# Patient Record
Sex: MALE | Race: WHITE | NOT HISPANIC OR LATINO | Employment: OTHER | URBAN - METROPOLITAN AREA
[De-identification: names, ages, dates, MRNs, and addresses within clinical notes are randomized per-mention and may not be internally consistent; named-entity substitution may affect disease eponyms.]

---

## 2017-02-20 ENCOUNTER — TELEPHONE (OUTPATIENT)
Dept: FAMILY MEDICINE | Facility: CLINIC | Age: 72
End: 2017-02-20

## 2017-02-20 DIAGNOSIS — E78.00 PURE HYPERCHOLESTEROLEMIA: ICD-10-CM

## 2017-02-20 RX ORDER — ROSUVASTATIN CALCIUM 20 MG/1
20 TABLET, FILM COATED ORAL DAILY
Qty: 90 TABLET | Refills: 3 | Status: SHIPPED | OUTPATIENT
Start: 2017-02-20 | End: 2017-02-23 | Stop reason: SDUPTHER

## 2017-02-20 NOTE — TELEPHONE ENCOUNTER
----- Message from Yulia Lagunas sent at 2/20/2017 10:40 AM CST -----  REFILLS:   Patient is requesting a medication refill.   RX name:rosuvastatin  Strength: 20 mg.  Directions:   [facility]  Pharmacy phone #:

## 2017-02-22 ENCOUNTER — TELEPHONE (OUTPATIENT)
Dept: FAMILY MEDICINE | Facility: CLINIC | Age: 72
End: 2017-02-22

## 2017-02-22 DIAGNOSIS — E78.00 PURE HYPERCHOLESTEROLEMIA: ICD-10-CM

## 2017-02-23 RX ORDER — ROSUVASTATIN CALCIUM 20 MG/1
20 TABLET, COATED ORAL DAILY
Qty: 90 TABLET | Refills: 3 | Status: SHIPPED | OUTPATIENT
Start: 2017-02-23 | End: 2018-03-14 | Stop reason: SDUPTHER

## 2017-03-08 ENCOUNTER — OFFICE VISIT (OUTPATIENT)
Dept: FAMILY MEDICINE | Facility: CLINIC | Age: 72
End: 2017-03-08
Payer: MEDICARE

## 2017-03-08 VITALS
WEIGHT: 222.88 LBS | RESPIRATION RATE: 16 BRPM | BODY MASS INDEX: 29.54 KG/M2 | OXYGEN SATURATION: 96 % | DIASTOLIC BLOOD PRESSURE: 90 MMHG | HEART RATE: 72 BPM | SYSTOLIC BLOOD PRESSURE: 152 MMHG | HEIGHT: 73 IN

## 2017-03-08 DIAGNOSIS — E78.00 PURE HYPERCHOLESTEROLEMIA: ICD-10-CM

## 2017-03-08 DIAGNOSIS — Z23 IMMUNIZATION DUE: ICD-10-CM

## 2017-03-08 DIAGNOSIS — M19.90 ARTHRITIS: ICD-10-CM

## 2017-03-08 DIAGNOSIS — N52.9 ERECTILE DYSFUNCTION, UNSPECIFIED ERECTILE DYSFUNCTION TYPE: ICD-10-CM

## 2017-03-08 DIAGNOSIS — Z12.11 COLON CANCER SCREENING: ICD-10-CM

## 2017-03-08 DIAGNOSIS — Z00.00 ENCOUNTER FOR PREVENTIVE HEALTH EXAMINATION: Primary | ICD-10-CM

## 2017-03-08 DIAGNOSIS — H91.92 HEARING LOSS OF LEFT EAR, UNSPECIFIED HEARING LOSS TYPE: ICD-10-CM

## 2017-03-08 PROCEDURE — 99499 UNLISTED E&M SERVICE: CPT | Mod: S$GLB,,, | Performed by: NURSE PRACTITIONER

## 2017-03-08 PROCEDURE — G0439 PPPS, SUBSEQ VISIT: HCPCS | Mod: S$GLB,,, | Performed by: NURSE PRACTITIONER

## 2017-03-08 PROCEDURE — 99999 PR PBB SHADOW E&M-EST. PATIENT-LVL IV: CPT | Mod: PBBFAC,,, | Performed by: NURSE PRACTITIONER

## 2017-03-08 RX ORDER — ROSUVASTATIN CALCIUM 20 MG/1
TABLET, FILM COATED ORAL
Refills: 3 | COMMUNITY
Start: 2017-02-23 | End: 2017-03-08 | Stop reason: SDUPTHER

## 2017-03-08 RX ORDER — UBIDECARENONE 30 MG
30 CAPSULE ORAL DAILY
COMMUNITY
Start: 2017-02-21 | End: 2022-11-15

## 2017-03-08 RX ORDER — ROSUVASTATIN CALCIUM 20 MG/1
TABLET, FILM COATED ORAL
COMMUNITY
Start: 2017-02-23 | End: 2017-03-08 | Stop reason: SDUPTHER

## 2017-03-08 NOTE — PATIENT INSTRUCTIONS
Counseling and Referral of Other Preventative  (Italic type indicates deductible and co-insurance are waived)    Patient Name: Joi Castellanos  Today's Date: 3/8/2017      SERVICE LIMITATIONS RECOMMENDATION    Vaccines    · Pneumococcal (once after 65)    · Influenza (annually)    · Hepatitis B (if medium/high risk)    · Prevnar 13      Hepatitis B medium/high risk factors:       - End-stage renal disease       - Hemophiliacs who received Factor VII or         IX concentrates       - Clients of institutions for the mentally             retarded       - Persons who live in the same house as          a HepB carrier       - Homosexual men       - Illicit injectable drug abusers     Pneumococcal: N/A Due 9/20/2017     Influenza: Recommended to patient, declined     Hepatitis B: N/A Not indicated     Prevnar 13: Done, repeat at next scheduled date    Prostate cancer screening (annually to age 75)     Prostate specific antigen (PSA) Shared decision making with Provider. Sometimes a co-pay may be required if the patient decides to have this test. The USPSTF no longer recommends prostate cancer screening routinely in medicine: every 1 year    Colorectal cancer screening (to age 75)    · Fecal occult blood test (annual)  · Flexible sigmoidoscopy (5y)  · Screening colonoscopy (10y)  · Barium enema   Recommended to patient, declined    Diabetes self-management training (no USPSTF recommendations)  Requires referral by treating physician for patient with diabetes or renal disease. 10 hours of initial DSMT sessions of no less than 30 minutes each in a continuous 12-month period. 2 hours of follow-up DSMT in subsequent years.  N/A Not indicated    Glaucoma screening (no USPSTF recommendation)  Diabetes mellitus, family history   , age 50 or over    American, age 65 or over  N/A Not indicated    Medical nutrition therapy for diabetes or renal disease (no recommended schedule)  Requires referral by treating  physician for patient with diabetes or renal disease or kidney transplant within the past 3 years.  Can be provided in same year as diabetes self-management training (DSMT), and CMS recommends medical nutrition therapy take place after DSMT. Up to 3 hours for initial year and 2 hours in subsequent years.  N/A Not indicated    Cardiovascular screening blood tests (every 5 years)  · Fasting lipid panel  Order as a panel if possible  Last done 9/23/2016, recommend to repeat every 1  years    Diabetes screening tests (at least every 3 years, Medicare covers annually or at 6-month intervals for prediabetic patients)  · Fasting blood sugar (FBS) or glucose tolerance test (GTT)  Patient must be diagnosed with one of the following:       - Hypertension       - Dyslipidemia       - Obesity (BMI 30kg/m2)       - Previous elevated impaired FBS or GTT       ... or any two of the following:       - Overweight (BMI 25 but <30)       - Family history of diabetes       - Age 65 or older       - History of gestational diabetes or birth of baby weighing more than 9 pounds  N/A Not indicated    Abdominal aortic aneurysm screening (once)  · Sonogram   Limited to patients who meet one of the following criteria:       - Men who are 65-75 years old and have smoked more than 100 cigarette in their lifetime       - Anyone with a family history of abdominal aortic aneurysm       - Anyone recommended for screening by the USPSTF  N/A Not indicated    HIV screening (annually for increased risk patients)  · HIV-1 and HIV-2 by EIA, or MERLE, rapid antibody test or oral mucosa transudate  Patients must be at increased risk for HIV infection per USPSTF guidelines or pregnant. Tests covered annually for patient at increased risk or as requested by the patient. Pregnant patients may receive up to 3 tests during pregnancy.  Risks discussed, screening is not recommended    Smoking cessation counseling (up to 8 sessions per year)  Patients must be  asymptomatic of tobacco-related conditions to receive as a preventative service.  Not a candidate    Subsequent annual wellness visit  At least 12 months since last AWV  Return in one year     The following information is provided to all patients.  This information is to help you find resources for any of the problems found today that may be affecting your health:                Living healthy guide: www.[facility].[place].[facility]      Understanding Diabetes: www.diabetes.org      Eating healthy: www.cdc.gov/healthyweight      CDC home safety checklist: www.cdc.gov/steadi/patient.html      Agency on Aging: www.goea.[place].[facility]      Alcoholics anonymous (AA): www.aa.org      Physical Activity: www.mauro.nih.gov/jn5koai      Tobacco use: www.quitwithusla.org

## 2017-03-08 NOTE — PROGRESS NOTES
"Joi Castellanos presented for a  Medicare AWV and comprehensive Health Risk Assessment today. The following components were reviewed and updated:    · Medical history  · Family History  · Social history  · Allergies and Current Medications  · Health Risk Assessment  · Health Maintenance  · Care Team     ** See Completed Assessments for Annual Wellness Visit within the encounter summary.**       The following assessments were completed:  · Living Situation  · CAGE  · Depression Screening  · Timed Get Up and Go  · Whisper Test  · Cognitive Function Screening  · Nutrition Screening  · ADL Screening  · PAQ Screening    Vitals:    03/08/17 0954 03/08/17 1048   BP: (!) 142/84 (!) 152/90   BP Location: Right arm Right arm   Patient Position: Sitting Sitting   BP Method: Manual Manual   Pulse: 72    Resp: 16    SpO2: 96%    Weight: 101.1 kg (222 lb 14.2 oz)    Height: 6' 1" (1.854 m)      Body mass index is 29.41 kg/(m^2).  Physical Exam   Constitutional: He is oriented to person, place, and time. He appears well-developed and well-nourished. No distress.   HENT:   Head: Normocephalic and atraumatic.   Eyes: EOM are normal. Pupils are equal, round, and reactive to light.   Neck: Normal range of motion.   Cardiovascular: Normal rate, regular rhythm, normal heart sounds and intact distal pulses.    Pulmonary/Chest: Effort normal and breath sounds normal. No respiratory distress.   Musculoskeletal: Normal range of motion. He exhibits no edema.   Neurological: He is alert and oriented to person, place, and time. Coordination normal.   Skin: Skin is warm and dry.   Psychiatric: He has a normal mood and affect. His behavior is normal. Judgment and thought content normal.   Vitals reviewed.        Diagnoses and health risks identified today and associated recommendations/orders:    1. Encounter for preventive health examination  Whisper test result: abnormal (bilateral ears)  - patient unable to hear whispered word as I stood 3 " feet behind patient and1 feet behind. He was able to identify whispered words as I stood directly next to him.    2. Pure hypercholesterolemia  Chronic; stable. Continue current treatment plan as previously prescribed by PCP.     3. Erectile dysfunction, unspecified erectile dysfunction type  Chronic; stable. Patient followed by PCP.    4. Arthritis  Chronic; stable. Patient followed by PCP.    5. Hearing loss of left ear, unspecified hearing loss type  Chronic; stable. Patient followed by PCP.     6. Colon cancer screening  Patient declined at this time. He reports that he will get his colonoscopy in the near future. Patient wants to consult with is wife first regarding a gastroenterologist.     7. Immunization due  - Influenza vaccine   Patient declined immunization at this time. Patient aware of benefits of being immunized and risks of not getting immunization.         Provided Joi with a 5-10 year written screening schedule and personal prevention plan. Recommendations were developed using the USPSTF age appropriate recommendations. Education, counseling, and referrals were provided as needed. After Visit Summary printed and given to patient which includes a list of additional screenings\tests needed.    Return in about 2 weeks (around 3/22/2017) for Follow-up with PCP, HRA visit in 1 year.    Aga Durahm NP

## 2017-03-08 NOTE — MR AVS SNAPSHOT
[facility]  [address] [place] [address]  Phone: [phone]  Fax: [phone]                  Joi Castellanos   3/8/2017 10:00 AM   Office Visit    Description:  Male : 1945   Provider:  Aga Durham NP   Department:  [facility]           Reason for Visit     Health Risk Assessment           Diagnoses this Visit        Comments    Encounter for preventive health examination    -  Primary     Pure hypercholesterolemia         Erectile dysfunction, unspecified erectile dysfunction type         Arthritis         Hearing loss of left ear, unspecified hearing loss type         Colon cancer screening         Immunization due                To Do List           Goals (5 Years of Data)     None      Follow-Up and Disposition     Return in about 2 weeks (around 3/22/2017) for Follow-up with PCP, HRA visit in 1 year.      [facility]     [facility] Care [facility] -  Assistance  Registered nurses in the [facility] provide clinical advisement, health education, appointment booking, and other advisory services.  Call for this free service at [phone].             Medications           Message regarding Medications     Verify the changes and/or additions to your medication regime listed below are the same as discussed with your clinician today.  If any of these changes or additions are incorrect, please notify your healthcare provider.             Verify that the below list of medications is an accurate representation of the medications you are currently taking.  If none reported, the list may be blank. If incorrect, please contact your healthcare provider. Carry this list with you in case of emergency.           Current Medications     co-enzyme Q-10 30 mg capsule     MULTIVIT &MINERALS/FERROUS FUM (MULTI VITAMIN ORAL) Take by mouth once daily.    rosuvastatin (CRESTOR) 20 MG tablet Take 1 tablet (20 mg total) by mouth once  "daily.           Clinical Reference Information           Your Vitals Were     BP Pulse Resp Height Weight SpO2    152/90 (BP Location: Right arm, Patient Position: Sitting, BP Method: Manual) 72 16 6' 1" (1.854 m) 101.1 kg (222 lb 14.2 oz) 96%    BMI                29.41 kg/m2          Blood Pressure          Most Recent Value    BP  (!)  152/90      Allergies as of 3/8/2017     No Known Allergies      Immunizations Administered on Date of Encounter - 3/8/2017     None      Instructions      Counseling and Referral of Other Preventative  (Italic type indicates deductible and co-insurance are waived)    Patient Name: Joi Castellanos  Today's Date: 3/8/2017      SERVICE LIMITATIONS RECOMMENDATION    Vaccines    · Pneumococcal (once after 65)    · Influenza (annually)    · Hepatitis B (if medium/high risk)    · Prevnar 13      Hepatitis B medium/high risk factors:       - End-stage renal disease       - Hemophiliacs who received Factor VII or         IX concentrates       - Clients of institutions for the mentally             retarded       - Persons who live in the same house as          a HepB carrier       - Homosexual men       - Illicit injectable drug abusers     Pneumococcal: N/A Due 9/20/2017     Influenza: Recommended to patient, declined     Hepatitis B: N/A Not indicated     Prevnar 13: Done, repeat at next scheduled date    Prostate cancer screening (annually to age 75)     Prostate specific antigen (PSA) Shared decision making with Provider. Sometimes a co-pay may be required if the patient decides to have this test. The USPSTF no longer recommends prostate cancer screening routinely in medicine: every 1 year    Colorectal cancer screening (to age 75)    · Fecal occult blood test (annual)  · Flexible sigmoidoscopy (5y)  · Screening colonoscopy (10y)  · Barium enema   Recommended to patient, declined    Diabetes self-management training (no USPSTF recommendations)  Requires referral by treating physician " for patient with diabetes or renal disease. 10 hours of initial DSMT sessions of no less than 30 minutes each in a continuous 12-month period. 2 hours of follow-up DSMT in subsequent years.  N/A Not indicated    Glaucoma screening (no USPSTF recommendation)  Diabetes mellitus, family history   , age 50 or over    American, age 65 or over  N/A Not indicated    Medical nutrition therapy for diabetes or renal disease (no recommended schedule)  Requires referral by treating physician for patient with diabetes or renal disease or kidney transplant within the past 3 years.  Can be provided in same year as diabetes self-management training (DSMT), and CMS recommends medical nutrition therapy take place after DSMT. Up to 3 hours for initial year and 2 hours in subsequent years.  N/A Not indicated    Cardiovascular screening blood tests (every 5 years)  · Fasting lipid panel  Order as a panel if possible  Last done 9/23/2016, recommend to repeat every 1  years    Diabetes screening tests (at least every 3 years, Medicare covers annually or at 6-month intervals for prediabetic patients)  · Fasting blood sugar (FBS) or glucose tolerance test (GTT)  Patient must be diagnosed with one of the following:       - Hypertension       - Dyslipidemia       - Obesity (BMI 30kg/m2)       - Previous elevated impaired FBS or GTT       ... or any two of the following:       - Overweight (BMI 25 but <30)       - Family history of diabetes       - Age 65 or older       - History of gestational diabetes or birth of baby weighing more than 9 pounds  N/A Not indicated    Abdominal aortic aneurysm screening (once)  · Sonogram   Limited to patients who meet one of the following criteria:       - Men who are 65-75 years old and have smoked more than 100 cigarette in their lifetime       - Anyone with a family history of abdominal aortic aneurysm       - Anyone recommended for screening by the USPSTF  N/A Not indicated    HIV  screening (annually for increased risk patients)  · HIV-1 and HIV-2 by EIA, or MERLE, rapid antibody test or oral mucosa transudate  Patients must be at increased risk for HIV infection per USPSTF guidelines or pregnant. Tests covered annually for patient at increased risk or as requested by the patient. Pregnant patients may receive up to 3 tests during pregnancy.  Risks discussed, screening is not recommended    Smoking cessation counseling (up to 8 sessions per year)  Patients must be asymptomatic of tobacco-related conditions to receive as a preventative service.  Not a candidate    Subsequent annual wellness visit  At least 12 months since last AWV  Return in one year     The following information is provided to all patients.  This information is to help you find resources for any of the problems found today that may be affecting your health:                Living healthy guide: www.[facility].[place].[facility]      Understanding Diabetes: www.diabetes.org      Eating healthy: www.cdc.gov/healthyweight      [facility] home safety checklist: www.cdc.gov/steadi/patient.html      Agency on Aging: www.goea.[place].[facility]      Alcoholics anonymous (AA): www.aa.org      Physical Activity: www.mauro.nih.gov/lh6ewxv      Tobacco use: www.quitwithusla.org          Language Assistance Services     ATTENTION: Language assistance services are available, free of charge. Please call [phone].      ATENCIÓN: Si habla faby, tiene a walker disposición servicios gratuitos de asistencia lingüística. Llame al [phone].     CHÚ Ý: N?u b?n nói Ti?ng Vi?t, có các d?ch v? h? tr? ngôn ng? mi?n phí dành cho b?n. G?i s? [phone].         [facility] complies with applicable Federal civil rights laws and does not discriminate on the basis of race, color, national origin, age, disability, or sex.

## 2017-03-22 ENCOUNTER — OFFICE VISIT (OUTPATIENT)
Dept: FAMILY MEDICINE | Facility: CLINIC | Age: 72
End: 2017-03-22
Payer: MEDICARE

## 2017-03-22 VITALS
WEIGHT: 223.31 LBS | OXYGEN SATURATION: 97 % | RESPIRATION RATE: 16 BRPM | DIASTOLIC BLOOD PRESSURE: 90 MMHG | HEART RATE: 77 BPM | BODY MASS INDEX: 29.46 KG/M2 | SYSTOLIC BLOOD PRESSURE: 150 MMHG

## 2017-03-22 DIAGNOSIS — F41.8 SITUATIONAL ANXIETY: ICD-10-CM

## 2017-03-22 DIAGNOSIS — R03.0 WHITE COAT SYNDROME WITHOUT DIAGNOSIS OF HYPERTENSION: ICD-10-CM

## 2017-03-22 DIAGNOSIS — R03.0 ELEVATED BP WITHOUT DIAGNOSIS OF HYPERTENSION: Primary | ICD-10-CM

## 2017-03-22 PROCEDURE — 1159F MED LIST DOCD IN RCRD: CPT | Mod: S$GLB,,,

## 2017-03-22 PROCEDURE — 99999 PR PBB SHADOW E&M-EST. PATIENT-LVL III: CPT | Mod: PBBFAC,,,

## 2017-03-22 PROCEDURE — 1160F RVW MEDS BY RX/DR IN RCRD: CPT | Mod: S$GLB,,,

## 2017-03-22 PROCEDURE — 99214 OFFICE O/P EST MOD 30 MIN: CPT | Mod: S$GLB,,,

## 2017-03-22 PROCEDURE — 1126F AMNT PAIN NOTED NONE PRSNT: CPT | Mod: S$GLB,,,

## 2017-03-22 PROCEDURE — 1157F ADVNC CARE PLAN IN RCRD: CPT | Mod: S$GLB,,,

## 2017-03-22 NOTE — MR AVS SNAPSHOT
[facility]  [address] [place] [address]  Phone: [phone]  Fax: [phone]                  Joi Castellanos   3/22/2017 9:40 AM   Office Visit    Description:  Male : 1945   Provider:  Mir Celestin Jr., MD   Department:  [facility]           Reason for Visit     Hypertension           Diagnoses this Visit        Comments    Elevated BP without diagnosis of hypertension    -  Primary     Situational anxiety         White coat syndrome without diagnosis of hypertension                To Do List           Goals (5 Years of Data)     None      Follow-Up and Disposition     Return if symptoms worsen or fail to improve.    Follow-up and Disposition History      [facility]     [facility] On Call Nurse Care Line -  Assistance  Registered nurses in the [facility] Call Center provide clinical advisement, health education, appointment booking, and other advisory services.  Call for this free service at [phone].             Medications           Message regarding Medications     Verify the changes and/or additions to your medication regime listed below are the same as discussed with your clinician today.  If any of these changes or additions are incorrect, please notify your healthcare provider.             Verify that the below list of medications is an accurate representation of the medications you are currently taking.  If none reported, the list may be blank. If incorrect, please contact your healthcare provider. Carry this list with you in case of emergency.           Current Medications     co-enzyme Q-10 30 mg capsule     MULTIVIT &MINERALS/FERROUS FUM (MULTI VITAMIN ORAL) Take by mouth once daily.    rosuvastatin (CRESTOR) 20 MG tablet Take 1 tablet (20 mg total) by mouth once daily.           Clinical Reference Information           Your Vitals Were     BP Pulse Resp Weight SpO2 BMI    150/90 (BP Location: Right arm, Patient Position:  Sitting, BP Method: Manual) 77 16 101.3 kg (223 lb 5.2 oz) 97% 29.46 kg/m2      Blood Pressure          Most Recent Value    BP  (!)  150/90      Allergies as of 3/22/2017     No Known Allergies      Immunizations Administered on Date of Encounter - 3/22/2017     None      Language Assistance Services     ATTENTION: Language assistance services are available, free of charge. Please call [phone].      ATENCIÓN: Si habla español, tiene a walker disposición servicios gratuitos de asistencia lingüística. Llame al [phone].     CHÚ Ý: N?u b?n nói Ti?ng Vi?t, có các d?ch v? h? tr? ngôn ng? mi?n phí dành cho b?n. G?i s? [phone].         [facility] complies with applicable Federal civil rights laws and does not discriminate on the basis of race, color, national origin, age, disability, or sex.

## 2017-03-22 NOTE — PROGRESS NOTES
Subjective:       Patient ID: Joi Castellanos is a 71 y.o. male.    Chief Complaint: Hypertension    HPI The patient presents with concerns about his blood pressure. It was found to be elevated on his HRA visit. His blood pressures are always normal at home. He has anxiety about not being late for his appointments. He monitors his blood pressures at home and his blood pressures are in the 120-70.     Review of Systems   Constitutional: Negative.    Respiratory: Negative.  Negative for shortness of breath.    Cardiovascular: Negative for chest pain.   Psychiatric/Behavioral: Negative.    All other systems reviewed and are negative.      Objective:      Vitals:    03/22/17 0950   BP: (!) 150/90   Pulse: 77   Resp: 16     Physical Exam   Constitutional: He is oriented to person, place, and time. He appears well-developed and well-nourished. He is cooperative. No distress.   HENT:   Head: Normocephalic and atraumatic.   Right Ear: Hearing, tympanic membrane, external ear and ear canal normal.   Left Ear: Hearing, external ear and ear canal normal.   Nose: Nose normal.   Mouth/Throat: Oropharynx is clear and moist.   Eyes: Conjunctivae are normal. Pupils are equal, round, and reactive to light.   Neck: Normal range of motion. Neck supple. No thyromegaly present.   Cardiovascular: Normal rate, regular rhythm, normal heart sounds and intact distal pulses.    Pulmonary/Chest: Effort normal and breath sounds normal. No respiratory distress.   Musculoskeletal: Normal range of motion. He exhibits no edema or tenderness.   Lymphadenopathy:     He has no cervical adenopathy.   Neurological: He is alert and oriented to person, place, and time. He has normal strength. Coordination and gait normal.   Skin: Skin is warm, dry and intact. No cyanosis. Nails show no clubbing.   Psychiatric: He has a normal mood and affect. His speech is normal and behavior is normal. Judgment and thought content normal. Cognition and memory are  normal.   Vitals reviewed.      Assessment:       1. Elevated BP without diagnosis of hypertension    2. Situational anxiety    3. White coat syndrome without diagnosis of hypertension        Plan:       Elevated BP without diagnosis of hypertension    Situational anxiety    White coat syndrome without diagnosis of hypertension      Return if symptoms worsen or fail to improve.      Continued monitoring. Dash Diet. Continue to exercise     Not a candidate for medications

## 2017-08-02 ENCOUNTER — PATIENT MESSAGE (OUTPATIENT)
Dept: FAMILY MEDICINE | Facility: CLINIC | Age: 72
End: 2017-08-02

## 2017-08-02 DIAGNOSIS — M16.12 PRIMARY OSTEOARTHRITIS OF LEFT HIP: Primary | ICD-10-CM

## 2017-08-21 ENCOUNTER — TELEPHONE (OUTPATIENT)
Dept: FAMILY MEDICINE | Facility: CLINIC | Age: 72
End: 2017-08-21

## 2017-08-21 DIAGNOSIS — M16.10 ARTHRITIS, HIP: Primary | ICD-10-CM

## 2018-02-12 ENCOUNTER — PES CALL (OUTPATIENT)
Dept: ADMINISTRATIVE | Facility: CLINIC | Age: 73
End: 2018-02-12

## 2018-03-12 ENCOUNTER — OFFICE VISIT (OUTPATIENT)
Dept: FAMILY MEDICINE | Facility: CLINIC | Age: 73
End: 2018-03-12
Payer: MEDICARE

## 2018-03-12 ENCOUNTER — TELEPHONE (OUTPATIENT)
Dept: FAMILY MEDICINE | Facility: CLINIC | Age: 73
End: 2018-03-12

## 2018-03-12 VITALS
HEIGHT: 74 IN | DIASTOLIC BLOOD PRESSURE: 80 MMHG | BODY MASS INDEX: 28.43 KG/M2 | HEART RATE: 72 BPM | WEIGHT: 221.56 LBS | SYSTOLIC BLOOD PRESSURE: 130 MMHG | OXYGEN SATURATION: 98 %

## 2018-03-12 DIAGNOSIS — Z29.9 PREVENTIVE MEASURE: ICD-10-CM

## 2018-03-12 DIAGNOSIS — R73.9 HYPERGLYCEMIA: Primary | ICD-10-CM

## 2018-03-12 DIAGNOSIS — Z00.00 ENCOUNTER FOR PREVENTIVE HEALTH EXAMINATION: Primary | ICD-10-CM

## 2018-03-12 DIAGNOSIS — N52.01 ERECTILE DYSFUNCTION DUE TO ARTERIAL INSUFFICIENCY: ICD-10-CM

## 2018-03-12 DIAGNOSIS — E78.00 PURE HYPERCHOLESTEROLEMIA: ICD-10-CM

## 2018-03-12 DIAGNOSIS — M19.90 ARTHRITIS: ICD-10-CM

## 2018-03-12 DIAGNOSIS — R03.0 ELEVATED BP WITHOUT DIAGNOSIS OF HYPERTENSION: ICD-10-CM

## 2018-03-12 PROCEDURE — 99499 UNLISTED E&M SERVICE: CPT | Mod: S$GLB,,,

## 2018-03-12 PROCEDURE — 90732 PPSV23 VACC 2 YRS+ SUBQ/IM: CPT | Mod: S$GLB,,,

## 2018-03-12 PROCEDURE — G0009 ADMIN PNEUMOCOCCAL VACCINE: HCPCS | Mod: S$GLB,,,

## 2018-03-12 PROCEDURE — 99397 PER PM REEVAL EST PAT 65+ YR: CPT | Mod: S$GLB,,,

## 2018-03-12 PROCEDURE — 99999 PR PBB SHADOW E&M-EST. PATIENT-LVL III: CPT | Mod: PBBFAC,,,

## 2018-03-12 RX ORDER — SILDENAFIL 100 MG/1
100 TABLET, FILM COATED ORAL DAILY PRN
Qty: 6 TABLET | Refills: 11 | Status: SHIPPED | OUTPATIENT
Start: 2018-03-12 | End: 2019-06-11

## 2018-03-12 RX ORDER — MELOXICAM 15 MG/1
TABLET ORAL
COMMUNITY
Start: 2017-11-24 | End: 2020-09-25

## 2018-03-12 NOTE — PROGRESS NOTES
Subjective:       Patient ID: Joi Castellanos is a 72 y.o. male.    Chief Complaint: Annual Exam    HPI Data obtained from Agency for Healthcare and Research Quality (AHRQ):    GRADE A -The USPSTF recommends the service. There is high certainty that the net benefit is substantial. Offer or provide this service.    GRADE B - The USPSTF recommends the service. There is high certainty that the net benefit is moderate or there is moderate certainty that the net benefit is moderate to substantial. Offer or provide this service.    View All     13 - Recommended (A, B)    Grade Title Risk Info. Details   Due  Colorectal Cancer: Screening --Adults aged 50 to 75 years     140/82 High Blood Pressure: Screening and Home Monitoring -- Adults     Low  Syphilis: Screening --Asymptomatic, nonpregnant adults and adolescents who are at increased risk for syphilis infection     Denies  Alcohol Misuse: Screening and Behavioral Counseling Interventions in Primary Care -- Adults     Denies  Depression: Screening -- General adult population, including pregnant and postpartum women     Low  Fall Prevention --Exercise/Physical Therapy ANDVitamin D Supplementation: Community-dwelling Adults 65 Years or Older, Increased Risk for Falls     Yes  Healthful Diet and Physical Activity for CVD Disease Prevention: Counseling -- Adults with CVD Risk Factors     Low  Hepatitis B: Screening -- Nonpregnant Adolescents and Adults At High Risk     UTD  Hepatitis C Virus Infection: Screening--Adults at High Risk and Adults born between 1945 and 1965     Low  Latent Tuberculosis Infection: Screening -- Asymptomatic adults at increased risk for infection     Over  Obesity: Screening for and Management of-- All Adults       Low  Sexually Transmitted Infections: Behavioral Counseling -- Sexually Active Adolescents and Adults     Compliant Statin Use for the Primary Prevention of CVD: Preventive Medicine -- Adults age 40 to 75 years with no history of CVD, 1  or more CVD risk factors, and a calculated 10-year CVD event risk of 10% or greater.       He monitors his blood pressures and it is in 130/80 average range. He has severe hip arthritis which is being controlled with meloxicam.     Review of Systems   Constitutional: Negative.    Respiratory: Negative.  Negative for shortness of breath.    Cardiovascular: Negative for chest pain.   Psychiatric/Behavioral: Negative.    All other systems reviewed and are negative.      Objective:      Vitals:    03/12/18 0950   BP: 130/80   Pulse: 72     Physical Exam   Constitutional: He is oriented to person, place, and time. He appears well-developed and well-nourished. He is cooperative. No distress.   HENT:   Head: Normocephalic and atraumatic.   Right Ear: Hearing, tympanic membrane, external ear and ear canal normal.   Left Ear: Hearing, external ear and ear canal normal.   Nose: Nose normal.   Mouth/Throat: Oropharynx is clear and moist.   Eyes: Conjunctivae are normal. Pupils are equal, round, and reactive to light.   Neck: Normal range of motion. Neck supple. No thyromegaly present.   Cardiovascular: Normal rate, regular rhythm, normal heart sounds and intact distal pulses.    Pulmonary/Chest: Effort normal and breath sounds normal. No respiratory distress.   Musculoskeletal: Normal range of motion. He exhibits no edema or tenderness.   Lymphadenopathy:     He has no cervical adenopathy.   Neurological: He is alert and oriented to person, place, and time. He has normal strength. Coordination and gait normal.   Skin: Skin is warm, dry and intact. No cyanosis. Nails show no clubbing.   Psychiatric: He has a normal mood and affect. His speech is normal and behavior is normal. Judgment and thought content normal. Cognition and memory are normal.   Vitals reviewed.      Assessment:       1. Encounter for preventive health examination    2. Elevated BP without diagnosis of hypertension    3. Pure hypercholesterolemia    4.  Erectile dysfunction due to arterial insufficiency    5. Arthritis    6. Preventive measure        Plan:       Encounter for preventive health examination    Elevated BP without diagnosis of hypertension    Pure hypercholesterolemia    Erectile dysfunction due to arterial insufficiency    Arthritis    Preventive measure    He is interested in a vacuum pump device.     Follow-up in about 1 year (around 3/12/2019).

## 2018-03-14 ENCOUNTER — TELEPHONE (OUTPATIENT)
Dept: FAMILY MEDICINE | Facility: CLINIC | Age: 73
End: 2018-03-14

## 2018-03-14 DIAGNOSIS — E78.00 PURE HYPERCHOLESTEROLEMIA: ICD-10-CM

## 2018-03-14 RX ORDER — ROSUVASTATIN CALCIUM 20 MG/1
20 TABLET, COATED ORAL DAILY
Qty: 90 TABLET | Refills: 3 | Status: SHIPPED | OUTPATIENT
Start: 2018-03-14 | End: 2019-03-08 | Stop reason: SDUPTHER

## 2018-03-14 NOTE — TELEPHONE ENCOUNTER
----- Message from Guera Kong sent at 3/14/2018 12:55 PM CDT -----  Contact: Self [phone]  Patient is calling to get refills on his medication sent to [facility]- [facility] - [address] [phone] (Phone)  [phone] (Fax)    1. rosuvastatin (CRESTOR) 20 MG tablet 90 tablet

## 2018-09-27 ENCOUNTER — PES CALL (OUTPATIENT)
Dept: ADMINISTRATIVE | Facility: CLINIC | Age: 73
End: 2018-09-27

## 2019-02-25 ENCOUNTER — OFFICE VISIT (OUTPATIENT)
Dept: FAMILY MEDICINE | Facility: CLINIC | Age: 74
End: 2019-02-25
Payer: MEDICARE

## 2019-02-25 VITALS
DIASTOLIC BLOOD PRESSURE: 86 MMHG | HEIGHT: 73 IN | WEIGHT: 215.81 LBS | OXYGEN SATURATION: 99 % | HEART RATE: 72 BPM | TEMPERATURE: 98 F | SYSTOLIC BLOOD PRESSURE: 144 MMHG | BODY MASS INDEX: 28.6 KG/M2

## 2019-02-25 DIAGNOSIS — Z00.00 ENCOUNTER FOR PREVENTIVE HEALTH EXAMINATION: Primary | ICD-10-CM

## 2019-02-25 DIAGNOSIS — R73.01 IFG (IMPAIRED FASTING GLUCOSE): ICD-10-CM

## 2019-02-25 DIAGNOSIS — E78.00 PURE HYPERCHOLESTEROLEMIA: ICD-10-CM

## 2019-02-25 DIAGNOSIS — M16.12 PRIMARY OSTEOARTHRITIS OF LEFT HIP: ICD-10-CM

## 2019-02-25 PROCEDURE — 99999 PR PBB SHADOW E&M-EST. PATIENT-LVL IV: CPT | Mod: PBBFAC,HCNC,, | Performed by: FAMILY MEDICINE

## 2019-02-25 PROCEDURE — 99397 PR PREVENTIVE VISIT,EST,65 & OVER: ICD-10-PCS | Mod: HCNC,S$GLB,, | Performed by: FAMILY MEDICINE

## 2019-02-25 PROCEDURE — 99397 PER PM REEVAL EST PAT 65+ YR: CPT | Mod: HCNC,S$GLB,, | Performed by: FAMILY MEDICINE

## 2019-02-25 PROCEDURE — 99499 UNLISTED E&M SERVICE: CPT | Mod: S$GLB,,, | Performed by: FAMILY MEDICINE

## 2019-02-25 PROCEDURE — 99499 RISK ADDL DX/OHS AUDIT: ICD-10-PCS | Mod: S$GLB,,, | Performed by: FAMILY MEDICINE

## 2019-02-25 PROCEDURE — 99999 PR PBB SHADOW E&M-EST. PATIENT-LVL IV: ICD-10-PCS | Mod: PBBFAC,HCNC,, | Performed by: FAMILY MEDICINE

## 2019-02-25 NOTE — PROGRESS NOTES
(Portions of this note were dictated using voice recognition software and may contain dictation related errors in spelling/grammar/syntax not found on text review)    CC:   Chief Complaint   Patient presents with    Establish Care     tetanus due    Colonoscopy       HPI: 73 y.o. male new to this office to establish care.  Prior patient of Dr. Celestin.    Hyperlipidemia on rosuvastatin 20 mg daily    ED, prescribed Viagra    DJD severe left hip, also knee DJD.  X-rays on file from 2016.  Given meloxicam that he takes roughly 3 times a week.  Plain follow-up with his orthopedist, Dr. Wei to potentially discuss hip replacement given some worsening pains of recent    Abnormal glucose as below, couple of readings in the 130s, unknown fasting status for all of these.  Did have an A1c of 6.0 last year consistent with pre diabetes    BP elevated, review of prior record demonstrated prior BP elevations as well.  No formal hypertension diagnosis and no meds for this.  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  At 1 time had micro hematuria noted on workup and had further testing including imaging and cystoscopy which was all normal.  Denies any current hematuria issues.      Exercises at the gym to try to help with his arthritis pains        Past Medical History:   Diagnosis Date    Arthritis     Hyperlipidemia     IFG (impaired fasting glucose)        Past Surgical History:   Procedure Laterality Date    TONSILLECTOMY         Family History   Problem Relation Age of Onset    Cancer Mother         unknown, met to spine    Hypertension Mother     Osteoarthritis Mother     Deep vein thrombosis Father     Heart attack Father     Pulmonary  embolism Father     Emphysema Father     Arthritis Sister     No Known Problems Daughter     No Known Problems Son     Fibromyalgia Sister     Breast cancer Maternal Aunt     Heart disease Neg Hx     Prostate cancer Neg Hx     Colon cancer Neg Hx        Social History     Socioeconomic History    Marital status:      Spouse name: Not on file    Number of children: Not on file    Years of education: Not on file    Highest education level: Not on file   Social Needs    Financial resource strain: Not on file    Food insecurity - worry: Not on file    Food insecurity - inability: Not on file    Transportation needs - medical: Not on file    Transportation needs - non-medical: Not on file   Occupational History    Not on file   Tobacco Use    Smoking status: Never Smoker    Smokeless tobacco: Never Used   Substance and Sexual Activity    Alcohol use: Yes     Comment: Occasionally    Drug use: No    Sexual activity: Yes     Partners: Female   Other Topics Concern    Not on file   Social History Narrative    Lives in [place] with his wife. He is retired from [employer]. He is originally from [place]. 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  2 daughters with marital and family problems. One of them is  and remarried because her  has a serious anger problems. His other son-in-law is in treatment for drug and alcohol problems. He has 5 grand children. He was in the [employer]. He goes to the gym. His son loves to hunt. He bought a small RV       Lab Results   Component Value Date    WBC 4.18 03/12/2018    HGB 15.3 03/12/2018    HCT 43.0 03/12/2018     03/12/2018    CHOL 166 03/12/2018    TRIG 168 (H) 03/12/2018    HDL 35 (L) 03/12/2018    ALT 38 03/12/2018    AST 28 03/12/2018     03/12/2018    K 4.2 03/12/2018     03/12/2018    CREATININE 0.96 03/12/2018    CALCIUM 9.6 03/12/2018    ALBUMIN 4.6 03/12/2018    BUN 14 03/12/2018    CO2 28 03/12/2018    TSH 1.690 09/23/2016     HGBA1C 6.0 (H) 03/12/2018    LDLCALC 97.4 03/12/2018     (H) 03/12/2018         Glucose   Date Value Ref Range Status   03/12/2018 132 (H) 70 - 110 mg/dL Final   09/23/2016 137 (H) 70 - 110 mg/dL Final   08/19/2015 104 70 - 110 mg/dL Final   11/20/2013 119 (H) 70 - 110 mg/dL Final             Vital signs reviewed  PE:   APPEARANCE: Well nourished, well developed, in no acute distress.    HEAD: Normocephalic, atraumatic.  EYES: PERRL. EOMI.   Conjunctivae noninjected.  EARS: TM's intact. Light reflex normal. No retraction or perforation.    NOSE: Mucosa pink. Airway clear.  MOUTH & THROAT: No tonsillar enlargement. No pharyngeal erythema or exudate.   NECK: Supple with no cervical lymphadenopathy.  No carotid bruits.  No thyromegaly  CHEST: Good inspiratory effort. Lungs clear to auscultation with no wheezes or crackles.  CARDIOVASCULAR: Normal S1, S2. No rubs, murmurs, or gallops.  ABDOMEN: Bowel sounds normal. Not distended. Soft. No tenderness or masses. No organomegaly.  EXTREMITIES: No edema, cyanosis, or clubbing.  Antalgic gait secondary to his left hip pain.      IMPRESSION  1. Encounter for preventive health examination    2. IFG (impaired fasting glucose)    3. Pure hypercholesterolemia    4. Primary osteoarthritis of left hip        PLAN  Orders Placed This Encounter   Procedures    CBC auto differential    Comprehensive metabolic panel    Lipid panel    TSH    Hemoglobin A1c     Elevated blood pressure, prior white coat hypertension consideration.  Advise one-month nurse follow up with home blood pressure monitor compared to office monitor along with Home readings to check for stability of blood pressure readings    Continue regular exercise.  Advise healthy diet    Meloxicam okay for p.r.n. use but he    will be following up with the worse orthopedist given his hip arthritis to discuss potential replacement    Hyperlipidemia:  Continue Crestor 20 mg daily    Pre diabetes:  Healthy diet, check  labs        SCREENINGS (males >=65)    Immunizations:  tdap:  Can get at pharmacy  Zoster:  Can get at pharmacy  PCV 2016  PVX 2018  Declines flu vaccine    Colon cancer screen: FIT kit neg 2019 (c/scope 2006-normal)    Hepatitis-C screening up-to-date 2016

## 2019-02-25 NOTE — PATIENT INSTRUCTIONS
Look into getting the Shingles vaccine (SHINGRIX) at your local pharmacy (2 part series, done once at/after age 50)      Check with your pharmacy regarding getting the tetanus (Tdap) vaccine (once every 10 years)

## 2019-02-26 ENCOUNTER — PATIENT MESSAGE (OUTPATIENT)
Dept: FAMILY MEDICINE | Facility: CLINIC | Age: 74
End: 2019-02-26

## 2019-02-26 DIAGNOSIS — E11.9 TYPE 2 DIABETES MELLITUS WITHOUT COMPLICATION, WITHOUT LONG-TERM CURRENT USE OF INSULIN: Primary | ICD-10-CM

## 2019-02-26 LAB
ALBUMIN SERPL-MCNC: 4.6 G/DL (ref 3.6–5.1)
ALBUMIN/GLOB SERPL: 1.2 (CALC) (ref 1–2.5)
ALP SERPL-CCNC: 54 U/L (ref 40–115)
ALT SERPL-CCNC: 22 U/L (ref 9–46)
AST SERPL-CCNC: 18 U/L (ref 10–35)
BASOPHILS # BLD AUTO: 19 CELLS/UL (ref 0–200)
BASOPHILS NFR BLD AUTO: 0.4 %
BILIRUB SERPL-MCNC: 0.8 MG/DL (ref 0.2–1.2)
BUN SERPL-MCNC: 17 MG/DL (ref 7–25)
BUN/CREAT SERPL: ABNORMAL (CALC) (ref 6–22)
CALCIUM SERPL-MCNC: 9.7 MG/DL (ref 8.6–10.3)
CHLORIDE SERPL-SCNC: 100 MMOL/L (ref 98–110)
CHOLEST SERPL-MCNC: 204 MG/DL
CHOLEST/HDLC SERPL: 5.8 (CALC)
CO2 SERPL-SCNC: 28 MMOL/L (ref 20–32)
CREAT SERPL-MCNC: 1 MG/DL (ref 0.7–1.18)
EOSINOPHIL # BLD AUTO: 113 CELLS/UL (ref 15–500)
EOSINOPHIL NFR BLD AUTO: 2.4 %
ERYTHROCYTE [DISTWIDTH] IN BLOOD BY AUTOMATED COUNT: 12.5 % (ref 11–15)
GFRSERPLBLD MDRD-ARVRAT: 75 ML/MIN/1.73M2
GLOBULIN SER CALC-MCNC: 4 G/DL (CALC) (ref 1.9–3.7)
GLUCOSE SERPL-MCNC: 133 MG/DL (ref 65–99)
HBA1C MFR BLD: 6.6 % OF TOTAL HGB
HCT VFR BLD AUTO: 47.5 % (ref 38.5–50)
HDLC SERPL-MCNC: 35 MG/DL
HGB BLD-MCNC: 16 G/DL (ref 13.2–17.1)
LDLC SERPL CALC-MCNC: 130 MG/DL (CALC)
LYMPHOCYTES # BLD AUTO: 1941 CELLS/UL (ref 850–3900)
LYMPHOCYTES NFR BLD AUTO: 41.3 %
MCH RBC QN AUTO: 30.7 PG (ref 27–33)
MCHC RBC AUTO-ENTMCNC: 33.7 G/DL (ref 32–36)
MCV RBC AUTO: 91 FL (ref 80–100)
MONOCYTES # BLD AUTO: 376 CELLS/UL (ref 200–950)
MONOCYTES NFR BLD AUTO: 8 %
NEUTROPHILS # BLD AUTO: 2251 CELLS/UL (ref 1500–7800)
NEUTROPHILS NFR BLD AUTO: 47.9 %
NONHDLC SERPL-MCNC: 169 MG/DL (CALC)
PLATELET # BLD AUTO: 200 THOUSAND/UL (ref 140–400)
PMV BLD REES-ECKER: 10.1 FL (ref 7.5–12.5)
POTASSIUM SERPL-SCNC: 4.2 MMOL/L (ref 3.5–5.3)
PROT SERPL-MCNC: 8.6 G/DL (ref 6.1–8.1)
RBC # BLD AUTO: 5.22 MILLION/UL (ref 4.2–5.8)
SODIUM SERPL-SCNC: 139 MMOL/L (ref 135–146)
TRIGL SERPL-MCNC: 244 MG/DL
TSH SERPL-ACNC: 1.55 MIU/L (ref 0.4–4.5)
WBC # BLD AUTO: 4.7 THOUSAND/UL (ref 3.8–10.8)

## 2019-02-26 NOTE — TELEPHONE ENCOUNTER
Dear Joi Castellanos    Your recent labs were reviewed and released to your account.  Your labs were reviewed.  The blood sugar test again was elevated and the follow-up test for this does show that you do have diabetes although is not severe at this point.  However, working hard on  regular exercise and healthy eating habits can help normalize the blood sugar.  I do think we need to continue to monitor this periodically, and I would like to recheck some labs in 3 months to see if there is any improvement.  If there is any trend up in your blood sugars, we may need to consider starting some medication for this.    Also your cholesterol test means to be a little bit lower especially given the diabetes diagnosis.  At this point I think you can continue your Crestor 20 mg daily but again in 3 months I would like to check some labs again and consider potentially upgrading your  Crestor up to 40 mg if we need to at that time to better manage the blood cholesterol levels    Please check back in the office in 3 months, and call a few days to a week ahead of time to get these follow-up labs checked as well.    Marc Lakhani MD

## 2019-02-26 NOTE — TELEPHONE ENCOUNTER
My chart message sent.  Needs repeat office visit in 3 months.  I have labs ordered through [facility] that he can just get done a few days before he comes in for his repeat visit.

## 2019-03-06 ENCOUNTER — CLINICAL SUPPORT (OUTPATIENT)
Dept: FAMILY MEDICINE | Facility: CLINIC | Age: 74
End: 2019-03-06
Payer: MEDICARE

## 2019-03-06 VITALS — SYSTOLIC BLOOD PRESSURE: 147 MMHG | DIASTOLIC BLOOD PRESSURE: 84 MMHG

## 2019-03-06 PROCEDURE — 99999 PR PBB SHADOW E&M-EST. PATIENT-LVL II: CPT | Mod: PBBFAC,HCNC,,

## 2019-03-06 PROCEDURE — 99999 PR PBB SHADOW E&M-EST. PATIENT-LVL II: ICD-10-PCS | Mod: PBBFAC,HCNC,,

## 2019-03-06 NOTE — PROGRESS NOTES
Pt came in for blood pressure check 3/6/2019.    Self machine read- 175/95  Office machine read-161/89    Second reading on in office machine- 147/84

## 2019-03-08 DIAGNOSIS — E78.00 PURE HYPERCHOLESTEROLEMIA: ICD-10-CM

## 2019-03-08 RX ORDER — ROSUVASTATIN CALCIUM 20 MG/1
20 TABLET, COATED ORAL DAILY
Qty: 90 TABLET | Refills: 3 | Status: SHIPPED | OUTPATIENT
Start: 2019-03-08 | End: 2020-03-23

## 2019-04-26 ENCOUNTER — PES CALL (OUTPATIENT)
Dept: ADMINISTRATIVE | Facility: CLINIC | Age: 74
End: 2019-04-26

## 2019-06-06 ENCOUNTER — PATIENT MESSAGE (OUTPATIENT)
Dept: FAMILY MEDICINE | Facility: CLINIC | Age: 74
End: 2019-06-06

## 2019-06-06 LAB
ALBUMIN SERPL-MCNC: 4.6 G/DL (ref 3.6–5.1)
ALBUMIN/CREAT UR: 9 MCG/MG CREAT
ALBUMIN/GLOB SERPL: 1.5 (CALC) (ref 1–2.5)
ALP SERPL-CCNC: 60 U/L (ref 40–115)
ALT SERPL-CCNC: 23 U/L (ref 9–46)
AST SERPL-CCNC: 20 U/L (ref 10–35)
BILIRUB SERPL-MCNC: 0.6 MG/DL (ref 0.2–1.2)
BUN SERPL-MCNC: 14 MG/DL (ref 7–25)
BUN/CREAT SERPL: ABNORMAL (CALC) (ref 6–22)
CALCIUM SERPL-MCNC: 9.6 MG/DL (ref 8.6–10.3)
CHLORIDE SERPL-SCNC: 102 MMOL/L (ref 98–110)
CHOLEST SERPL-MCNC: 147 MG/DL
CHOLEST/HDLC SERPL: 4.2 (CALC)
CO2 SERPL-SCNC: 30 MMOL/L (ref 20–32)
CREAT SERPL-MCNC: 1.1 MG/DL (ref 0.7–1.18)
CREAT UR-MCNC: 64 MG/DL (ref 20–320)
GFRSERPLBLD MDRD-ARVRAT: 67 ML/MIN/1.73M2
GLOBULIN SER CALC-MCNC: 3 G/DL (CALC) (ref 1.9–3.7)
GLUCOSE SERPL-MCNC: 137 MG/DL (ref 65–99)
HBA1C MFR BLD: 6.2 % OF TOTAL HGB
HDLC SERPL-MCNC: 35 MG/DL
LDLC SERPL CALC-MCNC: 84 MG/DL (CALC)
MICROALBUMIN UR-MCNC: 0.6 MG/DL
NONHDLC SERPL-MCNC: 112 MG/DL (CALC)
POTASSIUM SERPL-SCNC: 4.2 MMOL/L (ref 3.5–5.3)
PROT SERPL-MCNC: 7.6 G/DL (ref 6.1–8.1)
SODIUM SERPL-SCNC: 139 MMOL/L (ref 135–146)
TRIGL SERPL-MCNC: 190 MG/DL

## 2019-06-11 ENCOUNTER — OFFICE VISIT (OUTPATIENT)
Dept: FAMILY MEDICINE | Facility: CLINIC | Age: 74
End: 2019-06-11
Payer: MEDICARE

## 2019-06-11 VITALS
DIASTOLIC BLOOD PRESSURE: 85 MMHG | SYSTOLIC BLOOD PRESSURE: 133 MMHG | BODY MASS INDEX: 28.31 KG/M2 | WEIGHT: 213.63 LBS | HEIGHT: 73 IN | TEMPERATURE: 98 F | OXYGEN SATURATION: 98 % | HEART RATE: 72 BPM

## 2019-06-11 DIAGNOSIS — I10 WHITE COAT SYNDROME WITH HYPERTENSION: ICD-10-CM

## 2019-06-11 DIAGNOSIS — M16.10 ARTHRITIS, HIP: ICD-10-CM

## 2019-06-11 DIAGNOSIS — E11.9 TYPE 2 DIABETES MELLITUS WITHOUT COMPLICATION, WITHOUT LONG-TERM CURRENT USE OF INSULIN: ICD-10-CM

## 2019-06-11 DIAGNOSIS — E78.00 PURE HYPERCHOLESTEROLEMIA: Primary | ICD-10-CM

## 2019-06-11 PROCEDURE — 1101F PT FALLS ASSESS-DOCD LE1/YR: CPT | Mod: HCNC,CPTII,S$GLB, | Performed by: FAMILY MEDICINE

## 2019-06-11 PROCEDURE — 99999 PR PBB SHADOW E&M-EST. PATIENT-LVL IV: ICD-10-PCS | Mod: PBBFAC,HCNC,, | Performed by: FAMILY MEDICINE

## 2019-06-11 PROCEDURE — 99499 RISK ADDL DX/OHS AUDIT: ICD-10-PCS | Mod: S$GLB,,, | Performed by: FAMILY MEDICINE

## 2019-06-11 PROCEDURE — 99499 UNLISTED E&M SERVICE: CPT | Mod: S$GLB,,, | Performed by: FAMILY MEDICINE

## 2019-06-11 PROCEDURE — 3079F DIAST BP 80-89 MM HG: CPT | Mod: HCNC,CPTII,S$GLB, | Performed by: FAMILY MEDICINE

## 2019-06-11 PROCEDURE — 1101F PR PT FALLS ASSESS DOC 0-1 FALLS W/OUT INJ PAST YR: ICD-10-PCS | Mod: HCNC,CPTII,S$GLB, | Performed by: FAMILY MEDICINE

## 2019-06-11 PROCEDURE — 99214 PR OFFICE/OUTPT VISIT, EST, LEVL IV, 30-39 MIN: ICD-10-PCS | Mod: HCNC,S$GLB,, | Performed by: FAMILY MEDICINE

## 2019-06-11 PROCEDURE — 3044F PR MOST RECENT HEMOGLOBIN A1C LEVEL <7.0%: ICD-10-PCS | Mod: HCNC,CPTII,S$GLB, | Performed by: FAMILY MEDICINE

## 2019-06-11 PROCEDURE — 3044F HG A1C LEVEL LT 7.0%: CPT | Mod: HCNC,CPTII,S$GLB, | Performed by: FAMILY MEDICINE

## 2019-06-11 PROCEDURE — 3075F SYST BP GE 130 - 139MM HG: CPT | Mod: HCNC,CPTII,S$GLB, | Performed by: FAMILY MEDICINE

## 2019-06-11 PROCEDURE — 99999 PR PBB SHADOW E&M-EST. PATIENT-LVL IV: CPT | Mod: PBBFAC,HCNC,, | Performed by: FAMILY MEDICINE

## 2019-06-11 PROCEDURE — 99214 OFFICE O/P EST MOD 30 MIN: CPT | Mod: HCNC,S$GLB,, | Performed by: FAMILY MEDICINE

## 2019-06-11 PROCEDURE — 3075F PR MOST RECENT SYSTOLIC BLOOD PRESS GE 130-139MM HG: ICD-10-PCS | Mod: HCNC,CPTII,S$GLB, | Performed by: FAMILY MEDICINE

## 2019-06-11 PROCEDURE — 3079F PR MOST RECENT DIASTOLIC BLOOD PRESSURE 80-89 MM HG: ICD-10-PCS | Mod: HCNC,CPTII,S$GLB, | Performed by: FAMILY MEDICINE

## 2019-06-11 RX ORDER — TETANUS TOXOID, REDUCED DIPHTHERIA TOXOID AND ACELLULAR PERTUSSIS VACCINE, ADSORBED 5; 2.5; 8; 8; 2.5 [IU]/.5ML; [IU]/.5ML; UG/.5ML; UG/.5ML; UG/.5ML
SUSPENSION INTRAMUSCULAR
COMMUNITY
Start: 2019-03-04 | End: 2019-06-11

## 2019-06-11 NOTE — PROGRESS NOTES
"(Portions of this note were dictated using voice recognition software and may contain dictation related errors in spelling/grammar/syntax not found on text review)    CC:   Chief Complaint   Patient presents with    Follow-up       HPI: 73 y.o. male Seen in February to establish care.  Blood pressure elevated, has stated to be diagnosed with white coat hypertension.  On no medication for this.  Checks at home in usually gets 120 to 130 systolic readings over 70 diastolic range.  Presented for nurse visit on 03/06/2019:  Reviewed as follows:    :"Pt came in for blood pressure check 3/6/2019.   Self machine read- 175/95  Office machine read-161/89   Second reading on in office machine- 147/84"    DJD severe left hip, also knee DJD.  X-rays on file from 2016.  Given meloxicam that he takes roughly 3 times a week.  Plain follow-up with his orthopedist, Dr. Wei was discussing hip replacement.  Planning to get this sometime during the summer, does not have an exact date planned but was told that he needs an okay from PCP to do this.  Denies any chest pain or shortness of breath.  Works out at the gym twice a week with upper body activities without any difficulty.  Can't do a lot a lower body activities because of his hip problems.     Abnormal glucose as below, couple of readings in the 130s, unknown fasting status for all of these.  Did have an A1c of 6.2.  Repeat fasting glucose was 137.  Trying to adjust his diet, cut out a lot of sweets.  Was drinking a lot of Coke and lemonade, has been trying to eliminate these          Home readings :              Past Medical History:   Diagnosis Date    Arthritis     Hyperlipidemia     IFG (impaired fasting glucose)        Past Surgical History:   Procedure Laterality Date    TONSILLECTOMY         Family History   Problem Relation Age of Onset    Cancer Mother         unknown, met to spine    Hypertension Mother     Osteoarthritis Mother     Deep vein thrombosis " Father     Heart attack Father     Pulmonary embolism Father     Emphysema Father     Arthritis Sister     No Known Problems Daughter     No Known Problems Son     Fibromyalgia Sister     Breast cancer Maternal Aunt     Heart disease Neg Hx     Prostate cancer Neg Hx     Colon cancer Neg Hx     Diabetes Neg Hx        Social History     Socioeconomic History    Marital status:      Spouse name: Not on file    Number of children: Not on file    Years of education: Not on file    Highest education level: Not on file   Occupational History    Not on file   Social Needs    Financial resource strain: Not on file    Food insecurity:     Worry: Not on file     Inability: Not on file    Transportation needs:     Medical: Not on file     Non-medical: Not on file   Tobacco Use    Smoking status: Never Smoker    Smokeless tobacco: Never Used   Substance and Sexual Activity    Alcohol use: Yes     Comment: Occasionally    Drug use: No    Sexual activity: Yes     Partners: Female   Lifestyle    Physical activity:     Days per week: Not on file     Minutes per session: Not on file    Stress: Not on file   Relationships    Social connections:     Talks on phone: Not on file     Gets together: Not on file     Attends Hoahaoism service: Not on file     Active member of club or organization: Not on file     Attends meetings of clubs or organizations: Not on file     Relationship status: Not on file   Other Topics Concern    Not on file   Social History Narrative    Lives in [place] with his wife. He is retired from [employer]. He is originally from [facility]. 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  2 daughters with marital and family problems. One of them is  and remarried because her  has a serious anger problems. His other son-in-law is in treatment for drug and alcohol problems. He has 5 grand children. He was in the [employer]. He goes to the gym. His son loves to hunt. He bought a small RV     Lab  Results   Component Value Date    WBC 4.7 02/25/2019    HGB 16.0 02/25/2019    HCT 47.5 02/25/2019    MCV 91.0 02/25/2019     02/25/2019    CHOL 147 06/05/2019    TRIG 190 (H) 06/05/2019    HDL 35 (L) 06/05/2019    ALT 23 06/05/2019    AST 20 06/05/2019    BILITOT 0.6 06/05/2019    ALKPHOS 60 06/05/2019     06/05/2019    K 4.2 06/05/2019     06/05/2019    CREATININE 1.10 06/05/2019    CALCIUM 9.6 06/05/2019    ALBUMIN 4.6 06/05/2019    BUN 14 06/05/2019    CO2 30 06/05/2019    TSH 1.55 02/25/2019    HGBA1C 6.2 (H) 06/05/2019    MICROALBUR 0.6 06/05/2019    LDLCALC 84 06/05/2019     (H) 06/05/2019         Hemoglobin A1C   Date Value Ref Range Status   06/05/2019 6.2 (H) <5.7 % of total Hgb Final     Comment:     For someone without known diabetes, a hemoglobin   A1c value between 5.7% and 6.4% is consistent with  prediabetes and should be confirmed with a   follow-up test.     For someone with known diabetes, a value <7%  indicates that their diabetes is well controlled. A1c  targets should be individualized based on duration of  diabetes, age, comorbid conditions, and other  considerations.     This assay result is consistent with an increased risk  of diabetes.     Currently, no consensus exists regarding use of  hemoglobin A1c for diagnosis of diabetes for children.        02/25/2019 6.6 (H) <5.7 % of total Hgb Final     Comment:     For someone without known diabetes, a hemoglobin A1c  value of 6.5% or greater indicates that they may have   diabetes and this should be confirmed with a follow-up   test.     For someone with known diabetes, a value <7% indicates   that their diabetes is well controlled and a value   greater than or equal to 7% indicates suboptimal   control. A1c targets should be individualized based on   duration of diabetes, age, comorbid conditions, and   other considerations.     Currently, no consensus exists regarding use of  hemoglobin A1c for diagnosis of diabetes  for children.         03/12/2018 6.0 (H) 4.0 - 5.6 % Final     Comment:     According to ADA guidelines, hemoglobin A1c <7.0% represents  optimal control in non-pregnant diabetic patients. Different  metrics may apply to specific patient populations.   Standards of Medical Care in Diabetes-2016.  For the purpose of screening for the presence of diabetes:  <5.7%     Consistent with the absence of diabetes  5.7-6.4%  Consistent with increasing risk for diabetes   (prediabetes)  >or=6.5%  Consistent with diabetes  Currently, no consensus exists for use of hemoglobin A1c  for diagnosis of diabetes for children.  This Hemoglobin A1c assay has significant interference with fetal   hemoglobin   (HbF). The results are invalid for patients with abnormal amounts of   HbF,   including those with known Hereditary Persistence   of Fetal Hemoglobin. Heterozygous hemoglobin variants (HbAS, HbAC,   HbAD, HbAE, HbA2) do not significantly interfere with this assay;   however, presence of multiple variants in a sample may impact the %   interference.     08/19/2015 5.7 4.5 - 6.2 % Final     Glucose   Date Value Ref Range Status   06/05/2019 137 (H) 65 - 99 mg/dL Final     Comment:                   Fasting reference interval     For someone without known diabetes, a glucose  value >125 mg/dL indicates that they may have  diabetes and this should be confirmed with a  follow-up test.        02/25/2019 133 (H) 65 - 99 mg/dL Final     Comment:                   Fasting reference interval     For someone without known diabetes, a glucose  value >125 mg/dL indicates that they may have  diabetes and this should be confirmed with a  follow-up test.        03/12/2018 132 (H) 70 - 110 mg/dL Final   09/23/2016 137 (H) 70 - 110 mg/dL Final       ROS:  GENERAL: No fever, chills, fatigability or weight loss.  SKIN: No rashes, no itching.  HEAD: No headaches.  EYES: No visual changes  EARS: No ear pain or changes in hearing.  NOSE: No congestion or  rhinorrhea.  MOUTH & THROAT: No hoarseness, change in voice, or sore throat.  NODES: Denies swollen glands.  CHEST: Denies DORMAN, cyanosis, wheezing, cough and sputum production.  CARDIOVASCULAR: Denies chest pain, PND, orthopnea.  ABDOMEN: No nausea, vomiting, or changes in bowel function.  URINARY: No flank pain, dysuria or hematuria.  PERIPHERAL VASCULAR: No claudication or cyanosis.  MUSCULOSKELETAL:  Above.  NEUROLOGIC: No weakness or numbness.    Vital signs reviewed  PE:   APPEARANCE: Well nourished, well developed, in no acute distress.    HEAD: Normocephalic, atraumatic.  EYES: PERRL. EOMI.   Conjunctivae noninjected.  EARS: TM's intact. Light reflex normal. No retraction or perforation  NOSE: Mucosa pink. Airway clear.  MOUTH & THROAT: No tonsillar enlargement. No pharyngeal erythema or exudate.   NECK: Supple with no cervical lymphadenopathy.  No carotid bruits.  No thyromegaly  CHEST: Good inspiratory effort. Lungs clear to auscultation with no wheezes or crackles.  CARDIOVASCULAR: Normal S1, S2. No rubs, murmurs, or gallops.  ABDOMEN: Bowel sounds normal. Not distended. Soft. No tenderness or masses. No organomegaly.  EXTREMITIES: No edema, cyanosis, or clubbing.      IMPRESSION  1. Pure hypercholesterolemia    2. Type 2 diabetes mellitus without complication, without long-term current use of insulin    3. Arthritis, hip    4. White coat syndrome with hypertension            PLAN  Reviewed recent labs.  Discussed fasting blood sugar criteria for diabetes.  Can hold off on meds at this time given fairly stable A1c.  Continue to monitor.  Continue dietary precautions, not only reducing sweets but also high carbohydrate foods in general.  Continue with regular exercise.  Hopefully after hip replacement he can do more exercise    Continue statin for hyperlipidemia    Does have several elevated blood pressure readings at home but largely are below 140/90.  Discussed white coat hypertension.  At this time we  can continue to monitor.  Hopefully with progressed diet and exercise changes this can even stabilize further.  May consider medication management for any prolonged elevated readings.  I am hoping that after hip replacement surgery and with improved function, he can even reduce his NSAID use which may help with his blood pressure control overall as well    Medically patient is stable for any planned upcoming hip replacement surgery.  Has no untreated cardiovascular symptoms of concern.  Discussed that he should check with his orthopedist on specific timing of any further testing if needed in relation to planned surgical date.  He can let us know if any other information or testing is required      Recheck in about 3-4 months

## 2019-07-05 ENCOUNTER — PATIENT MESSAGE (OUTPATIENT)
Dept: FAMILY MEDICINE | Facility: CLINIC | Age: 74
End: 2019-07-05

## 2019-07-05 NOTE — TELEPHONE ENCOUNTER
I have written up a note of medical stability that your surgeon can have.  I am not sure if the medical record system sent directly to his office or if you may need to  a copy of this along with your recent medical note from my office.  I will have these printed out for you should you want to come by and pick them up    Marc Lakhani MD    ===View-only below this line===      ----- Message -----     From: Joi Castellanos     Sent: 7/5/2019 11:16 AM CDT       To: Marc Lakhani MD  Subject: Non-Urgent Medical    I have scheduled for hip surgery on Aug. 5. I need something in writing from you for Dr. Wei. I know that we discussed this at my last appt. What is my next step?  ----- Message -----  From: Nurse Naik  Sent: 6/6/2019  1:37 PM CDT  To: Joi Castellanos  Subject: RE: Non-Urgent Medical  We would need to schedule you for a pre-op exam.   These are due within 30 days of your surgery.  Would you like me to help you schedule?    ----- Message -----     From: Joi Castellanos     Sent: 6/6/2019  1:23 PM CDT       To: Marc Lakhani MD  Subject: Non-Urgent Medical    Did my labwork for follow up yesterday.   As a heads up, Dr. Wei would like an ok from you in advance of my up-coming hip surgery.   If there is anything extra you need for this ok, can we take care of it at my appt on 06/11/2019?

## 2019-07-05 NOTE — LETTER
July 5, 2019        Frandy Wei MD  [id number] [facility]  [facility] [address]             [address], [address]  [facility] [address]  Phone: [phone]  Fax: [phone]   Patient: Joi Castellanos   MR Number: [id number]   YOB: 1945   Date of Visit: 7/5/2019       Dear Dr. Wei:    Thank you for referring Joi Castellanos to me for evaluation.  He was seen here on June 11, 2019.  He is medically stable for upcoming planned surgery.  Please see my attached note for further details.            If you have questions, please do not hesitate to call me. I look forward to following Joi along with you.    Sincerely,      Marc Lakhani MD           CC  No Recipients

## 2020-03-16 ENCOUNTER — PATIENT MESSAGE (OUTPATIENT)
Dept: FAMILY MEDICINE | Facility: CLINIC | Age: 75
End: 2020-03-16

## 2020-03-23 DIAGNOSIS — E78.00 PURE HYPERCHOLESTEROLEMIA: ICD-10-CM

## 2020-03-23 RX ORDER — ROSUVASTATIN CALCIUM 20 MG/1
TABLET, COATED ORAL
Qty: 90 TABLET | Refills: 3 | Status: SHIPPED | OUTPATIENT
Start: 2020-03-23 | End: 2021-06-13

## 2020-09-21 ENCOUNTER — PATIENT MESSAGE (OUTPATIENT)
Dept: FAMILY MEDICINE | Facility: CLINIC | Age: 75
End: 2020-09-21

## 2020-09-22 ENCOUNTER — PATIENT MESSAGE (OUTPATIENT)
Dept: FAMILY MEDICINE | Facility: CLINIC | Age: 75
End: 2020-09-22

## 2020-09-25 ENCOUNTER — LAB VISIT (OUTPATIENT)
Dept: LAB | Facility: HOSPITAL | Age: 75
End: 2020-09-25
Attending: FAMILY MEDICINE
Payer: MEDICARE

## 2020-09-25 ENCOUNTER — OFFICE VISIT (OUTPATIENT)
Dept: FAMILY MEDICINE | Facility: CLINIC | Age: 75
End: 2020-09-25
Payer: MEDICARE

## 2020-09-25 VITALS
BODY MASS INDEX: 26.42 KG/M2 | SYSTOLIC BLOOD PRESSURE: 136 MMHG | HEART RATE: 77 BPM | HEIGHT: 73 IN | OXYGEN SATURATION: 98 % | WEIGHT: 199.31 LBS | TEMPERATURE: 98 F | DIASTOLIC BLOOD PRESSURE: 88 MMHG

## 2020-09-25 DIAGNOSIS — R03.0 ELEVATED BLOOD-PRESSURE READING WITHOUT DIAGNOSIS OF HYPERTENSION: ICD-10-CM

## 2020-09-25 DIAGNOSIS — Z12.11 COLON CANCER SCREENING: ICD-10-CM

## 2020-09-25 DIAGNOSIS — E11.9 TYPE 2 DIABETES MELLITUS WITHOUT COMPLICATION, WITHOUT LONG-TERM CURRENT USE OF INSULIN: ICD-10-CM

## 2020-09-25 DIAGNOSIS — Z00.00 ENCOUNTER FOR PREVENTIVE HEALTH EXAMINATION: Primary | ICD-10-CM

## 2020-09-25 DIAGNOSIS — E78.5 HYPERLIPIDEMIA, UNSPECIFIED HYPERLIPIDEMIA TYPE: ICD-10-CM

## 2020-09-25 DIAGNOSIS — Z00.00 ENCOUNTER FOR PREVENTIVE HEALTH EXAMINATION: ICD-10-CM

## 2020-09-25 LAB
ALBUMIN/CREAT UR: 19.3 UG/MG (ref 0–30)
CREAT UR-MCNC: 135 MG/DL (ref 23–375)
MICROALBUMIN UR DL<=1MG/L-MCNC: 26 UG/ML

## 2020-09-25 PROCEDURE — 99397 PR PREVENTIVE VISIT,EST,65 & OVER: ICD-10-PCS | Mod: HCNC,S$GLB,, | Performed by: FAMILY MEDICINE

## 2020-09-25 PROCEDURE — 99499 RISK ADDL DX/OHS AUDIT: ICD-10-PCS | Mod: HCNC,S$GLB,, | Performed by: FAMILY MEDICINE

## 2020-09-25 PROCEDURE — 3075F PR MOST RECENT SYSTOLIC BLOOD PRESS GE 130-139MM HG: ICD-10-PCS | Mod: HCNC,CPTII,S$GLB, | Performed by: FAMILY MEDICINE

## 2020-09-25 PROCEDURE — 99999 PR PBB SHADOW E&M-EST. PATIENT-LVL IV: CPT | Mod: PBBFAC,HCNC,, | Performed by: FAMILY MEDICINE

## 2020-09-25 PROCEDURE — 99397 PER PM REEVAL EST PAT 65+ YR: CPT | Mod: HCNC,S$GLB,, | Performed by: FAMILY MEDICINE

## 2020-09-25 PROCEDURE — 99499 UNLISTED E&M SERVICE: CPT | Mod: HCNC,S$GLB,, | Performed by: FAMILY MEDICINE

## 2020-09-25 PROCEDURE — 82043 UR ALBUMIN QUANTITATIVE: CPT | Mod: HCNC

## 2020-09-25 PROCEDURE — 3079F DIAST BP 80-89 MM HG: CPT | Mod: HCNC,CPTII,S$GLB, | Performed by: FAMILY MEDICINE

## 2020-09-25 PROCEDURE — 3075F SYST BP GE 130 - 139MM HG: CPT | Mod: HCNC,CPTII,S$GLB, | Performed by: FAMILY MEDICINE

## 2020-09-25 PROCEDURE — 3079F PR MOST RECENT DIASTOLIC BLOOD PRESSURE 80-89 MM HG: ICD-10-PCS | Mod: HCNC,CPTII,S$GLB, | Performed by: FAMILY MEDICINE

## 2020-09-25 PROCEDURE — 99999 PR PBB SHADOW E&M-EST. PATIENT-LVL IV: ICD-10-PCS | Mod: PBBFAC,HCNC,, | Performed by: FAMILY MEDICINE

## 2020-09-25 NOTE — PATIENT INSTRUCTIONS
"Look into getting the Shingles vaccine (SHINGRIX) at your local pharmacy (2 part series, done once at/after age 50)      Lifestyle choices to lower blood pressure    Diet  DASH diet--high in fruits/vegetables and low in fat and saturated fat: 8-14 points  Salt restriction--2.3 grams sodium/day: 2-8 points    Weight loss--5-20 points per 20 lbs lost.    Exercise--30min most days/wk: 4-9 points    Limit Alcohol--2/day men; 1/day women: 2-4 points.        The DASH Diet: Healthy Eating to Control Your Blood Pressure     What is the DASH diet?    DASH stands for Dietary Approaches to Stop Hypertension. It is a balanced eating plan that your family doctor might recommend to help you lower your blood pressure. The DASH diet:   Is low in salt, saturated fat, cholesterol and total fat.    Emphasizes fruits, vegetables, and fat-free or low-fat milk and milk products.    Includes whole grains, fish, poultry and nuts.    Limits the amount of red meat, sweets, added sugars and sugar-containing beverages in your diet.    Is rich in potassium, magnesium and calcium, as well as protein and fiber.      How can the DASH diet help me stay healthy?  Getting too much sodium (salt) in your diet can contribute to high blood pressure (also called hypertension). Some salt is in foods naturally, and some salt is added to food when it is processed or prepared. Following the DASH diet can help you lower your blood pressure, or prevent high blood pressure, by reducing the amount of sodium in your diet to less than 2,300 mg per day.  The fruits, vegetables and whole grains recommended in the DASH diet provide many other elements of a healthy diet, such as lycopene, beta-carotene and isoflavones. These can help protect your body against common health conditions, such as cancer, osteoporosis, stroke and diabetes. Following the DASH diet can also help reduce your risk of heart disease by lowering your low-density lipoprotein (LDL, or "bad") " cholesterol level.  Following the DASH diet may drop your blood pressure by a few points in as little as 2 weeks. However, you should not stop taking your blood pressure medicine, or any other prescribed medicine, without talking to your doctor first.    What kinds of foods are included in the DASH diet?  The DASH diet is nutritionally balanced for good health. You dont need to buy any special foods or pills, or cook with any special recipes, to follow the DASH diet. If you follow the DASH diet, you will eat about 2,000 calories each day. These calories will come from a variety of foods.    Where is sodium in my diet?  Food Serving Sodium Content   ¼ teaspoon table salt 575 mg   ½ teaspoon table salt 1,150 mg   1 teaspoon table salt 2,300 mg   1 hot dog 460 mg   1 regular fast-food hamburger 600 mg   2 ounces processed cheese 600 mg   1 tablespoon soy sauce 900 mg   1 serving frozen pizza with meat and vegetables 982 mg   8 ounces regular potato chips 1,192 mg     The DASH diet   Whole grains (6 to 8 servings a day)    Vegetables (4 to 5 servings a day)    Fruits (4 to 5 servings a day)    Low-fat or fat-free milk and milk products (2 to 3 servings a day)    Lean meats, poultry and fish (6 or fewer servings a day)    Nuts, seeds and beans (4 to 5 servings a week)    Fats and oils (2 to 3 servings a day)    Sweets, preferably low-fat or fat-free (5 or fewer a week)    Sodium (no more than 2,300 mg a day)   You can adapt the DASH diet to meet your needs. For example:   If you drink alcohol, limit yourself to 2 drinks or less per day for men and 1 drink or less per day for women.    To reduce your blood pressure even more, replace some of the carbohydrates in the DASH diet with low-fat protein and unsaturated fats.    If you need to lose weight, reduce the number of calories you eat to about 1,600 per day.    Follow a lower-sodium version (no more than 1,500 mg daily) if you are 40 years of age or older,  "you are  or you already have high blood pressure.      How can I change my eating habits?  Dont be discouraged if following the DASH diet is difficult at first. Start with small, achievable goals. The following ideas can help you make healthy changes:   Pay attention to your current eating habits. Make a list of everything you eat for 2 or 3 days. Compare this list to the DASH diet recommendations above and see what changes you need to make in your diet.    Make one change at a time. For example, start by choosing lower-fat versions of your favorite foods or adding more whole grains to your meals.    Learn what makes a serving for each type of food. For example, 1 serving equals 1 slice of bread, 8 ounces of milk, 1 cup of raw vegetables or 1/2 cup of cooked vegetables. For more serving sizes, go to the [facility] site. Dont have a measuring cup? One serving (3 ounces) of meat or poultry is about the size of a deck of cards. One serving (1/2 cup) of rice or potato looks like half a baseball, and a serving of cheese is about the size of 4 stacked dice.    If eating more fruits and vegetables gives you gas, bloating or diarrhea, increase these foods slowly. You can also talk to your family doctor about taking over-the-counter medicines to reduce these symptoms until your body adjusts.    Get more exercise. Physical activity helps lower your blood pressure and can help you lose more weight.    Use salt-free seasonings, such as spices and herbs, to add flavor to your recipes and reduce or eliminate salt.    Include as many fresh and unprocessed foods as possible. Cut back on frozen dinners, packaged mixes, canned soups and bottled salad dressings, which are often high in sodium.    When buying canned, frozen or processed foods, check nutrition labels for the amount of sodium, sugar and saturated fat. Look for the phrases "no salt added," "sodium-free," "low sodium" or "very low sodium" on food " packages. Choose foods with monounsaturated and polyunsaturated fats.    Steam, grill, poach, roast or stir-egan foods. Use low-sodium broth or water instead of butter or oil for sautéing.    When you eat at a restaurant, ask how foods are prepared. Ask if your order can be made without added salt. Dont add salty condiments, such as ketchup, mustard, pickles or sauces, to your food.   Other Organizations   Centers for Disease Control and Prevention    National Heart, Lung, and Blood [place]   Written by [email or web address] editorial staff  Reviewed/Updated: 02/11  Created: 08/09

## 2020-09-25 NOTE — PROGRESS NOTES
(Portions of this note were dictated using voice recognition software and may contain dictation related errors in spelling/grammar/syntax not found on text review)    CC:   Annual    HPI: 74 y.o. male Annual exam    Dyslipidemia on Crestor 20 mg daily    Elevated blood pressure, states diagnosis with white coat hypertension.  Prior home blood pressure monitor readings similar to in office  office blood pressure monitor when done simultaneously..  Blood pressure stable today.  Has lost significant amount of weight. Has reported high bp at home.  Asymptomatic.  Trying to watch sodium intake.  Has been exercising on his bike 4 miles a day.  He has to go to the gym but has not since CoVID pandemic.  Does drink 4 cups of coffee in the morning, thinking about cutting back.  No smoking.  No alcohol.            DJD severe left hip, also knee DJD.  X-rays on file from 2016.  Given meloxicam that he takes roughly 3 times a week.  follow-up with his orthopedist, Dr. Wei had hip replacement--no longer on meloxicam    IFG--> DM 2 by fasting sugar criteria:  Last labs as below, last blood sugar done last year was 137, needs repeat       Past Medical History:   Diagnosis Date    Arthritis     Diabetes mellitus, type II     fasting bg criteria    Hyperlipidemia        Past Surgical History:   Procedure Laterality Date    TONSILLECTOMY         Family History   Problem Relation Age of Onset    Cancer Mother         unknown, met to spine    Hypertension Mother     Osteoarthritis Mother     Deep vein thrombosis Father     Heart attack Father     Pulmonary embolism Father     Emphysema Father     Arthritis Sister     No Known Problems Daughter     No Known Problems Son     Fibromyalgia Sister     Breast cancer Maternal Aunt     Heart disease Neg Hx     Prostate cancer Neg Hx     Colon cancer Neg Hx     Diabetes Neg Hx        Social History     Socioeconomic History    Marital status:      Spouse name:  Not on file    Number of children: Not on file    Years of education: Not on file    Highest education level: Not on file   Occupational History    Not on file   Social Needs    Financial resource strain: Not on file    Food insecurity     Worry: Not on file     Inability: Not on file    Transportation needs     Medical: Not on file     Non-medical: Not on file   Tobacco Use    Smoking status: Never Smoker    Smokeless tobacco: Never Used   Substance and Sexual Activity    Alcohol use: Yes     Comment: Occasionally    Drug use: No    Sexual activity: Yes     Partners: Female   Lifestyle    Physical activity     Days per week: Not on file     Minutes per session: Not on file    Stress: Not on file   Relationships    Social connections     Talks on phone: Not on file     Gets together: Not on file     Attends Christian service: Not on file     Active member of club or organization: Not on file     Attends meetings of clubs or organizations: Not on file     Relationship status: Not on file   Other Topics Concern    Not on file   Social History Narrative    Lives in [place] with his wife. He is retired from [employer]. He is [place] from [place]. 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  2 daughters with marital and family problems. One of them is  and remarried because her  has a serious anger problems. His other son-in-law is in treatment for drug and alcohol problems. He has 5 grand children. He was in the [employer]. He goes to the gym. His son loves to hunt. He bought a small RV       Lab Results   Component Value Date    WBC 4.7 02/25/2019    HGB 16.0 02/25/2019    HCT 47.5 02/25/2019    MCV 91.0 02/25/2019     02/25/2019    CHOL 147 06/05/2019    TRIG 190 (H) 06/05/2019    HDL 35 (L) 06/05/2019    ALT 23 06/05/2019    AST 20 06/05/2019    BILITOT 0.6 06/05/2019    ALKPHOS 60 06/05/2019     06/05/2019    K 4.2 06/05/2019     06/05/2019    CREATININE 1.10 06/05/2019    ESTGFRAFRICA  77 06/05/2019    EGFRNONAA 67 06/05/2019    CALCIUM 9.6 06/05/2019    ALBUMIN 4.6 06/05/2019    BUN 14 06/05/2019    CO2 30 06/05/2019    TSH 1.55 02/25/2019    HGBA1C 6.2 (H) 06/05/2019    MICROALBUR 0.6 06/05/2019    MICALBCREAT 9 06/05/2019    LDLCALC 84 06/05/2019     (H) 06/05/2019                         Vital signs reviewed  PE:   APPEARANCE: Well nourished, well developed, in no acute distress.    HEAD: Normocephalic, atraumatic.  EYES: PERRL. EOMI.   Conjunctivae noninjected.  EARS: TM's intact. Light reflex normal. No retraction or perforation.    NECK: Supple with no cervical lymphadenopathy.  No carotid bruits.  No thyromegaly  CHEST: Good inspiratory effort. Lungs clear to auscultation with no wheezes or crackles.  CARDIOVASCULAR: Normal S1, S2. No rubs, murmurs, or gallops.  ABDOMEN: Bowel sounds normal. Not distended. Soft. No tenderness or masses. No organomegaly.  EXTREMITIES: No edema,       IMPRESSION  1. Encounter for preventive health examination    2. Type 2 diabetes mellitus without complication, without long-term current use of insulin    3. Hyperlipidemia, unspecified hyperlipidemia type    4. Colon cancer screening    5. Elevated blood-pressure reading without diagnosis of hypertension        PLAN  Orders Placed This Encounter   Procedures    CBC auto differential    Comprehensive metabolic panel    Lipid Panel    TSH    Hemoglobin A1C    Microalbumin/creatinine urine ratio     BP recheck was 140/80.  Reviewed his home readings.  Advise cutting down on caffeine.  One-month nurse check for blood pressure with his home meter to be checked alongside our meter again.    Continue regular exercise, healthy diet, sodium reduction.  Dash diet literature provided.    Recheck labs above             SCREENINGS (males >=65)     Immunizations:  tdap:   March 2019  Zoster:  Can get at pharmacy  PCV 2016  PVX 2018  Declines flu vaccine     Colon cancer screen: FIT kit neg 2019 (c/scope  2006-normal), reordered     Hepatitis-C screening up-to-date 2016

## 2020-09-29 ENCOUNTER — LAB VISIT (OUTPATIENT)
Dept: LAB | Facility: HOSPITAL | Age: 75
End: 2020-09-29
Attending: FAMILY MEDICINE
Payer: MEDICARE

## 2020-09-29 ENCOUNTER — PATIENT MESSAGE (OUTPATIENT)
Dept: OTHER | Facility: OTHER | Age: 75
End: 2020-09-29

## 2020-09-29 DIAGNOSIS — Z12.11 COLON CANCER SCREENING: ICD-10-CM

## 2020-09-29 PROCEDURE — 82274 ASSAY TEST FOR BLOOD FECAL: CPT | Mod: HCNC

## 2020-10-01 LAB — HEMOCCULT STL QL IA: NEGATIVE

## 2020-10-02 ENCOUNTER — PATIENT MESSAGE (OUTPATIENT)
Dept: FAMILY MEDICINE | Facility: CLINIC | Age: 75
End: 2020-10-02

## 2020-10-02 NOTE — PROGRESS NOTES
Dear Joi Castellanos    Your FIT test (colon cancer screen) is negative.  You should repeat this test yearly.    Marc Lakhani MD

## 2020-10-02 NOTE — TELEPHONE ENCOUNTER
Dear Joi Castellanos     Your recent labs were reviewed and released to your account. Your lab results were mainly normal but your blood sugar still was elevated in the diabetes range.  I think we can manage this right now just with healthy diet, exercise, and weight control and not have to do medications just yet.  I do would recommend checking back with me in 3 months and we can recheck labs to trend your sugar readings out.     Marc Lakhani MD

## 2020-12-03 ENCOUNTER — PATIENT MESSAGE (OUTPATIENT)
Dept: FAMILY MEDICINE | Facility: CLINIC | Age: 75
End: 2020-12-03

## 2020-12-10 ENCOUNTER — OFFICE VISIT (OUTPATIENT)
Dept: FAMILY MEDICINE | Facility: CLINIC | Age: 75
End: 2020-12-10
Payer: MEDICARE

## 2020-12-10 ENCOUNTER — LAB VISIT (OUTPATIENT)
Dept: LAB | Facility: HOSPITAL | Age: 75
End: 2020-12-10
Attending: FAMILY MEDICINE
Payer: MEDICARE

## 2020-12-10 VITALS
TEMPERATURE: 98 F | OXYGEN SATURATION: 99 % | SYSTOLIC BLOOD PRESSURE: 146 MMHG | BODY MASS INDEX: 26.97 KG/M2 | HEART RATE: 73 BPM | HEIGHT: 73 IN | WEIGHT: 203.5 LBS | DIASTOLIC BLOOD PRESSURE: 86 MMHG

## 2020-12-10 DIAGNOSIS — E11.9 TYPE 2 DIABETES MELLITUS WITHOUT COMPLICATION, WITHOUT LONG-TERM CURRENT USE OF INSULIN: Primary | ICD-10-CM

## 2020-12-10 DIAGNOSIS — E11.9 TYPE 2 DIABETES MELLITUS WITHOUT COMPLICATION, WITHOUT LONG-TERM CURRENT USE OF INSULIN: ICD-10-CM

## 2020-12-10 DIAGNOSIS — I10 BENIGN ESSENTIAL HTN: ICD-10-CM

## 2020-12-10 DIAGNOSIS — E78.5 HYPERLIPIDEMIA, UNSPECIFIED HYPERLIPIDEMIA TYPE: ICD-10-CM

## 2020-12-10 LAB
ALBUMIN SERPL BCP-MCNC: 4.5 G/DL (ref 3.5–5.2)
ALP SERPL-CCNC: 61 U/L (ref 55–135)
ALT SERPL W/O P-5'-P-CCNC: 22 U/L (ref 10–44)
ANION GAP SERPL CALC-SCNC: 9 MMOL/L (ref 8–16)
AST SERPL-CCNC: 24 U/L (ref 10–40)
BASOPHILS # BLD AUTO: 0.01 K/UL (ref 0–0.2)
BASOPHILS NFR BLD: 0.3 % (ref 0–1.9)
BILIRUB SERPL-MCNC: 0.7 MG/DL (ref 0.1–1)
BUN SERPL-MCNC: 17 MG/DL (ref 8–23)
CALCIUM SERPL-MCNC: 9.5 MG/DL (ref 8.7–10.5)
CHLORIDE SERPL-SCNC: 100 MMOL/L (ref 95–110)
CHOLEST SERPL-MCNC: 154 MG/DL (ref 120–199)
CHOLEST/HDLC SERPL: 4.1 {RATIO} (ref 2–5)
CO2 SERPL-SCNC: 30 MMOL/L (ref 23–29)
CREAT SERPL-MCNC: 1 MG/DL (ref 0.5–1.4)
DIFFERENTIAL METHOD: ABNORMAL
EOSINOPHIL # BLD AUTO: 0.1 K/UL (ref 0–0.5)
EOSINOPHIL NFR BLD: 1.3 % (ref 0–8)
ERYTHROCYTE [DISTWIDTH] IN BLOOD BY AUTOMATED COUNT: 11.8 % (ref 11.5–14.5)
EST. GFR  (AFRICAN AMERICAN): >60 ML/MIN/1.73 M^2
EST. GFR  (NON AFRICAN AMERICAN): >60 ML/MIN/1.73 M^2
ESTIMATED AVG GLUCOSE: 126 MG/DL (ref 68–131)
GLUCOSE SERPL-MCNC: 134 MG/DL (ref 70–110)
HBA1C MFR BLD HPLC: 6 % (ref 4–5.6)
HCT VFR BLD AUTO: 43.8 % (ref 40–54)
HDLC SERPL-MCNC: 38 MG/DL (ref 40–75)
HDLC SERPL: 24.7 % (ref 20–50)
HGB BLD-MCNC: 15.5 G/DL (ref 14–18)
IMM GRANULOCYTES # BLD AUTO: 0.01 K/UL (ref 0–0.04)
IMM GRANULOCYTES NFR BLD AUTO: 0.3 % (ref 0–0.5)
LDLC SERPL CALC-MCNC: 91.6 MG/DL (ref 63–159)
LYMPHOCYTES # BLD AUTO: 1.6 K/UL (ref 1–4.8)
LYMPHOCYTES NFR BLD: 42.8 % (ref 18–48)
MCH RBC QN AUTO: 32.3 PG (ref 27–31)
MCHC RBC AUTO-ENTMCNC: 35.4 G/DL (ref 32–36)
MCV RBC AUTO: 91 FL (ref 82–98)
MONOCYTES # BLD AUTO: 0.3 K/UL (ref 0.3–1)
MONOCYTES NFR BLD: 8.2 % (ref 4–15)
NEUTROPHILS # BLD AUTO: 1.8 K/UL (ref 1.8–7.7)
NEUTROPHILS NFR BLD: 47.1 % (ref 38–73)
NONHDLC SERPL-MCNC: 116 MG/DL
NRBC BLD-RTO: 0 /100 WBC
PLATELET # BLD AUTO: 185 K/UL (ref 150–350)
PMV BLD AUTO: 9.6 FL (ref 9.2–12.9)
POTASSIUM SERPL-SCNC: 4.5 MMOL/L (ref 3.5–5.1)
PROT SERPL-MCNC: 8.2 G/DL (ref 6–8.4)
RBC # BLD AUTO: 4.8 M/UL (ref 4.6–6.2)
SODIUM SERPL-SCNC: 139 MMOL/L (ref 136–145)
TRIGL SERPL-MCNC: 122 MG/DL (ref 30–150)
WBC # BLD AUTO: 3.76 K/UL (ref 3.9–12.7)

## 2020-12-10 PROCEDURE — 1159F PR MEDICATION LIST DOCUMENTED IN MEDICAL RECORD: ICD-10-PCS | Mod: HCNC,S$GLB,, | Performed by: FAMILY MEDICINE

## 2020-12-10 PROCEDURE — 3044F HG A1C LEVEL LT 7.0%: CPT | Mod: HCNC,CPTII,S$GLB, | Performed by: FAMILY MEDICINE

## 2020-12-10 PROCEDURE — 3077F SYST BP >= 140 MM HG: CPT | Mod: HCNC,CPTII,S$GLB, | Performed by: FAMILY MEDICINE

## 2020-12-10 PROCEDURE — 1101F PT FALLS ASSESS-DOCD LE1/YR: CPT | Mod: HCNC,CPTII,S$GLB, | Performed by: FAMILY MEDICINE

## 2020-12-10 PROCEDURE — 3008F BODY MASS INDEX DOCD: CPT | Mod: HCNC,CPTII,S$GLB, | Performed by: FAMILY MEDICINE

## 2020-12-10 PROCEDURE — 99214 OFFICE O/P EST MOD 30 MIN: CPT | Mod: HCNC,S$GLB,, | Performed by: FAMILY MEDICINE

## 2020-12-10 PROCEDURE — 3288F PR FALLS RISK ASSESSMENT DOCUMENTED: ICD-10-PCS | Mod: HCNC,CPTII,S$GLB, | Performed by: FAMILY MEDICINE

## 2020-12-10 PROCEDURE — 99999 PR PBB SHADOW E&M-EST. PATIENT-LVL III: ICD-10-PCS | Mod: PBBFAC,HCNC,, | Performed by: FAMILY MEDICINE

## 2020-12-10 PROCEDURE — 36415 COLL VENOUS BLD VENIPUNCTURE: CPT | Mod: HCNC

## 2020-12-10 PROCEDURE — 3008F PR BODY MASS INDEX (BMI) DOCUMENTED: ICD-10-PCS | Mod: HCNC,CPTII,S$GLB, | Performed by: FAMILY MEDICINE

## 2020-12-10 PROCEDURE — 99214 PR OFFICE/OUTPT VISIT, EST, LEVL IV, 30-39 MIN: ICD-10-PCS | Mod: HCNC,S$GLB,, | Performed by: FAMILY MEDICINE

## 2020-12-10 PROCEDURE — 3044F PR MOST RECENT HEMOGLOBIN A1C LEVEL <7.0%: ICD-10-PCS | Mod: HCNC,CPTII,S$GLB, | Performed by: FAMILY MEDICINE

## 2020-12-10 PROCEDURE — 85025 COMPLETE CBC W/AUTO DIFF WBC: CPT | Mod: HCNC

## 2020-12-10 PROCEDURE — 3079F DIAST BP 80-89 MM HG: CPT | Mod: HCNC,CPTII,S$GLB, | Performed by: FAMILY MEDICINE

## 2020-12-10 PROCEDURE — 99999 PR PBB SHADOW E&M-EST. PATIENT-LVL III: CPT | Mod: PBBFAC,HCNC,, | Performed by: FAMILY MEDICINE

## 2020-12-10 PROCEDURE — 1159F MED LIST DOCD IN RCRD: CPT | Mod: HCNC,S$GLB,, | Performed by: FAMILY MEDICINE

## 2020-12-10 PROCEDURE — 3079F PR MOST RECENT DIASTOLIC BLOOD PRESSURE 80-89 MM HG: ICD-10-PCS | Mod: HCNC,CPTII,S$GLB, | Performed by: FAMILY MEDICINE

## 2020-12-10 PROCEDURE — 3288F FALL RISK ASSESSMENT DOCD: CPT | Mod: HCNC,CPTII,S$GLB, | Performed by: FAMILY MEDICINE

## 2020-12-10 PROCEDURE — 1101F PR PT FALLS ASSESS DOC 0-1 FALLS W/OUT INJ PAST YR: ICD-10-PCS | Mod: HCNC,CPTII,S$GLB, | Performed by: FAMILY MEDICINE

## 2020-12-10 PROCEDURE — 83036 HEMOGLOBIN GLYCOSYLATED A1C: CPT | Mod: HCNC

## 2020-12-10 PROCEDURE — 3077F PR MOST RECENT SYSTOLIC BLOOD PRESSURE >= 140 MM HG: ICD-10-PCS | Mod: HCNC,CPTII,S$GLB, | Performed by: FAMILY MEDICINE

## 2020-12-10 PROCEDURE — 80061 LIPID PANEL: CPT | Mod: HCNC

## 2020-12-10 PROCEDURE — 80053 COMPREHEN METABOLIC PANEL: CPT | Mod: HCNC

## 2020-12-10 RX ORDER — LOSARTAN POTASSIUM 25 MG/1
25 TABLET ORAL DAILY
Qty: 30 TABLET | Refills: 11 | Status: SHIPPED | OUTPATIENT
Start: 2020-12-10 | End: 2021-01-07 | Stop reason: SDUPTHER

## 2020-12-10 RX ORDER — CHOLECALCIFEROL (VITAMIN D3) 25 MCG
1000 TABLET ORAL DAILY
COMMUNITY

## 2020-12-10 RX ORDER — ZINC GLUCONATE 50 MG
50 TABLET ORAL DAILY
COMMUNITY

## 2020-12-10 NOTE — PATIENT INSTRUCTIONS
"Lifestyle choices to lower blood pressure    Diet  DASH diet--high in fruits/vegetables and low in fat and saturated fat: 8-14 points  Salt restriction--2.3 grams sodium/day: 2-8 points    Weight loss--5-20 points per 20 lbs lost.    Exercise--30min most days/wk: 4-9 points    Limit Alcohol--2/day men; 1/day women: 2-4 points.        The DASH Diet: Healthy Eating to Control Your Blood Pressure     What is the DASH diet?    DASH stands for Dietary Approaches to Stop Hypertension. It is a balanced eating plan that your family doctor might recommend to help you lower your blood pressure. The DASH diet:   Is low in salt, saturated fat, cholesterol and total fat.    Emphasizes fruits, vegetables, and fat-free or low-fat milk and milk products.    Includes whole grains, fish, poultry and nuts.    Limits the amount of red meat, sweets, added sugars and sugar-containing beverages in your diet.    Is rich in potassium, magnesium and calcium, as well as protein and fiber.      How can the DASH diet help me stay healthy?  Getting too much sodium (salt) in your diet can contribute to high blood pressure (also called hypertension). Some salt is in foods naturally, and some salt is added to food when it is processed or prepared. Following the DASH diet can help you lower your blood pressure, or prevent high blood pressure, by reducing the amount of sodium in your diet to less than 2,300 mg per day.  The fruits, vegetables and whole grains recommended in the DASH diet provide many other elements of a healthy diet, such as lycopene, beta-carotene and isoflavones. These can help protect your body against common health conditions, such as cancer, osteoporosis, stroke and diabetes. Following the DASH diet can also help reduce your risk of heart disease by lowering your low-density lipoprotein (LDL, or "bad") cholesterol level.  Following the DASH diet may drop your blood pressure by a few points in as little as 2 weeks. However, " you should not stop taking your blood pressure medicine, or any other prescribed medicine, without talking to your doctor first.    What kinds of foods are included in the DASH diet?  The DASH diet is nutritionally balanced for good health. You dont need to buy any special foods or pills, or cook with any special recipes, to follow the DASH diet. If you follow the DASH diet, you will eat about 2,000 calories each day. These calories will come from a variety of foods.    Where is sodium in my diet?  Food Serving Sodium Content   ¼ teaspoon table salt 575 mg   ½ teaspoon table salt 1,150 mg   1 teaspoon table salt 2,300 mg   1 hot dog 460 mg   1 regular fast-food hamburger 600 mg   2 ounces processed cheese 600 mg   1 tablespoon soy sauce 900 mg   1 serving frozen pizza with meat and vegetables 982 mg   8 ounces regular potato chips 1,192 mg     The DASH diet   Whole grains (6 to 8 servings a day)    Vegetables (4 to 5 servings a day)    Fruits (4 to 5 servings a day)    Low-fat or fat-free milk and milk products (2 to 3 servings a day)    Lean meats, poultry and fish (6 or fewer servings a day)    Nuts, seeds and beans (4 to 5 servings a week)    Fats and oils (2 to 3 servings a day)    Sweets, preferably low-fat or fat-free (5 or fewer a week)    Sodium (no more than 2,300 mg a day)   You can adapt the DASH diet to meet your needs. For example:   If you drink alcohol, limit yourself to 2 drinks or less per day for men and 1 drink or less per day for women.    To reduce your blood pressure even more, replace some of the carbohydrates in the DASH diet with low-fat protein and unsaturated fats.    If you need to lose weight, reduce the number of calories you eat to about 1,600 per day.    Follow a lower-sodium version (no more than 1,500 mg daily) if you are 40 years of age or older, you are  or you already have high blood pressure.      How can I change my eating habits?  Dont be  "discouraged if following the DASH diet is difficult at first. Start with small, achievable goals. The following ideas can help you make healthy changes:   Pay attention to your current eating habits. Make a list of everything you eat for 2 or 3 days. Compare this list to the DASH diet recommendations above and see what changes you need to make in your diet.    Make one change at a time. For example, start by choosing lower-fat versions of your favorite foods or adding more whole grains to your meals.    Learn what makes a serving for each type of food. For example, 1 serving equals 1 slice of bread, 8 ounces of milk, 1 cup of raw vegetables or 1/2 cup of cooked vegetables. For more serving sizes, go to the [facility] site. Dont have a measuring cup? One serving (3 ounces) of meat or poultry is about the size of a deck of cards. One serving (1/2 cup) of rice or potato looks like half a baseball, and a serving of cheese is about the size of 4 stacked dice.    If eating more fruits and vegetables gives you gas, bloating or diarrhea, increase these foods slowly. You can also talk to your family doctor about taking over-the-counter medicines to reduce these symptoms until your body adjusts.    Get more exercise. Physical activity helps lower your blood pressure and can help you lose more weight.    Use salt-free seasonings, such as spices and herbs, to add flavor to your recipes and reduce or eliminate salt.    Include as many fresh and unprocessed foods as possible. Cut back on frozen dinners, packaged mixes, canned soups and bottled salad dressings, which are often high in sodium.    When buying canned, frozen or processed foods, check nutrition labels for the amount of sodium, sugar and saturated fat. Look for the phrases "no salt added," "sodium-free," "low sodium" or "very low sodium" on food packages. Choose foods with monounsaturated and polyunsaturated fats.    Steam, grill, poach, roast or stir-egan foods. " Use low-sodium broth or water instead of butter or oil for sautéing.    When you eat at a restaurant, ask how foods are prepared. Ask if your order can be made without added salt. Dont add salty condiments, such as ketchup, mustard, pickles or sauces, to your food.   Other Organizations   Centers for Disease Control and Prevention    National Heart, Lung, and Blood [place]   Written by [email or web address] editorial staff  Reviewed/Updated: 02/11  Created: 08/09

## 2020-12-10 NOTE — PROGRESS NOTES
(Portions of this note were dictated using voice recognition software and may contain dictation related errors in spelling/grammar/syntax not found on text review)    CC:   Annual    HPI: 75 y.o. male last visit September 20 20    Dyslipidemia on Crestor 20 mg daily    Elevated blood pressure,, have evaluated home readings as well in the past which true usually lower.  We discussed white coat hypertension and continue to monitor symptoms and blood pressure checks.  Has noticed however his blood pressure getting higher though at home., see below.  Working his diet and reducing sodium intake carbohydrate intake.  Typically is asymptomatic with this blood pressure elevation      IFG--> DM 2 by fasting sugar criteria:  Last labs as below, has been trying to work on his diet and reduce carbohydrate intake               Past Medical History:   Diagnosis Date    Arthritis     Diabetes mellitus, type II     fasting bg criteria    Hyperlipidemia        Past Surgical History:   Procedure Laterality Date    TONSILLECTOMY         Family History   Problem Relation Age of Onset    Cancer Mother         unknown, met to spine    Hypertension Mother     Osteoarthritis Mother     Deep vein thrombosis Father     Heart attack Father     Pulmonary embolism Father     Emphysema Father     Arthritis Sister     No Known Problems Daughter     No Known Problems Son     Fibromyalgia Sister     Breast cancer Maternal Aunt     Heart disease Neg Hx     Prostate cancer Neg Hx     Colon cancer Neg Hx     Diabetes Neg Hx        Social History     Socioeconomic History    Marital status:      Spouse name: Not on file    Number of children: Not on file    Years of education: Not on file    Highest education level: Not on file   Occupational History    Not on file   Social Needs    Financial resource strain: Not on file    Food insecurity     Worry: Not on file     Inability: Not on file    Transportation needs      Medical: Not on file     Non-medical: Not on file   Tobacco Use    Smoking status: Never Smoker    Smokeless tobacco: Never Used   Substance and Sexual Activity    Alcohol use: Yes     Comment: Occasionally    Drug use: No    Sexual activity: Yes     Partners: Female   Lifestyle    Physical activity     Days per week: Not on file     Minutes per session: Not on file    Stress: Not on file   Relationships    Social connections     Talks on phone: Not on file     Gets together: Not on file     Attends Buddhist service: Not on file     Active member of club or organization: Not on file     Attends meetings of clubs or organizations: Not on file     Relationship status: Not on file   Other Topics Concern    Not on file   Social History Narrative    Lives in [place] with his wife. He is retired from "[employer] He is originally from [place]. 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  2 daughters with marital and family problems. One of them is  and remarried because her  has a serious anger problems. His other son-in-law is in treatment for drug and alcohol problems. He has 5 grand children. He was in the [employer]. He goes to the gym. His son loves to hunt. He bought a small RV       Lab Results   Component Value Date    WBC 3.17 (L) 09/25/2020    HGB 15.4 09/25/2020    HCT 44.0 09/25/2020    MCV 90 09/25/2020     09/25/2020    CHOL 177 09/25/2020    TRIG 132 09/25/2020    HDL 38 (L) 09/25/2020    ALT 23 09/25/2020    AST 23 09/25/2020    BILITOT 0.6 09/25/2020    ALKPHOS 62 09/25/2020     09/25/2020    K 4.4 09/25/2020     09/25/2020    CREATININE 1.0 09/25/2020    ESTGFRAFRICA >60 09/25/2020    EGFRNONAA >60 09/25/2020    CALCIUM 9.7 09/25/2020    ALBUMIN 4.6 09/25/2020    BUN 15 09/25/2020    CO2 28 09/25/2020    TSH 1.112 09/25/2020    HGBA1C 6.0 (H) 09/25/2020    MICROALBUR 0.6 06/05/2019    MICALBCREAT 19.3 09/25/2020    LDLCALC 112.6 09/25/2020     (H) 09/25/2020                          Vital signs reviewed  PE:   APPEARANCE: Well nourished, well developed, in no acute distress.    HEAD: Normocephalic, atraumatic.  EYES:  Conjunctivae noninjected.       IMPRESSION  1. Type 2 diabetes mellitus without complication, without long-term current use of insulin    2. Hyperlipidemia, unspecified hyperlipidemia type    3. Benign essential HTN        PLAN  Orders Placed This Encounter   Procedures    CBC Auto Differential    Lipid Panel    Microalbumin/Creatinine Ratio, Urine    Hemoglobin A1C    Comprehensive Metabolic Panel     Hypertension: start losartan 25 mg daily. Lifestyle mod, DASH diet, sodium reduction. Continue monitoring    Diabetes:  Recheck labs    Continue regular exercise, healthy diet, sodium reduction.  Dash diet literature provided.       Orders Placed This Encounter   Procedures    CBC Auto Differential    Lipid Panel    Microalbumin/Creatinine Ratio, Urine    Hemoglobin A1C    Comprehensive Metabolic Panel         Send my chart message over the next couple of weeks with blood pressure readings.  If stable on current dosing, can see back in 3 months for his routine diabetes check.  If not quite optimally controlled, will see back in 1 month for reassessment and titration of meds.         SCREENINGS (males >=65)     Immunizations:  tdap:   March 2019  Zoster:  Can get at pharmacy  PCV 2016  PVX 2018  Declines flu vaccine     Hepatitis-C screening up-to-date 2016

## 2020-12-15 ENCOUNTER — PATIENT MESSAGE (OUTPATIENT)
Dept: FAMILY MEDICINE | Facility: CLINIC | Age: 75
End: 2020-12-15

## 2020-12-17 ENCOUNTER — VITALS (OUTPATIENT)
Dept: FAMILY MEDICINE | Facility: CLINIC | Age: 75
End: 2020-12-17

## 2020-12-17 VITALS — DIASTOLIC BLOOD PRESSURE: 89 MMHG | SYSTOLIC BLOOD PRESSURE: 133 MMHG

## 2021-01-06 ENCOUNTER — PATIENT MESSAGE (OUTPATIENT)
Dept: FAMILY MEDICINE | Facility: CLINIC | Age: 76
End: 2021-01-06

## 2021-01-07 RX ORDER — LOSARTAN POTASSIUM 50 MG/1
50 TABLET ORAL DAILY
Qty: 30 TABLET | Refills: 11 | Status: SHIPPED | OUTPATIENT
Start: 2021-01-07 | End: 2021-02-05 | Stop reason: SDUPTHER

## 2021-01-08 ENCOUNTER — PATIENT MESSAGE (OUTPATIENT)
Dept: FAMILY MEDICINE | Facility: CLINIC | Age: 76
End: 2021-01-08

## 2021-01-10 ENCOUNTER — IMMUNIZATION (OUTPATIENT)
Dept: FAMILY MEDICINE | Facility: CLINIC | Age: 76
End: 2021-01-10
Payer: MEDICARE

## 2021-01-10 DIAGNOSIS — Z23 NEED FOR VACCINATION: ICD-10-CM

## 2021-01-10 PROCEDURE — 91300 COVID-19, MRNA, LNP-S, PF, 30 MCG/0.3 ML DOSE VACCINE: CPT | Mod: PBBFAC | Performed by: FAMILY MEDICINE

## 2021-01-31 ENCOUNTER — IMMUNIZATION (OUTPATIENT)
Dept: FAMILY MEDICINE | Facility: CLINIC | Age: 76
End: 2021-01-31
Payer: MEDICARE

## 2021-01-31 DIAGNOSIS — Z23 NEED FOR VACCINATION: Primary | ICD-10-CM

## 2021-01-31 PROCEDURE — 91300 COVID-19, MRNA, LNP-S, PF, 30 MCG/0.3 ML DOSE VACCINE: CPT | Mod: PBBFAC | Performed by: FAMILY MEDICINE

## 2021-01-31 PROCEDURE — 0002A COVID-19, MRNA, LNP-S, PF, 30 MCG/0.3 ML DOSE VACCINE: CPT | Mod: PBBFAC | Performed by: FAMILY MEDICINE

## 2021-02-05 ENCOUNTER — PATIENT MESSAGE (OUTPATIENT)
Dept: FAMILY MEDICINE | Facility: CLINIC | Age: 76
End: 2021-02-05

## 2021-02-05 RX ORDER — LOSARTAN POTASSIUM 100 MG/1
100 TABLET ORAL DAILY
Qty: 30 TABLET | Refills: 11 | Status: SHIPPED | OUTPATIENT
Start: 2021-02-05 | End: 2022-02-21

## 2021-04-01 ENCOUNTER — PATIENT MESSAGE (OUTPATIENT)
Dept: ADMINISTRATIVE | Facility: HOSPITAL | Age: 76
End: 2021-04-01

## 2021-04-05 ENCOUNTER — PATIENT MESSAGE (OUTPATIENT)
Dept: FAMILY MEDICINE | Facility: CLINIC | Age: 76
End: 2021-04-05

## 2021-07-07 ENCOUNTER — PATIENT MESSAGE (OUTPATIENT)
Dept: ADMINISTRATIVE | Facility: HOSPITAL | Age: 76
End: 2021-07-07

## 2021-08-04 ENCOUNTER — PATIENT MESSAGE (OUTPATIENT)
Dept: ADMINISTRATIVE | Facility: HOSPITAL | Age: 76
End: 2021-08-04

## 2021-11-03 ENCOUNTER — PATIENT MESSAGE (OUTPATIENT)
Dept: ADMINISTRATIVE | Facility: HOSPITAL | Age: 76
End: 2021-11-03
Payer: MEDICARE

## 2022-01-04 ENCOUNTER — PES CALL (OUTPATIENT)
Dept: ADMINISTRATIVE | Facility: CLINIC | Age: 77
End: 2022-01-04
Payer: MEDICARE

## 2022-01-10 ENCOUNTER — PATIENT MESSAGE (OUTPATIENT)
Dept: ADMINISTRATIVE | Facility: HOSPITAL | Age: 77
End: 2022-01-10
Payer: MEDICARE

## 2022-02-15 ENCOUNTER — PATIENT MESSAGE (OUTPATIENT)
Dept: FAMILY MEDICINE | Facility: CLINIC | Age: 77
End: 2022-02-15
Payer: MEDICARE

## 2022-02-15 NOTE — TELEPHONE ENCOUNTER
Can try this Thursday if available at 11:30 a.m., otherwise if this is not good, could see another doctor to address urgent symptoms.  I have not seen him since December of 2020 so I would need to physically evaluate before being able to make further recommendations.

## 2022-02-16 ENCOUNTER — OFFICE VISIT (OUTPATIENT)
Dept: FAMILY MEDICINE | Facility: CLINIC | Age: 77
End: 2022-02-16
Payer: MEDICARE

## 2022-02-16 VITALS
WEIGHT: 204.13 LBS | BODY MASS INDEX: 27.05 KG/M2 | SYSTOLIC BLOOD PRESSURE: 108 MMHG | TEMPERATURE: 99 F | HEIGHT: 73 IN | DIASTOLIC BLOOD PRESSURE: 58 MMHG

## 2022-02-16 DIAGNOSIS — I10 HYPERTENSION, UNSPECIFIED TYPE: ICD-10-CM

## 2022-02-16 DIAGNOSIS — Z13.6 ENCOUNTER FOR LIPID SCREENING FOR CARDIOVASCULAR DISEASE: ICD-10-CM

## 2022-02-16 DIAGNOSIS — Z13.220 ENCOUNTER FOR LIPID SCREENING FOR CARDIOVASCULAR DISEASE: ICD-10-CM

## 2022-02-16 DIAGNOSIS — R50.9 LOW GRADE FEVER: Primary | ICD-10-CM

## 2022-02-16 DIAGNOSIS — R73.03 PREDIABETES: ICD-10-CM

## 2022-02-16 LAB
CTP QC/QA: YES
FLUAV AG NPH QL: NEGATIVE
FLUBV AG NPH QL: NEGATIVE

## 2022-02-16 PROCEDURE — 1101F PR PT FALLS ASSESS DOC 0-1 FALLS W/OUT INJ PAST YR: ICD-10-PCS | Mod: HCNC,CPTII,S$GLB, | Performed by: FAMILY MEDICINE

## 2022-02-16 PROCEDURE — 3074F SYST BP LT 130 MM HG: CPT | Mod: HCNC,CPTII,S$GLB, | Performed by: FAMILY MEDICINE

## 2022-02-16 PROCEDURE — 1126F AMNT PAIN NOTED NONE PRSNT: CPT | Mod: HCNC,CPTII,S$GLB, | Performed by: FAMILY MEDICINE

## 2022-02-16 PROCEDURE — 1126F PR PAIN SEVERITY QUANTIFIED, NO PAIN PRESENT: ICD-10-PCS | Mod: HCNC,CPTII,S$GLB, | Performed by: FAMILY MEDICINE

## 2022-02-16 PROCEDURE — 3288F PR FALLS RISK ASSESSMENT DOCUMENTED: ICD-10-PCS | Mod: HCNC,CPTII,S$GLB, | Performed by: FAMILY MEDICINE

## 2022-02-16 PROCEDURE — 3078F DIAST BP <80 MM HG: CPT | Mod: HCNC,CPTII,S$GLB, | Performed by: FAMILY MEDICINE

## 2022-02-16 PROCEDURE — 1159F PR MEDICATION LIST DOCUMENTED IN MEDICAL RECORD: ICD-10-PCS | Mod: HCNC,CPTII,S$GLB, | Performed by: FAMILY MEDICINE

## 2022-02-16 PROCEDURE — 99999 PR PBB SHADOW E&M-EST. PATIENT-LVL III: ICD-10-PCS | Mod: PBBFAC,,, | Performed by: FAMILY MEDICINE

## 2022-02-16 PROCEDURE — 3078F PR MOST RECENT DIASTOLIC BLOOD PRESSURE < 80 MM HG: ICD-10-PCS | Mod: HCNC,CPTII,S$GLB, | Performed by: FAMILY MEDICINE

## 2022-02-16 PROCEDURE — 99999 PR PBB SHADOW E&M-EST. PATIENT-LVL III: CPT | Mod: PBBFAC,,, | Performed by: FAMILY MEDICINE

## 2022-02-16 PROCEDURE — 87804 INFLUENZA ASSAY W/OPTIC: CPT | Mod: QW,HCNC,S$GLB, | Performed by: FAMILY MEDICINE

## 2022-02-16 PROCEDURE — 99214 PR OFFICE/OUTPT VISIT, EST, LEVL IV, 30-39 MIN: ICD-10-PCS | Mod: HCNC,S$GLB,, | Performed by: FAMILY MEDICINE

## 2022-02-16 PROCEDURE — 99214 OFFICE O/P EST MOD 30 MIN: CPT | Mod: HCNC,S$GLB,, | Performed by: FAMILY MEDICINE

## 2022-02-16 PROCEDURE — 87804 POCT INFLUENZA A/B: ICD-10-PCS | Mod: QW,HCNC,S$GLB, | Performed by: FAMILY MEDICINE

## 2022-02-16 PROCEDURE — 1101F PT FALLS ASSESS-DOCD LE1/YR: CPT | Mod: HCNC,CPTII,S$GLB, | Performed by: FAMILY MEDICINE

## 2022-02-16 PROCEDURE — 3074F PR MOST RECENT SYSTOLIC BLOOD PRESSURE < 130 MM HG: ICD-10-PCS | Mod: HCNC,CPTII,S$GLB, | Performed by: FAMILY MEDICINE

## 2022-02-16 PROCEDURE — 1159F MED LIST DOCD IN RCRD: CPT | Mod: HCNC,CPTII,S$GLB, | Performed by: FAMILY MEDICINE

## 2022-02-16 PROCEDURE — 3288F FALL RISK ASSESSMENT DOCD: CPT | Mod: HCNC,CPTII,S$GLB, | Performed by: FAMILY MEDICINE

## 2022-02-16 NOTE — PROGRESS NOTES
(Portions of this note were dictated using voice recognition software and may contain dictation related errors in spelling/grammar/syntax not found on text review)    CC:   Chief Complaint   Patient presents with    Fever       HPI: 76 y.o. male, pt of Dr Lakhani, presented with fever. He is new to me. He has medical history of hypertension, hyperlipidemia. He states 2 weeks ago he started to have upper respiratory symptoms with cough, sore throat, hoarseness, nasal congestion and pressure, he thought he had Covid and went to urgent care at [facility], he was tested negative for Covid. He has been using OTC medications for symptomatic relief and think symptoms are improving, however, he started to have feverish feeling and malaise last week, he checked his temp and had low grade fever with t max of 100.3 at home, he has been checking daily and found low grade fever, he became concerned about it an came for evaluation. He states cough is improving, he has night sweats yesterday, other upper respiratory symptoms are gone. He has pre diabetes and also needs labs done. He denies chills, shortness of breath, chest pain, N/V, abdominal pain,changes in bowel habits, urine problems, denies burning or dyurea. He is not vaccinated with flu, vaccinated with Covid.     Past Medical History:   Diagnosis Date    Arthritis     Diabetes mellitus, type II     fasting bg criteria    Hyperlipidemia        Past Surgical History:   Procedure Laterality Date    TONSILLECTOMY         Family History   Problem Relation Age of Onset    Cancer Mother         unknown, met to spine    Hypertension Mother     Osteoarthritis Mother     Deep vein thrombosis Father     Heart attack Father     Pulmonary embolism Father     Emphysema Father     Arthritis Sister     No Known Problems Daughter     No Known Problems Son     Fibromyalgia Sister     Breast cancer Maternal Aunt     Heart disease Neg Hx     Prostate cancer Neg Hx     Colon  cancer Neg Hx     Diabetes Neg Hx        Social History     Tobacco Use    Smoking status: Never Smoker    Smokeless tobacco: Never Used   Substance Use Topics    Alcohol use: Yes     Comment: Occasionally    Drug use: No       Lab Results   Component Value Date    WBC 3.76 (L) 12/10/2020    HGB 15.5 12/10/2020    HCT 43.8 12/10/2020    MCV 91 12/10/2020     12/10/2020    CHOL 154 12/10/2020    TRIG 122 12/10/2020    HDL 38 (L) 12/10/2020    ALT 22 12/10/2020    AST 24 12/10/2020    BILITOT 0.7 12/10/2020    ALKPHOS 61 12/10/2020     12/10/2020    K 4.5 12/10/2020     12/10/2020    CREATININE 1.0 12/10/2020    ESTGFRAFRICA >60 12/10/2020    EGFRNONAA >60 12/10/2020    CALCIUM 9.5 12/10/2020    ALBUMIN 4.5 12/10/2020    BUN 17 12/10/2020    CO2 30 (H) 12/10/2020    TSH 1.112 09/25/2020    HGBA1C 6.0 (H) 12/10/2020    MICROALBUR 0.6 06/05/2019    MICALBCREAT 11.1 12/10/2020    LDLCALC 91.6 12/10/2020     (H) 12/10/2020             Vital signs reviewed  PE:   APPEARANCE: Well nourished, well developed, in no acute distress.    HEAD: Normocephalic, atraumatic.  EYES: EOMI.  Conjunctivae noninjected.  NOSE: Mucosa pink. Airway clear.  MOUTH & THROAT: No tonsillar enlargement. No pharyngeal erythema or exudate.   NECK: Supple with no cervical lymphadenopathy.    CHEST: Good inspiratory effort. Lungs clear to auscultation with no wheezes or crackles.  CARDIOVASCULAR: Normal S1, S2. No rubs, murmurs, or gallops.  ABDOMEN: Bowel sounds normal. Not distended. Soft. No tenderness or masses. No organomegaly.  EXTREMITIES: No edema, cyanosis, or clubbing.    Review of Systems   Constitutional: Negative for chills, fatigue and fever.   HENT: Negative.    Respiratory: Negative for cough, wheezing and stridor.    Cardiovascular: Negative for chest pain, palpitations and leg swelling.   Gastrointestinal: Negative for abdominal pain, change in bowel habit, constipation, diarrhea, nausea, vomiting and  change in bowel habit.   Genitourinary: Negative.    Musculoskeletal: Negative.    Neurological: Negative.    Psychiatric/Behavioral: Negative.    All other systems reviewed and are negative.      IMPRESSION  1. Low grade fever    2. Prediabetes    3. Encounter for lipid screening for cardiovascular disease    4. Hypertension, unspecified type          PLAN      1. Low grade fever  Covid neg 2 weeks ago  Seems like related to upper respiratory syndrome he had, he is due for labs, will update them  - CBC Auto Differential; Future  - Comprehensive Metabolic Panel; Future  - POCT Influenza A/B- negative  - tylenol/ibuprofen as needed      2. Prediabetes  - Hemoglobin A1C; Future  - MICROALBUMIN / CREATININE RATIO URINE; Future      3. Encounter for lipid screening for cardiovascular disease  - Lipid Panel; Future        4. Hypertension, unspecified type  Stable  Continue losartan with holding parameters   advised to monitor BP at home      RTC if symptoms worsen or does not improve      John Paul Osorio   2/16/2022

## 2022-02-18 ENCOUNTER — TELEPHONE (OUTPATIENT)
Dept: FAMILY MEDICINE | Facility: CLINIC | Age: 77
End: 2022-02-18
Payer: MEDICARE

## 2022-02-18 ENCOUNTER — PATIENT MESSAGE (OUTPATIENT)
Dept: FAMILY MEDICINE | Facility: CLINIC | Age: 77
End: 2022-02-18
Payer: MEDICARE

## 2022-02-18 DIAGNOSIS — R74.01 TRANSAMINITIS: Primary | ICD-10-CM

## 2022-02-19 NOTE — TELEPHONE ENCOUNTER
Care Due:                  Date            Visit Type   Department     Provider  --------------------------------------------------------------------------------                                EP -                              PRIMARY      [facility]  Last Visit: 12-      CARE ([facility])   MEDICINE       Marc Lakhani  Next Visit: None Scheduled  None         None Found                                                            Last  Test          Frequency    Reason                     Performed    Due Date  --------------------------------------------------------------------------------    Office Visit  12 months..  losartan, rosuvastatin...  12-   12-    Powered by [employer]. Reference number: [id number].   2/19/2022 12:39:44 PM CST

## 2022-02-20 ENCOUNTER — PATIENT MESSAGE (OUTPATIENT)
Dept: FAMILY MEDICINE | Facility: CLINIC | Age: 77
End: 2022-02-20
Payer: MEDICARE

## 2022-02-21 DIAGNOSIS — E11.65 TYPE 2 DIABETES MELLITUS WITH HYPERGLYCEMIA, WITHOUT LONG-TERM CURRENT USE OF INSULIN: Primary | ICD-10-CM

## 2022-02-21 RX ORDER — LOSARTAN POTASSIUM 100 MG/1
TABLET ORAL
Qty: 90 TABLET | Refills: 0 | Status: SHIPPED | OUTPATIENT
Start: 2022-02-21 | End: 2022-05-20

## 2022-02-21 RX ORDER — METFORMIN HYDROCHLORIDE 500 MG/1
500 TABLET, EXTENDED RELEASE ORAL
Qty: 90 TABLET | Refills: 0 | Status: SHIPPED | OUTPATIENT
Start: 2022-02-21 | End: 2022-06-10

## 2022-02-21 NOTE — TELEPHONE ENCOUNTER
Refill Authorization Note   Joi Castellanos  is requesting a refill authorization.  Brief Assessment and Rationale for Refill:  Approve     Medication Therapy Plan:       Medication Reconciliation Completed: No   Comments:   --->Care Gap information included below if applicable.   Orders Placed This Encounter    losartan (COZAAR) 100 MG tablet      Requested Prescriptions   Signed Prescriptions Disp Refills    losartan (COZAAR) 100 MG tablet 90 tablet 0     Sig: TAKE 1 TABLET BY MOUTH EVERY DAY       Cardiovascular:  Angiotensin Receptor Blockers Passed - 2/21/2022  2:46 PM        Passed - Patient is at least 18 years old        Passed - Last BP in normal range within 360 days     BP Readings from Last 1 Encounters:   02/16/22 (!) 108/58               Passed - Valid encounter within last 15 months     Recent Visits  Date Type Provider Dept   12/10/20 Office Visit Marc Lakhani MD [facility]   09/25/20 Office Visit Marc Lakhani MD [facility]   Showing recent visits within past 720 days and meeting all other requirements  Future Appointments  No visits were found meeting these conditions.  Showing future appointments within next 150 days and meeting all other requirements      Future Appointments              Tomorrow Marc Lakhani MD [facility]                Passed - Cr is 1.39 or below and within 360 days     Lab Results   Component Value Date    CREATININE 0.83 02/18/2022    CREATININE 1.0 12/10/2020    CREATININE 1.0 09/25/2020              Passed - K is 5.2 or below and within 360 days     Potassium   Date Value Ref Range Status   02/18/2022 4.1 3.5 - 5.1 mmol/L Final   12/10/2020 4.5 3.5 - 5.1 mmol/L Final   09/25/2020 4.4 3.5 - 5.1 mmol/L Final              Passed - eGFR within 360 days     Lab Results   Component Value Date    EGFRNONAA >60.0 02/18/2022    EGFRNONAA >60 12/10/2020    EGFRNONAA >60 09/25/2020                    Appointments   past 12m or future 3m with PCP    Date Provider   Last Visit   12/10/2020 Marc Lakhani MD   Next Visit   2/22/2022 Marc Lakhani MD   ED visits in past 90 days: 0     Note composed:2:48 PM 02/21/2022

## 2022-02-22 ENCOUNTER — TELEPHONE (OUTPATIENT)
Dept: FAMILY MEDICINE | Facility: CLINIC | Age: 77
End: 2022-02-22
Payer: MEDICARE

## 2022-02-22 ENCOUNTER — HOSPITAL ENCOUNTER (OUTPATIENT)
Dept: RADIOLOGY | Facility: HOSPITAL | Age: 77
Discharge: HOME | End: 2022-02-22
Attending: FAMILY MEDICINE
Payer: MEDICARE

## 2022-02-22 ENCOUNTER — PATIENT MESSAGE (OUTPATIENT)
Dept: FAMILY MEDICINE | Facility: CLINIC | Age: 77
End: 2022-02-22

## 2022-02-22 ENCOUNTER — OFFICE VISIT (OUTPATIENT)
Dept: FAMILY MEDICINE | Facility: CLINIC | Age: 77
End: 2022-02-22
Payer: MEDICARE

## 2022-02-22 VITALS
WEIGHT: 194 LBS | OXYGEN SATURATION: 98 % | SYSTOLIC BLOOD PRESSURE: 130 MMHG | DIASTOLIC BLOOD PRESSURE: 56 MMHG | HEART RATE: 100 BPM | TEMPERATURE: 100 F | BODY MASS INDEX: 25.71 KG/M2 | HEIGHT: 73 IN

## 2022-02-22 DIAGNOSIS — D64.9 ANEMIA, UNSPECIFIED TYPE: ICD-10-CM

## 2022-02-22 DIAGNOSIS — J06.9 UPPER RESPIRATORY TRACT INFECTION, UNSPECIFIED TYPE: ICD-10-CM

## 2022-02-22 DIAGNOSIS — R50.9 FEVER, UNSPECIFIED FEVER CAUSE: ICD-10-CM

## 2022-02-22 DIAGNOSIS — R74.01 TRANSAMINITIS: ICD-10-CM

## 2022-02-22 DIAGNOSIS — R50.9 FEVER, UNSPECIFIED FEVER CAUSE: Primary | ICD-10-CM

## 2022-02-22 DIAGNOSIS — R50.9 FEVER: ICD-10-CM

## 2022-02-22 DIAGNOSIS — Z79.899 OTHER LONG TERM (CURRENT) DRUG THERAPY: ICD-10-CM

## 2022-02-22 LAB — SARS-COV-2 RNA RESP QL NAA+PROBE: NEGATIVE

## 2022-02-22 PROCEDURE — 76705 ECHO EXAM OF ABDOMEN: CPT | Mod: 26,HCNC,, | Performed by: RADIOLOGY

## 2022-02-22 PROCEDURE — 3075F SYST BP GE 130 - 139MM HG: CPT | Mod: HCNC,CPTII,S$GLB, | Performed by: FAMILY MEDICINE

## 2022-02-22 PROCEDURE — 99999 PR PBB SHADOW E&M-EST. PATIENT-LVL IV: CPT | Mod: PBBFAC,HCNC,, | Performed by: FAMILY MEDICINE

## 2022-02-22 PROCEDURE — 1159F MED LIST DOCD IN RCRD: CPT | Mod: HCNC,CPTII,S$GLB, | Performed by: FAMILY MEDICINE

## 2022-02-22 PROCEDURE — 71046 X-RAY EXAM CHEST 2 VIEWS: CPT | Mod: 26,HCNC,, | Performed by: RADIOLOGY

## 2022-02-22 PROCEDURE — 1101F PT FALLS ASSESS-DOCD LE1/YR: CPT | Mod: HCNC,CPTII,S$GLB, | Performed by: FAMILY MEDICINE

## 2022-02-22 PROCEDURE — 3078F DIAST BP <80 MM HG: CPT | Mod: HCNC,CPTII,S$GLB, | Performed by: FAMILY MEDICINE

## 2022-02-22 PROCEDURE — 1160F RVW MEDS BY RX/DR IN RCRD: CPT | Mod: HCNC,CPTII,S$GLB, | Performed by: FAMILY MEDICINE

## 2022-02-22 PROCEDURE — 3044F PR MOST RECENT HEMOGLOBIN A1C LEVEL <7.0%: ICD-10-PCS | Mod: HCNC,CPTII,S$GLB, | Performed by: FAMILY MEDICINE

## 2022-02-22 PROCEDURE — 3288F FALL RISK ASSESSMENT DOCD: CPT | Mod: HCNC,CPTII,S$GLB, | Performed by: FAMILY MEDICINE

## 2022-02-22 PROCEDURE — 3075F PR MOST RECENT SYSTOLIC BLOOD PRESS GE 130-139MM HG: ICD-10-PCS | Mod: HCNC,CPTII,S$GLB, | Performed by: FAMILY MEDICINE

## 2022-02-22 PROCEDURE — 76705 US ABDOMEN LIMITED: ICD-10-PCS | Mod: 26,HCNC,, | Performed by: RADIOLOGY

## 2022-02-22 PROCEDURE — 1126F AMNT PAIN NOTED NONE PRSNT: CPT | Mod: HCNC,CPTII,S$GLB, | Performed by: FAMILY MEDICINE

## 2022-02-22 PROCEDURE — 99215 OFFICE O/P EST HI 40 MIN: CPT | Mod: 25,HCNC,S$GLB, | Performed by: FAMILY MEDICINE

## 2022-02-22 PROCEDURE — 71046 XR CHEST PA AND LATERAL: ICD-10-PCS | Mod: 26,HCNC,, | Performed by: RADIOLOGY

## 2022-02-22 PROCEDURE — 3288F PR FALLS RISK ASSESSMENT DOCUMENTED: ICD-10-PCS | Mod: HCNC,CPTII,S$GLB, | Performed by: FAMILY MEDICINE

## 2022-02-22 PROCEDURE — 71046 X-RAY EXAM CHEST 2 VIEWS: CPT | Mod: TC,HCNC,FY

## 2022-02-22 PROCEDURE — 1159F PR MEDICATION LIST DOCUMENTED IN MEDICAL RECORD: ICD-10-PCS | Mod: HCNC,CPTII,S$GLB, | Performed by: FAMILY MEDICINE

## 2022-02-22 PROCEDURE — 3044F HG A1C LEVEL LT 7.0%: CPT | Mod: HCNC,CPTII,S$GLB, | Performed by: FAMILY MEDICINE

## 2022-02-22 PROCEDURE — 3078F PR MOST RECENT DIASTOLIC BLOOD PRESSURE < 80 MM HG: ICD-10-PCS | Mod: HCNC,CPTII,S$GLB, | Performed by: FAMILY MEDICINE

## 2022-02-22 PROCEDURE — 1101F PR PT FALLS ASSESS DOC 0-1 FALLS W/OUT INJ PAST YR: ICD-10-PCS | Mod: HCNC,CPTII,S$GLB, | Performed by: FAMILY MEDICINE

## 2022-02-22 PROCEDURE — 99999 PR PBB SHADOW E&M-EST. PATIENT-LVL IV: ICD-10-PCS | Mod: PBBFAC,HCNC,, | Performed by: FAMILY MEDICINE

## 2022-02-22 PROCEDURE — 1126F PR PAIN SEVERITY QUANTIFIED, NO PAIN PRESENT: ICD-10-PCS | Mod: HCNC,CPTII,S$GLB, | Performed by: FAMILY MEDICINE

## 2022-02-22 PROCEDURE — 1160F PR REVIEW ALL MEDS BY PRESCRIBER/CLIN PHARMACIST DOCUMENTED: ICD-10-PCS | Mod: HCNC,CPTII,S$GLB, | Performed by: FAMILY MEDICINE

## 2022-02-22 PROCEDURE — 76705 ECHO EXAM OF ABDOMEN: CPT | Mod: TC,HCNC

## 2022-02-22 PROCEDURE — 99215 PR OFFICE/OUTPT VISIT, EST, LEVL V, 40-54 MIN: ICD-10-PCS | Mod: 25,HCNC,S$GLB, | Performed by: FAMILY MEDICINE

## 2022-02-22 RX ORDER — LANOLIN ALCOHOL/MO/W.PET/CERES
1 CREAM (GRAM) TOPICAL
COMMUNITY
End: 2022-06-07

## 2022-02-22 NOTE — PROGRESS NOTES
(Portions of this note were dictated using voice recognition software and may contain dictation related errors in spelling/grammar/syntax not found on text review)    CC:  Fever, abnormal labs    HPI: 76 y.o. male Last seen by me December 2020  Early February about 3 weeks ago, was having a lot of congestion in his throat and coughing, difficulty talking.  Went to [facility] urgent care on 02/07/2022 and was tested for COVID was negative.  Was advised to take Zyrtec.  States dates that congestion gotten better but then started on 02/15/2022 with low-grade temperature 100.3°.  On 02/16/2022, saw Dr. Osorio here in office.  Note reviewed.  Had stated upper respiratory symptoms had resolved but was continuing to feel feverish in malaise.  Had night sweats stay before.  Had not had his flu vaccine so flu test was done in the office and was negative.  Temperature was 99° F in the office.  BP was 108/58.  Oxygen saturation was 99% on room air.  Physical examination reportedly normal otherwise.  Advised on Tylenol or ibuprofen.  Labs were done which demonstrated A1c of 6.5, transaminitis with AST of 76, ALT of 152, alkaline phosphatase of 291.  Also depressed hemoglobin of 10.5 (was 15.5 in December 2020).  Follow-up abdominal ultrasound and hepatitis panel have been ordered    Current symptoms:  Still having low-grade temperature in malaise, today is at 100.2.  Denies any headaches.  Did state that last night he started back with some postnasal drip, ear fullness, coughing at night.  Denies any nausea, vomiting, chest pain, shortness of breath, abdominal pain, constipation, diarrhea.  Does state for the 1st week or so when he got on Zyrtec above it made him feel worse.  He states for few days he was not really much able to drink much at all because every time he did he would start coughing.  Now that is better and he is able to eat and drink normally.  He did state that initially he had a lot of soreness in the anterior  neck whenever he touched.  This has improved.  He is currently not on any Zyrtec.  Took Flonase x1 dose.  He states that he had a rash on both ankles at 1 point a week or so ago but applied some hydrocortisone cream and this got better. he states that he started taking iron when he found out about the anemia.  Since then his stool is a bit dark but denies any black stool or blood in the stool prior to taking the iron    Since stopping the Zyrtec, he has to take an occasional NSAID or Tylenol but has not been overusing.    He did have a colonoscopy many years ago, and I swore I would never do that again.  He took a fit test in 2020 that was normal.          Past Medical History:   Diagnosis Date    Arthritis     Diabetes mellitus, type II     fasting bg criteria    Hyperlipidemia        Past Surgical History:   Procedure Laterality Date    TONSILLECTOMY         Family History   Problem Relation Age of Onset    Cancer Mother         unknown, met to spine    Hypertension Mother     Osteoarthritis Mother     Deep vein thrombosis Father     Heart attack Father     Pulmonary embolism Father     Emphysema Father     Arthritis Sister     No Known Problems Daughter     No Known Problems Son     Fibromyalgia Sister     Breast cancer Maternal Aunt     Heart disease Neg Hx     Prostate cancer Neg Hx     Colon cancer Neg Hx     Diabetes Neg Hx        Social History     Tobacco Use    Smoking status: Never Smoker    Smokeless tobacco: Never Used   Substance Use Topics    Alcohol use: Yes     Comment: Occasionally    Drug use: No       Lab Results   Component Value Date    WBC 5.93 02/18/2022    HGB 10.5 (L) 02/18/2022    HCT 31.1 (L) 02/18/2022    MCV 93 02/18/2022     02/18/2022    CHOL 128 02/18/2022    TRIG 89 02/18/2022    HDL 21 (L) 02/18/2022     (H) 02/18/2022    AST 76 (H) 02/18/2022    BILITOT 0.7 02/18/2022    ALKPHOS 291 (H) 02/18/2022     (L) 02/18/2022    K 4.1 02/18/2022  "   CL 93 (L) 02/18/2022    CREATININE 0.83 02/18/2022    ESTGFRAFRICA >60.0 02/18/2022    EGFRNONAA >60.0 02/18/2022    CALCIUM 8.9 02/18/2022    ALBUMIN 3.7 02/18/2022    BUN 11 02/18/2022    CO2 27 02/18/2022    TSH 1.112 09/25/2020    HGBA1C 6.6 (H) 02/18/2022    MICROALBUR 0.6 06/05/2019    MICALBCREAT Unable to calculate 02/18/2022    LDLCALC 89.2 02/18/2022     (H) 02/18/2022             Vital signs reviewed  Vitals:    02/22/22 1005   BP: (!) 130/56   BP Location: Right arm   Patient Position: Sitting   BP Method: Medium (Manual)   Pulse: 100   Temp: 100.2 °F (37.9 °C)   TempSrc: Oral   SpO2: 98%   Weight: 88 kg (194 lb 0.1 oz)   Height: 6' 1" (1.854 m)       Wt Readings from Last 4 Encounters:   02/16/22 92.6 kg (204 lb 2.3 oz)   12/10/20 92.3 kg (203 lb 7.8 oz)   09/25/20 90.4 kg (199 lb 4.7 oz)   06/11/19 96.9 kg (213 lb 10 oz)       PE:   APPEARANCE: Well nourished, well developed, in no acute distress.    HEAD: Normocephalic, atraumatic.  No sinus tenderness to palpation  EYES: PERRL. EOMI.   Conjunctivae noninjected.  EARS: TM's intact. Light reflex normal. No retraction or perforation  NOSE:  Nasal turbinate edema bilaterally  MOUTH & THROAT: No tonsillar enlargement.  Mild pharyngeal erythema with cobblestoning noted.  No exudate   NECK: Supple with no cervical lymphadenopathy.    CHEST: Good inspiratory effort.  There are some bibasilar crackles noted.  No wheezing.  CARDIOVASCULAR: Normal S1, S2. No rubs, murmurs, or gallops.  ABDOMEN: Bowel sounds normal. Not distended. Soft. No tenderness or masses. No organomegaly.  Negative Eduardo sign.  EXTREMITIES: No edema no current lower extremity rash or truncal or back rash noted.  No facial rash.      IMPRESSION  1. Fever, unspecified fever cause    2. Transaminitis    3. Anemia, unspecified type    4. Upper respiratory tract infection, unspecified type    5. Other long term (current) drug therapy             PLAN  Orders Placed This Encounter "   Procedures    Urine culture    X-Ray Chest PA And Lateral    CBC Auto Differential    Iron and TIBC    Ferritin    Folate    Vitamin B12    Reticulocytes    Hepatitis Panel, Acute    Comprehensive Metabolic Panel    Urinalysis    COVID-19 Routine Screening    HETEROPHILE AB SCREEN       Spoke to Dr. Osorio about his recent visit and workup.  Reviewed chart notes and labs as above.     Prolonged fever with initial upper respiratory symptoms which have now largely improved.  Coexisting lab abnormalities including elevated AST ALT and alk-phos, normal bilirubin, along with depressed hemoglobin.  Will workup with repeat lab testing above, chest x-ray, urine testing, hepatitis panel, Monospot. Also will retest for COVID given some persistence of symptoms along with prior upper respiratory issues.  His initial COVID test negative at urgent care was early in the course of his illness, and unsure type of test whether was antigen test or PCR, as we discussed many times antigen tests can be falsely negative early in the course.    Rapid COVID NAAT :  negative        SCREENINGS (males >=65)     Immunizations:  tdap:   March 2019  Zoster:  Can get at pharmacy  PCV 2016  PVX 2018  Declines flu vaccine  COVID:  01/10/2021, 01/31/2021    Hepatitis-C screening up-to-date 2016     Has not had a colonoscopy in many years

## 2022-02-22 NOTE — TELEPHONE ENCOUNTER
Just a note that I reviewed recent labs and x-ray.  Chest x-ray was normal without any sign of pneumonia.      Preliminary urine test is normal but I will let you know when the culture comes back.    Blood test showed liver tests are still elevated but have not drastically worsened  1 of the tests is very slightly up but the other 2 are slightly down.  Your mono test was negative.  I am waiting on the viral hepatitis panel to come back (checks for hepatitis-A, B, C).  Your blood count is still low but it is a little bit up from last time.  I am still waiting on iron and other vitamin levels regarding the blood counts and I will let you know soon as these come back in    I will let you know when I get the liver ultrasound results back.    I do recommend that we need to update these labs at least in the next month if not sooner if your symptoms are continuing or worsening.  I think taking occasional ibuprofen or Aleve or Tylenol on an as-needed basis is fine to treat the  low-grade temperature if you need    If all is stable including ultrasound, remainder of  lab testing, and urine culture, but you are still having some low-grade temperature elevation and respiratory symptoms like postnasal drip or coughing or sinus issues, I may  try to treat in case you have a sinus infection just to see if this helps.  If you develop any other symptoms at all including chest pain, shortness of breath, nausea, vomiting, abdominal pain, body aches, or anything else with any continued fever, please let me know in case we need to do a quicker re-evaluation.    Marc Lakhani MD

## 2022-02-23 ENCOUNTER — PATIENT MESSAGE (OUTPATIENT)
Dept: FAMILY MEDICINE | Facility: CLINIC | Age: 77
End: 2022-02-23
Payer: MEDICARE

## 2022-02-25 ENCOUNTER — PATIENT MESSAGE (OUTPATIENT)
Dept: FAMILY MEDICINE | Facility: CLINIC | Age: 77
End: 2022-02-25
Payer: MEDICARE

## 2022-02-25 ENCOUNTER — TELEPHONE (OUTPATIENT)
Dept: FAMILY MEDICINE | Facility: CLINIC | Age: 77
End: 2022-02-25
Payer: MEDICARE

## 2022-02-25 DIAGNOSIS — R74.01 TRANSAMINITIS: Primary | ICD-10-CM

## 2022-02-25 DIAGNOSIS — D64.9 ANEMIA, UNSPECIFIED TYPE: ICD-10-CM

## 2022-02-25 NOTE — TELEPHONE ENCOUNTER
I had sent a portal message recently, do recommend rechecking labs below in the next 3 weeks.  He is feeling a little bit better, temperature is coming down so I think we can hold off any urgent labs for any antibiotics for any presumed sinus infection issues as prior discussed with him    Orders Placed This Encounter   Procedures    CBC Auto Differential    Comprehensive Metabolic Panel

## 2022-02-28 ENCOUNTER — PATIENT MESSAGE (OUTPATIENT)
Dept: FAMILY MEDICINE | Facility: CLINIC | Age: 77
End: 2022-02-28
Payer: MEDICARE

## 2022-03-10 ENCOUNTER — PATIENT MESSAGE (OUTPATIENT)
Dept: FAMILY MEDICINE | Facility: CLINIC | Age: 77
End: 2022-03-10
Payer: MEDICARE

## 2022-03-11 NOTE — TELEPHONE ENCOUNTER
It could be possible the metformin could be involved although it is not  a very common issue.  Nonetheless looking through your labs, your diabetes numbers were not significantly worrisome enough to where we necessarily have to do the metformin right now.  If you would like to hold off on this medication, as long as everything is stable, we could just recheck your labs in the next 3 months or so.  If getting off the metformin, your appetite seems to be better, will just further follow-up labs and if needed readdress medication at a future time based on any worsening diabetes control.     Marc Lakhani MD      This Patient Portal message has not been read.     Joi Castellanos  You Yesterday (10:27 AM)           Cough seems to be under control at last. Found Coricidin cough & cold, small dose antihistamine (4mg six hr. dose) also 30 mg cough suppressant. Took two nights. Used Halls cough drops during day. Drip has cleared up for the most part. I have not taken any antihistamine or cough drops since Sunday.      Problem is still weight loss and loss of appetite. Weight loss seems to have bottomed out at 180 lb. Appetite still not back to normal but is slightly better.  Can the Metformin be causing the loss of appetite? Also, I have been told that I can take a supplement such as Ensure to help control my weight loss but looking on the bottle It contains 15 gm of sugar.  Is this ok?

## 2022-03-13 ENCOUNTER — PATIENT MESSAGE (OUTPATIENT)
Dept: FAMILY MEDICINE | Facility: CLINIC | Age: 77
End: 2022-03-13
Payer: MEDICARE

## 2022-03-17 ENCOUNTER — PATIENT MESSAGE (OUTPATIENT)
Dept: FAMILY MEDICINE | Facility: CLINIC | Age: 77
End: 2022-03-17
Payer: MEDICARE

## 2022-03-17 DIAGNOSIS — D53.9 NUTRITIONAL ANEMIA, UNSPECIFIED: ICD-10-CM

## 2022-03-17 DIAGNOSIS — D64.9 ANEMIA, UNSPECIFIED TYPE: Primary | ICD-10-CM

## 2022-03-18 NOTE — TELEPHONE ENCOUNTER
Portal message sent, can check labs below in 2 months to f/u on anemia    Orders Placed This Encounter   Procedures    CBC Auto Differential    Iron and TIBC    Ferritin    Folate    Vitamin B12    Reticulocytes

## 2022-05-19 NOTE — TELEPHONE ENCOUNTER
No new care gaps identified.  [facility] Embedded Care Gaps. Reference number: [id number]. 5/19/2022   12:18:52 AM JINNYT

## 2022-05-20 RX ORDER — LOSARTAN POTASSIUM 100 MG/1
TABLET ORAL
Qty: 90 TABLET | Refills: 3 | Status: SHIPPED | OUTPATIENT
Start: 2022-05-20

## 2022-05-20 NOTE — TELEPHONE ENCOUNTER
Refill Authorization Note   Joi Castellanos  is requesting a refill authorization.  Brief Assessment and Rationale for Refill:  Approve     Medication Therapy Plan:       Medication Reconciliation Completed: No   Comments:     No Care Gaps recommended.     Note composed:7:18 AM 05/20/2022

## 2022-05-23 ENCOUNTER — PATIENT MESSAGE (OUTPATIENT)
Dept: FAMILY MEDICINE | Facility: CLINIC | Age: 77
End: 2022-05-23
Payer: MEDICARE

## 2022-05-31 ENCOUNTER — PATIENT MESSAGE (OUTPATIENT)
Dept: ADMINISTRATIVE | Facility: HOSPITAL | Age: 77
End: 2022-05-31
Payer: MEDICARE

## 2022-06-01 DIAGNOSIS — E11.9 TYPE 2 DIABETES MELLITUS WITHOUT COMPLICATION, UNSPECIFIED WHETHER LONG TERM INSULIN USE: ICD-10-CM

## 2022-06-07 ENCOUNTER — OFFICE VISIT (OUTPATIENT)
Dept: FAMILY MEDICINE | Facility: CLINIC | Age: 77
End: 2022-06-07
Payer: MEDICARE

## 2022-06-07 VITALS
HEART RATE: 89 BPM | DIASTOLIC BLOOD PRESSURE: 66 MMHG | WEIGHT: 184.31 LBS | OXYGEN SATURATION: 98 % | HEIGHT: 73 IN | BODY MASS INDEX: 24.43 KG/M2 | TEMPERATURE: 98 F | SYSTOLIC BLOOD PRESSURE: 128 MMHG

## 2022-06-07 DIAGNOSIS — L72.0 EIC (EPIDERMAL INCLUSION CYST): ICD-10-CM

## 2022-06-07 DIAGNOSIS — D50.9 IRON DEFICIENCY ANEMIA, UNSPECIFIED IRON DEFICIENCY ANEMIA TYPE: ICD-10-CM

## 2022-06-07 DIAGNOSIS — E11.9 TYPE 2 DIABETES MELLITUS WITHOUT COMPLICATION, WITHOUT LONG-TERM CURRENT USE OF INSULIN: ICD-10-CM

## 2022-06-07 DIAGNOSIS — R05.9 COUGH: Primary | ICD-10-CM

## 2022-06-07 DIAGNOSIS — R53.83 FATIGUE, UNSPECIFIED TYPE: ICD-10-CM

## 2022-06-07 PROCEDURE — 99499 UNLISTED E&M SERVICE: CPT | Mod: S$GLB,,, | Performed by: FAMILY MEDICINE

## 2022-06-07 PROCEDURE — 3078F DIAST BP <80 MM HG: CPT | Mod: CPTII,S$GLB,, | Performed by: FAMILY MEDICINE

## 2022-06-07 PROCEDURE — 1159F MED LIST DOCD IN RCRD: CPT | Mod: CPTII,S$GLB,, | Performed by: FAMILY MEDICINE

## 2022-06-07 PROCEDURE — 99214 PR OFFICE/OUTPT VISIT, EST, LEVL IV, 30-39 MIN: ICD-10-PCS | Mod: S$GLB,,, | Performed by: FAMILY MEDICINE

## 2022-06-07 PROCEDURE — 3078F PR MOST RECENT DIASTOLIC BLOOD PRESSURE < 80 MM HG: ICD-10-PCS | Mod: CPTII,S$GLB,, | Performed by: FAMILY MEDICINE

## 2022-06-07 PROCEDURE — 99214 OFFICE O/P EST MOD 30 MIN: CPT | Mod: S$GLB,,, | Performed by: FAMILY MEDICINE

## 2022-06-07 PROCEDURE — 99999 PR PBB SHADOW E&M-EST. PATIENT-LVL III: CPT | Mod: PBBFAC,,, | Performed by: FAMILY MEDICINE

## 2022-06-07 PROCEDURE — 1126F AMNT PAIN NOTED NONE PRSNT: CPT | Mod: CPTII,S$GLB,, | Performed by: FAMILY MEDICINE

## 2022-06-07 PROCEDURE — 3288F FALL RISK ASSESSMENT DOCD: CPT | Mod: CPTII,S$GLB,, | Performed by: FAMILY MEDICINE

## 2022-06-07 PROCEDURE — 99999 PR PBB SHADOW E&M-EST. PATIENT-LVL III: ICD-10-PCS | Mod: PBBFAC,,, | Performed by: FAMILY MEDICINE

## 2022-06-07 PROCEDURE — 3044F PR MOST RECENT HEMOGLOBIN A1C LEVEL <7.0%: ICD-10-PCS | Mod: CPTII,S$GLB,, | Performed by: FAMILY MEDICINE

## 2022-06-07 PROCEDURE — 3074F PR MOST RECENT SYSTOLIC BLOOD PRESSURE < 130 MM HG: ICD-10-PCS | Mod: CPTII,S$GLB,, | Performed by: FAMILY MEDICINE

## 2022-06-07 PROCEDURE — 99499 RISK ADDL DX/OHS AUDIT: ICD-10-PCS | Mod: S$GLB,,, | Performed by: FAMILY MEDICINE

## 2022-06-07 PROCEDURE — 1126F PR PAIN SEVERITY QUANTIFIED, NO PAIN PRESENT: ICD-10-PCS | Mod: CPTII,S$GLB,, | Performed by: FAMILY MEDICINE

## 2022-06-07 PROCEDURE — 1101F PT FALLS ASSESS-DOCD LE1/YR: CPT | Mod: CPTII,S$GLB,, | Performed by: FAMILY MEDICINE

## 2022-06-07 PROCEDURE — 3044F HG A1C LEVEL LT 7.0%: CPT | Mod: CPTII,S$GLB,, | Performed by: FAMILY MEDICINE

## 2022-06-07 PROCEDURE — 1160F PR REVIEW ALL MEDS BY PRESCRIBER/CLIN PHARMACIST DOCUMENTED: ICD-10-PCS | Mod: CPTII,S$GLB,, | Performed by: FAMILY MEDICINE

## 2022-06-07 PROCEDURE — 1159F PR MEDICATION LIST DOCUMENTED IN MEDICAL RECORD: ICD-10-PCS | Mod: CPTII,S$GLB,, | Performed by: FAMILY MEDICINE

## 2022-06-07 PROCEDURE — 3288F PR FALLS RISK ASSESSMENT DOCUMENTED: ICD-10-PCS | Mod: CPTII,S$GLB,, | Performed by: FAMILY MEDICINE

## 2022-06-07 PROCEDURE — 1101F PR PT FALLS ASSESS DOC 0-1 FALLS W/OUT INJ PAST YR: ICD-10-PCS | Mod: CPTII,S$GLB,, | Performed by: FAMILY MEDICINE

## 2022-06-07 PROCEDURE — 1160F RVW MEDS BY RX/DR IN RCRD: CPT | Mod: CPTII,S$GLB,, | Performed by: FAMILY MEDICINE

## 2022-06-07 PROCEDURE — 3074F SYST BP LT 130 MM HG: CPT | Mod: CPTII,S$GLB,, | Performed by: FAMILY MEDICINE

## 2022-06-07 NOTE — PROGRESS NOTES
(Portions of this note were dictated using voice recognition software and may contain dictation related errors in spelling/grammar/syntax not found on text review)    CC:   Chief Complaint   Patient presents with    Cyst     Back of neck, drained clear discharge    Cough     Lingering mostly dry cough       HPI: 76 y.o. male     1.  Cough.  Had seen back in February for prolonged symptoms of congestion and coughing and  fever.  X-ray negative for pneumonia.  Had tried multiple medications including Zyrtec and Flonase but seem like it made him dry out a lot so he stopped taking everything.  Everything is mostly improved.  Still has residual cough periodically, feels some phlegm in the back of his throat.  Denies any significant rhinorrhea or nasal congestion.  No more fevers or chills.  No chest pain or shortness of breath.  No abdominal pain, nausea, vomiting.  Does feel like his energy level is low, feels like it is getting a little bit better as he states that he had significant fatigue when he was dealing with the issues a few months ago and now he is trying to do little bit more activity he feels like given his age just taking him a little bit longer to get back into things.  Only medicine he has been taking for this is nightly Coricidin which seems to help a little bit.    2. A couple weeks ago noticed a lump on the back of his neck that progressively enlarged.  Does not recall this being there before.  Was tender and red.  After making appointment, lesion spontaneously drain significantly.  The lump has significantly decreased in size and he feels like it is almost resolved.  He has no more pain.  No fevers or chills or systemic symptoms.  He just wanted to keep the appointment to have this evaluated        Other medical history:  Hypertension on losartan 100 mg daily  Diabetes on metformin 500 mg extended release daily  Dyslipidemia on Crestor 20 mg daily  Did have iron deficiency anemia on prior workup.   Denies any GI bleeding or other loss of blood.  Had recommended continued monitoring of this.  Tried some oral iron because significant constipation so he stopped  Past Medical History:   Diagnosis Date    Arthritis     Diabetes mellitus, type II     fasting bg criteria    Hyperlipidemia        Past Surgical History:   Procedure Laterality Date    TONSILLECTOMY         Family History   Problem Relation Age of Onset    Cancer Mother         unknown, met to spine    Hypertension Mother     Osteoarthritis Mother     Deep vein thrombosis Father     Heart attack Father     Pulmonary embolism Father     Emphysema Father     Arthritis Sister     No Known Problems Daughter     No Known Problems Son     Fibromyalgia Sister     Breast cancer Maternal Aunt     Heart disease Neg Hx     Prostate cancer Neg Hx     Colon cancer Neg Hx     Diabetes Neg Hx        Social History     Tobacco Use    Smoking status: Never Smoker    Smokeless tobacco: Never Used   Substance Use Topics    Alcohol use: Yes     Comment: Occasionally    Drug use: No       Lab Results   Component Value Date    WBC 4.39 03/15/2022    HGB 10.8 (L) 03/15/2022    HCT 32.3 (L) 03/15/2022    MCV 93 03/15/2022     03/15/2022    CHOL 128 02/18/2022    TRIG 89 02/18/2022    HDL 21 (L) 02/18/2022    ALT 33 03/15/2022    AST 27 03/15/2022    BILITOT 0.5 03/15/2022    ALKPHOS 110 03/15/2022     03/15/2022    K 4.5 03/15/2022    CL 99 03/15/2022    CREATININE 0.91 03/15/2022    ESTGFRAFRICA >60.0 03/15/2022    EGFRNONAA >60.0 03/15/2022    CALCIUM 9.7 03/15/2022    ALBUMIN 4.3 03/15/2022    BUN 13 03/15/2022    CO2 30 (H) 03/15/2022    TSH 1.112 09/25/2020    HGBA1C 6.6 (H) 02/18/2022    MICROALBUR 0.6 06/05/2019    MICALBCREAT Unable to calculate 02/18/2022    LDLCALC 89.2 02/18/2022     (H) 03/15/2022       Hemoglobin (g/dL)   Date Value   03/15/2022 10.8 (L)   02/22/2022 10.8 (L)   02/18/2022 10.5 (L)   12/10/2020 15.5  "  09/25/2020 15.4   02/25/2019 16.0   03/12/2018 15.3   09/23/2016 15.9   08/19/2015 16.3   11/20/2013 15.7   .hgba1c:10    Vital signs reviewed  Vitals:    06/07/22 1103   BP: 128/66   BP Location: Left arm   Patient Position: Sitting   BP Method: Medium (Manual)   Pulse: 89   Temp: 98.3 °F (36.8 °C)   TempSrc: Oral   SpO2: 98%   Weight: 83.6 kg (184 lb 4.9 oz)   Height: 6' 1" (1.854 m)       Wt Readings from Last 4 Encounters:   02/22/22 88 kg (194 lb 0.1 oz)   02/16/22 92.6 kg (204 lb 2.3 oz)   12/10/20 92.3 kg (203 lb 7.8 oz)   09/25/20 90.4 kg (199 lb 4.7 oz)       PE:   APPEARANCE: Well nourished, well developed, in no acute distress.    HEAD: Normocephalic, atraumatic.  EYES: PERRL. EOMI.   Conjunctivae noninjected.  EARS: TM's intact. Light reflex normal. No retraction or perforation  NOSE: Mucosa pink. Airway clear.  Minor turbinate edema  MOUTH & THROAT: No tonsillar enlargement.  Minor posterior cobblestoning noted   NECK: Supple with no cervical lymphadenopathy.  Posterior aspect of neck:  Open wound with some whitish-yellow particulate matter still draining on compression consistent with likely EIC.  It is somewhat tender when compressed but overall patient feels like his pain is much improved.  CHEST: Good inspiratory effort. Lungs clear to auscultation with no wheezes or crackles.  CARDIOVASCULAR: Normal S1, S2. No rubs, murmurs, or gallops.  ABDOMEN: Bowel sounds normal. Not distended. Soft. No tenderness or masses. No organomegaly.  EXTREMITIES: No edema  DIABETIC FOOT EXAM: Protective Sensation (w/ 10 gram monofilament):  Right: Intact  Left: Intact    Visual Inspection:  Onychomycosis -  Bilateral    Pedal Pulses:   Right: Present  Left: Present    Posterior tibialis:   Right:Present  Left: Present              IMPRESSION  1. Cough    2. Type 2 diabetes mellitus without complication, without long-term current use of insulin    3. Iron deficiency anemia, unspecified iron deficiency anemia type  "   4. Fatigue, unspecified type    5. EIC (epidermal inclusion cyst)            PLAN  Orders Placed This Encounter   Procedures    CBC Auto Differential    Comprehensive Metabolic Panel    Lipid Panel    TSH    Hemoglobin A1C    Microalbumin/Creatinine Ratio, Urine    Ferritin    Iron and TIBC       cough, generally improving.  Seems related to postnasal drip.  Seems like he had a major problem with Zyrtec in the past so he is not taking this anymore.  The Coricidin does not seem to cause as much over drying effect and he is more tolerant of this.  He could try to add back Flonase 2 sprays each nostril once daily for few weeks to see if this helps.  If still not tolerant of this, could consider ipratropium nasal spray.  Also can consider saline nose sprays OTC.  Advised on Neti pot although he did not feel like he could comply with that    EIC likely inflamed and self drained.  Still has some slight residual drainage noted on compression but overall lesion has markedly reduced in size per patient.  Can likely hold off on any further intervention at this time.  Advised on local wound care with soap and water.  If resolving, no further treatment needed.  If recurrent or reaccumulating may suggest I and D punch biopsy core drainage/attempt at wall removal    Update labs for diabetes, update hematologic parameters and iron studies as well                 SCREENINGS (males >=65)     Immunizations:  tdap:   March 2019  Zoster:  Can get at pharmacy  PCV 2016  PVX 2018  COVID:  01/10/2021, 01/31/2021, encourage booster     Hepatitis-C screening up-to-date 2016     Has not had a colonoscopy in many years

## 2022-06-10 DIAGNOSIS — E11.65 TYPE 2 DIABETES MELLITUS WITH HYPERGLYCEMIA, WITHOUT LONG-TERM CURRENT USE OF INSULIN: ICD-10-CM

## 2022-06-10 RX ORDER — METFORMIN HYDROCHLORIDE 500 MG/1
500 TABLET, EXTENDED RELEASE ORAL DAILY
Qty: 90 TABLET | Refills: 3 | Status: SHIPPED | OUTPATIENT
Start: 2022-06-10 | End: 2022-09-15

## 2022-06-10 RX ORDER — METFORMIN HYDROCHLORIDE 500 MG/1
TABLET, EXTENDED RELEASE ORAL
Qty: 90 TABLET | Refills: 0 | Status: SHIPPED | OUTPATIENT
Start: 2022-06-10 | End: 2022-06-10 | Stop reason: SDUPTHER

## 2022-06-12 ENCOUNTER — PATIENT MESSAGE (OUTPATIENT)
Dept: FAMILY MEDICINE | Facility: CLINIC | Age: 77
End: 2022-06-12
Payer: MEDICARE

## 2022-06-12 DIAGNOSIS — D53.9 NUTRITIONAL ANEMIA, UNSPECIFIED: ICD-10-CM

## 2022-06-12 DIAGNOSIS — E11.9 TYPE 2 DIABETES MELLITUS WITHOUT COMPLICATION, WITHOUT LONG-TERM CURRENT USE OF INSULIN: Primary | ICD-10-CM

## 2022-06-13 ENCOUNTER — PATIENT MESSAGE (OUTPATIENT)
Dept: FAMILY MEDICINE | Facility: CLINIC | Age: 77
End: 2022-06-13
Payer: MEDICARE

## 2022-06-13 NOTE — TELEPHONE ENCOUNTER
Portal msg sent, labs 3 mo with visit after labs back    Orders Placed This Encounter   Procedures    CBC Auto Differential    Iron and TIBC    Ferritin    Folate    Vitamin B12    Reticulocytes    Comprehensive Metabolic Panel    Hemoglobin A1C     Rosa Castellanos     Your recent labs were reviewed and released to your account. Your diabetes test is controlled. Your liver tests have returned to normal. Your blood counts are still low however from 2020 but are fairly stable from last few labs and haven't significantly reduced further. Iron levels did not appear low. I do recommend as long as you are feeling ok and not weak/dizzy/fatigued like before we can check back with these labs in 3 months. If you are having any progressive symptoms are noticing any bleeding issues we'll have to work this up sooner.      Marc Lakhani MD    v

## 2022-06-14 ENCOUNTER — PATIENT MESSAGE (OUTPATIENT)
Dept: ADMINISTRATIVE | Facility: HOSPITAL | Age: 77
End: 2022-06-14
Payer: MEDICARE

## 2022-06-20 ENCOUNTER — PATIENT MESSAGE (OUTPATIENT)
Dept: FAMILY MEDICINE | Facility: CLINIC | Age: 77
End: 2022-06-20
Payer: MEDICARE

## 2022-06-30 DIAGNOSIS — E78.00 PURE HYPERCHOLESTEROLEMIA: ICD-10-CM

## 2022-06-30 RX ORDER — ROSUVASTATIN CALCIUM 20 MG/1
TABLET, COATED ORAL
Qty: 90 TABLET | Refills: 3 | Status: SHIPPED | OUTPATIENT
Start: 2022-06-30

## 2022-06-30 NOTE — TELEPHONE ENCOUNTER
No new care gaps identified.  [facility] Embedded Care Gaps. Reference number: [id number]. 6/30/2022   8:23:31 AM CDT

## 2022-06-30 NOTE — TELEPHONE ENCOUNTER
Refill Decision Note   Joi Jasmin  is requesting a refill authorization.  Brief Assessment and Rationale for Refill:  Approve     Medication Therapy Plan:       Medication Reconciliation Completed: No   Comments:     No Care Gaps recommended.     Note composed:1:33 PM 06/30/2022

## 2022-08-24 ENCOUNTER — PATIENT MESSAGE (OUTPATIENT)
Dept: ADMINISTRATIVE | Facility: HOSPITAL | Age: 77
End: 2022-08-24
Payer: MEDICARE

## 2022-09-15 ENCOUNTER — OFFICE VISIT (OUTPATIENT)
Dept: FAMILY MEDICINE | Facility: CLINIC | Age: 77
End: 2022-09-15
Payer: MEDICARE

## 2022-09-15 VITALS
WEIGHT: 177 LBS | TEMPERATURE: 98 F | DIASTOLIC BLOOD PRESSURE: 60 MMHG | SYSTOLIC BLOOD PRESSURE: 132 MMHG | OXYGEN SATURATION: 98 % | HEIGHT: 73 IN | BODY MASS INDEX: 23.46 KG/M2 | HEART RATE: 90 BPM

## 2022-09-15 DIAGNOSIS — R06.09 DOE (DYSPNEA ON EXERTION): Primary | ICD-10-CM

## 2022-09-15 DIAGNOSIS — E11.9 TYPE 2 DIABETES MELLITUS WITHOUT COMPLICATION, WITHOUT LONG-TERM CURRENT USE OF INSULIN: ICD-10-CM

## 2022-09-15 DIAGNOSIS — R05.9 COUGH: ICD-10-CM

## 2022-09-15 DIAGNOSIS — D50.9 IRON DEFICIENCY ANEMIA, UNSPECIFIED IRON DEFICIENCY ANEMIA TYPE: ICD-10-CM

## 2022-09-15 PROCEDURE — 93005 ELECTROCARDIOGRAM TRACING: CPT | Mod: S$GLB,,, | Performed by: FAMILY MEDICINE

## 2022-09-15 PROCEDURE — 3044F PR MOST RECENT HEMOGLOBIN A1C LEVEL <7.0%: ICD-10-PCS | Mod: CPTII,S$GLB,, | Performed by: FAMILY MEDICINE

## 2022-09-15 PROCEDURE — 3288F PR FALLS RISK ASSESSMENT DOCUMENTED: ICD-10-PCS | Mod: CPTII,S$GLB,, | Performed by: FAMILY MEDICINE

## 2022-09-15 PROCEDURE — 3075F PR MOST RECENT SYSTOLIC BLOOD PRESS GE 130-139MM HG: ICD-10-PCS | Mod: CPTII,S$GLB,, | Performed by: FAMILY MEDICINE

## 2022-09-15 PROCEDURE — 1126F PR PAIN SEVERITY QUANTIFIED, NO PAIN PRESENT: ICD-10-PCS | Mod: CPTII,S$GLB,, | Performed by: FAMILY MEDICINE

## 2022-09-15 PROCEDURE — 93010 EKG 12-LEAD: ICD-10-PCS | Mod: S$GLB,,, | Performed by: INTERNAL MEDICINE

## 2022-09-15 PROCEDURE — 93005 EKG 12-LEAD: ICD-10-PCS | Mod: S$GLB,,, | Performed by: FAMILY MEDICINE

## 2022-09-15 PROCEDURE — 1101F PR PT FALLS ASSESS DOC 0-1 FALLS W/OUT INJ PAST YR: ICD-10-PCS | Mod: CPTII,S$GLB,, | Performed by: FAMILY MEDICINE

## 2022-09-15 PROCEDURE — 1126F AMNT PAIN NOTED NONE PRSNT: CPT | Mod: CPTII,S$GLB,, | Performed by: FAMILY MEDICINE

## 2022-09-15 PROCEDURE — 99215 PR OFFICE/OUTPT VISIT, EST, LEVL V, 40-54 MIN: ICD-10-PCS | Mod: S$GLB,,, | Performed by: FAMILY MEDICINE

## 2022-09-15 PROCEDURE — 3078F PR MOST RECENT DIASTOLIC BLOOD PRESSURE < 80 MM HG: ICD-10-PCS | Mod: CPTII,S$GLB,, | Performed by: FAMILY MEDICINE

## 2022-09-15 PROCEDURE — 99999 PR PBB SHADOW E&M-EST. PATIENT-LVL III: ICD-10-PCS | Mod: PBBFAC,,, | Performed by: FAMILY MEDICINE

## 2022-09-15 PROCEDURE — 3044F HG A1C LEVEL LT 7.0%: CPT | Mod: CPTII,S$GLB,, | Performed by: FAMILY MEDICINE

## 2022-09-15 PROCEDURE — 3075F SYST BP GE 130 - 139MM HG: CPT | Mod: CPTII,S$GLB,, | Performed by: FAMILY MEDICINE

## 2022-09-15 PROCEDURE — 3078F DIAST BP <80 MM HG: CPT | Mod: CPTII,S$GLB,, | Performed by: FAMILY MEDICINE

## 2022-09-15 PROCEDURE — 1101F PT FALLS ASSESS-DOCD LE1/YR: CPT | Mod: CPTII,S$GLB,, | Performed by: FAMILY MEDICINE

## 2022-09-15 PROCEDURE — 1159F MED LIST DOCD IN RCRD: CPT | Mod: CPTII,S$GLB,, | Performed by: FAMILY MEDICINE

## 2022-09-15 PROCEDURE — 1159F PR MEDICATION LIST DOCUMENTED IN MEDICAL RECORD: ICD-10-PCS | Mod: CPTII,S$GLB,, | Performed by: FAMILY MEDICINE

## 2022-09-15 PROCEDURE — 93010 ELECTROCARDIOGRAM REPORT: CPT | Mod: S$GLB,,, | Performed by: INTERNAL MEDICINE

## 2022-09-15 PROCEDURE — 99215 OFFICE O/P EST HI 40 MIN: CPT | Mod: S$GLB,,, | Performed by: FAMILY MEDICINE

## 2022-09-15 PROCEDURE — 99999 PR PBB SHADOW E&M-EST. PATIENT-LVL III: CPT | Mod: PBBFAC,,, | Performed by: FAMILY MEDICINE

## 2022-09-15 PROCEDURE — 3288F FALL RISK ASSESSMENT DOCD: CPT | Mod: CPTII,S$GLB,, | Performed by: FAMILY MEDICINE

## 2022-09-15 RX ORDER — FLUTICASONE PROPIONATE 50 MCG
2 SPRAY, SUSPENSION (ML) NASAL DAILY
Qty: 16 G | Refills: 11 | Status: SHIPPED | OUTPATIENT
Start: 2022-09-15 | End: 2022-11-15

## 2022-09-15 NOTE — PROGRESS NOTES
"(Portions of this note were dictated using voice recognition software and may contain dictation related errors in spelling/grammar/syntax not found on text review)    CC:   Chief Complaint   Patient presents with    Follow-up     Vomiting for about 2 days, stopped metformin at that time    Sinus Problem     Nasal drip and occasional cough, "fluid feeling" in right ear       HPI: 76 y.o. male   Mr Castellanos is a 76 year old male here for 3 month follow up of labs. Today he feels well, believes that he has been improving in his health, however the fatigue still persists and he is frustrated he is not able to do all the things he used to do. Wife does endorse that he is fatigued and tired throughout the day. Gets short of breath when he travels up 1 flight of stairs, that was different than what he was experiencing last time he was here few months ago where he was not having that problem.  but thinks this is related to sinus problem. Took flonase in the past but not currently.  States that he will get some postnasal drip and some dry cough, sometimes has kept him up at nighttime. Additional complaint, right ear feels like there is high pressure. Also difficult to hear. Thinks the event happens at random. Sitting down, relaxing head with ear will improve. Left ear is normal. Endorses bilateral tinnitus.    Diabetes, was on metformin extended release 500 mg daily but found that he was starting to get significant diarrhea and nausea.  Stopped it a month ago in symptoms got completely better.  His recent A1c was stable at 6.1    Hypertension stable     Significant anemia which has been worsening.  Initially he was acutely ill with significant respiratory symptoms with coughing and fatigue and general unwellness earlier in the year.  Labs at that time showed transaminitis, low hemoglobin.  Mono testing negative.  Chest x-ray was clear of pneumonia.  Symptoms eventually improved and resolved.  Labs showed resolution of " transaminase elevation.  Hemoglobin in the 10 range was fairly stable.  He had flatly declined any colonoscopy procedure to be done as he states that he had 1 in the past swore I would never do it again.  He feels like the prep was the main issue that he had a problem with at that time.  The colonoscopy was reportedly normal however.  Based on this issue we discussed short-term follow-up and retesting labs.  It does however look like his hemoglobin has dropped to 9.3.  Ferritin is very high, iron saturation is 13.  B12 level elevated (does take B12 supplement) he denies any visible GI blood loss in the stool.  However, he has been having some generalized aches and pains in his joints and has been taking quite a bit of aspirin, states that he will take 500 mg aspirin pill, sometimes 2 at a time and sometimes twice a day.  Denies any abdominal pain or nausea or vomiting absent the metformin above         Past Medical History:   Diagnosis Date    Arthritis     Diabetes mellitus, type II     fasting bg criteria    Hyperlipidemia        Past Surgical History:   Procedure Laterality Date    TONSILLECTOMY         Family History   Problem Relation Age of Onset    Cancer Mother         unknown, met to spine    Hypertension Mother     Osteoarthritis Mother     Deep vein thrombosis Father     Heart attack Father     Pulmonary embolism Father     Emphysema Father     Arthritis Sister     No Known Problems Daughter     No Known Problems Son     Fibromyalgia Sister     Breast cancer Maternal Aunt     Heart disease Neg Hx     Prostate cancer Neg Hx     Colon cancer Neg Hx     Diabetes Neg Hx        Social History     Tobacco Use    Smoking status: Never    Smokeless tobacco: Never   Substance Use Topics    Alcohol use: Yes     Comment: Occasionally    Drug use: No       Lab Results   Component Value Date    WBC 5.91 09/08/2022    HGB 9.3 (L) 09/08/2022    HCT 28.3 (L) 09/08/2022    MCV 99 (H) 09/08/2022     09/08/2022     "CHOL 116 (L) 06/08/2022    TRIG 98 06/08/2022    HDL 33 (L) 06/08/2022    ALT 32 09/08/2022    AST 28 09/08/2022    BILITOT 0.7 09/08/2022    ALKPHOS 189 (H) 09/08/2022     09/08/2022    K 4.8 09/08/2022    CL 97 09/08/2022    CREATININE 0.92 09/08/2022    ESTGFRAFRICA >60.0 06/08/2022    EGFRNONAA >60.0 06/08/2022    EGFRNORACEVR >60.0 09/08/2022    CALCIUM 9.5 09/08/2022    ALBUMIN 4.3 09/08/2022    BUN 13 09/08/2022    CO2 29 09/08/2022    TSH 1.510 06/08/2022    HGBA1C 6.1 (H) 09/08/2022    MICROALBUR 0.6 06/05/2019    MICALBCREAT 6.3 06/08/2022    LDLCALC 63.4 06/08/2022     (H) 09/08/2022                 Vital signs reviewed  Vitals:    09/15/22 1028   BP: 132/60   BP Location: Left arm   Patient Position: Sitting   BP Method: Medium (Manual)   Pulse: 90   Temp: 98.2 °F (36.8 °C)   TempSrc: Oral   SpO2: 98%   Weight: 80.3 kg (177 lb 0.5 oz)   Height: 6' 1" (1.854 m)       Wt Readings from Last 4 Encounters:   09/15/22 80.3 kg (177 lb 0.5 oz)   06/07/22 83.6 kg (184 lb 4.9 oz)   02/22/22 88 kg (194 lb 0.1 oz)   02/16/22 92.6 kg (204 lb 2.3 oz)       PE:   APPEARANCE: Well nourished, well developed, in no acute distress.    HEAD: Normocephalic, atraumatic.  EYES: PERRL. EOMI.   Conjunctivae noninjected.  EARS: TM's intact. Light reflex normal. No retraction or perforation  NOSE: Mucosa pink. Airway clear.  MOUTH & THROAT: No tonsillar enlargement. No pharyngeal erythema or exudate.   NECK: Supple with no cervical lymphadenopathy.    CHEST: Good inspiratory effort. Lungs clear to auscultation with no wheezes or crackles.  CARDIOVASCULAR: Normal S1, S2. No rubs, murmurs, or gallops.  ABDOMEN: Bowel sounds normal. Not distended. Soft. No tenderness or masses. No organomegaly.  EXTREMITIES: No edema, cyanosis, or clubbing.      IMPRESSION  1. DORMAN (dyspnea on exertion)    2. Iron deficiency anemia, unspecified iron deficiency anemia type    3. Type 2 diabetes mellitus without complication, without " long-term current use of insulin    4. Cough            PLAN  Orders Placed This Encounter   Procedures    Ambulatory referral/consult to Gastroenterology    EKG 12-lead       Medications Ordered This Encounter   Medications    fluticasone propionate (FLONASE) 50 mcg/actuation nasal spray     Si sprays (100 mcg total) by Each Nostril route once daily.     Dispense:  16 g     Refill:  11      Diabetes stable, has been off metformin because of GI side effects but okay to stay off for now, can recheck in 3 months     Blood pressure stable     Concerned about his anemia which seems progressive.  Low iron saturation, elevated reticulocytes, high ferritin, high B12.  He is having some symptoms like fatigue and dyspnea on exertion.  Did an EKG today personally reviewed showing normal sinus rhythm no ectopy rate of 77, normal axis and intervals.  No acute ST or T-wave changes.  He has had some weight loss over the last couple years, some of which is intentional but maybe not all of it.  He felt like when he was having the GI side effects of metformin he was not eaten much but now is off metformin he is eating like crazy.  He feels like he probably put a little bit of weight back on from his ignacio at home.  He denies any fevers chills or sweats.  Risk factors for GI bleed source include significant aspirin use.  He has not had any recent screening colonoscopy.  I have implored him on GI workup, and after further discussion he  does agree to GI consultation    For poss AR: flonase 2 sprays/nostril daily for 2-3 wks.     Total time spent including face-to-face time and chart time:  45 min

## 2022-09-16 ENCOUNTER — TELEPHONE (OUTPATIENT)
Dept: ADMINISTRATIVE | Facility: OTHER | Age: 77
End: 2022-09-16
Payer: MEDICARE

## 2022-09-19 ENCOUNTER — PATIENT MESSAGE (OUTPATIENT)
Dept: FAMILY MEDICINE | Facility: CLINIC | Age: 77
End: 2022-09-19
Payer: MEDICARE

## 2022-09-21 ENCOUNTER — TELEPHONE (OUTPATIENT)
Dept: GASTROENTEROLOGY | Facility: CLINIC | Age: 77
End: 2022-09-21
Payer: MEDICARE

## 2022-09-21 NOTE — TELEPHONE ENCOUNTER
----- Message from Bella Montoya sent at 9/21/2022 10:02 AM CDT -----  Patient has a referral placed by Dr. Lakhani, diagnosis is     DORMAN (dyspnea on exertion) [R06.00]  Iron deficiency anemia, unspecified iron deficiency anemia type [D50.9]    Can you please assist in scheduling patient, thank you

## 2022-09-22 ENCOUNTER — TELEPHONE (OUTPATIENT)
Dept: FAMILY MEDICINE | Facility: CLINIC | Age: 77
End: 2022-09-22
Payer: MEDICARE

## 2022-10-10 ENCOUNTER — PATIENT MESSAGE (OUTPATIENT)
Dept: ADMINISTRATIVE | Facility: HOSPITAL | Age: 77
End: 2022-10-10
Payer: MEDICARE

## 2022-10-13 ENCOUNTER — PATIENT MESSAGE (OUTPATIENT)
Dept: FAMILY MEDICINE | Facility: CLINIC | Age: 77
End: 2022-10-13
Payer: MEDICARE

## 2022-10-18 ENCOUNTER — TELEPHONE (OUTPATIENT)
Dept: FAMILY MEDICINE | Facility: CLINIC | Age: 77
End: 2022-10-18
Payer: MEDICARE

## 2022-10-19 ENCOUNTER — PATIENT MESSAGE (OUTPATIENT)
Dept: FAMILY MEDICINE | Facility: CLINIC | Age: 77
End: 2022-10-19
Payer: MEDICARE

## 2022-10-19 NOTE — TELEPHONE ENCOUNTER
Spoke with patient. Patient wanted to move gastro appt up. Gave patient gastro dept number and notified him his appt on wait list also. Patient stated he was also having leg swelling. Patient stated he didn't wanna see no one else but emmanuel only. Patient stated he has leg swelling I notified him we can get him w/  patient declined. Patient stated if it gets worse he will until then no.

## 2022-10-19 NOTE — TELEPHONE ENCOUNTER
----- Message from Amirah Johnson sent at 10/17/2022 12:05 PM CDT -----  Type:  Patient Returning Call    Who Called:pt   Who Left Message for Patient:  Does the patient know what this is regarding?:pt   Would the patient rather a call back or a response via [facility]? Call back   Best Call Back Number:[phone]  Additional Information: pt states that he feels very weak and would like to speak with dr or nurse. Pt would like an call back asa and states he just seems to be getting weaker.

## 2022-10-26 ENCOUNTER — HOSPITAL ENCOUNTER (EMERGENCY)
Facility: HOSPITAL | Age: 77
Discharge: HOME | End: 2022-10-27
Attending: STUDENT IN AN ORGANIZED HEALTH CARE EDUCATION/TRAINING PROGRAM
Payer: MEDICARE

## 2022-10-26 DIAGNOSIS — M84.421A PATHOLOGICAL FRACTURE OF RIGHT HUMERUS, UNSPECIFIED PATHOLOGICAL CAUSE, INITIAL ENCOUNTER: ICD-10-CM

## 2022-10-26 DIAGNOSIS — S42.294A OTHER CLOSED NONDISPLACED FRACTURE OF PROXIMAL END OF RIGHT HUMERUS, INITIAL ENCOUNTER: Primary | ICD-10-CM

## 2022-10-26 DIAGNOSIS — M25.511 RIGHT SHOULDER PAIN: ICD-10-CM

## 2022-10-26 DIAGNOSIS — M79.601 RIGHT ARM PAIN: ICD-10-CM

## 2022-10-26 LAB
ALBUMIN SERPL BCP-MCNC: 3.1 G/DL (ref 3.5–5.2)
ALP SERPL-CCNC: 525 U/L (ref 55–135)
ALT SERPL W/O P-5'-P-CCNC: 17 U/L (ref 10–44)
ANION GAP SERPL CALC-SCNC: 14 MMOL/L (ref 8–16)
AST SERPL-CCNC: 21 U/L (ref 10–40)
BILIRUB SERPL-MCNC: 0.5 MG/DL (ref 0.1–1)
BUN SERPL-MCNC: 15 MG/DL (ref 8–23)
CALCIUM SERPL-MCNC: 9.1 MG/DL (ref 8.7–10.5)
CHLORIDE SERPL-SCNC: 97 MMOL/L (ref 95–110)
CO2 SERPL-SCNC: 27 MMOL/L (ref 23–29)
CREAT SERPL-MCNC: 0.8 MG/DL (ref 0.5–1.4)
ERYTHROCYTE [DISTWIDTH] IN BLOOD BY AUTOMATED COUNT: 15.8 % (ref 11.5–14.5)
EST. GFR  (NO RACE VARIABLE): >60 ML/MIN/1.73 M^2
GLUCOSE SERPL-MCNC: 213 MG/DL (ref 70–110)
HCT VFR BLD AUTO: 25.7 % (ref 40–54)
HGB BLD-MCNC: 8.4 G/DL (ref 14–18)
MCH RBC QN AUTO: 32.7 PG (ref 27–31)
MCHC RBC AUTO-ENTMCNC: 32.7 G/DL (ref 32–36)
MCV RBC AUTO: 100 FL (ref 82–98)
PLATELET # BLD AUTO: 123 K/UL (ref 150–450)
PMV BLD AUTO: 9.6 FL (ref 9.2–12.9)
POTASSIUM SERPL-SCNC: 4.5 MMOL/L (ref 3.5–5.1)
PROT SERPL-MCNC: 7.7 G/DL (ref 6–8.4)
RBC # BLD AUTO: 2.57 M/UL (ref 4.6–6.2)
SODIUM SERPL-SCNC: 138 MMOL/L (ref 136–145)
WBC # BLD AUTO: 12.43 K/UL (ref 3.9–12.7)

## 2022-10-26 PROCEDURE — 99284 EMERGENCY DEPT VISIT MOD MDM: CPT | Mod: 25

## 2022-10-26 PROCEDURE — 80053 COMPREHEN METABOLIC PANEL: CPT | Performed by: EMERGENCY MEDICINE

## 2022-10-26 PROCEDURE — 85027 COMPLETE CBC AUTOMATED: CPT | Performed by: EMERGENCY MEDICINE

## 2022-10-26 PROCEDURE — 29105 APPLICATION LONG ARM SPLINT: CPT

## 2022-10-26 RX ORDER — OXYCODONE AND ACETAMINOPHEN 5; 325 MG/1; MG/1
1 TABLET ORAL EVERY 6 HOURS PRN
Qty: 12 TABLET | Refills: 0 | Status: SHIPPED | OUTPATIENT
Start: 2022-10-26 | End: 2022-11-14

## 2022-10-26 NOTE — Clinical Note
Jc Landrum accompanied their family member to the emergency department on 10/26/2022. They may return to work on 10/28/2022.      If you have any questions or concerns, please don't hesitate to call.      Jc Gonsales MD

## 2022-10-27 ENCOUNTER — OFFICE VISIT (OUTPATIENT)
Dept: GASTROENTEROLOGY | Facility: CLINIC | Age: 77
End: 2022-10-27
Payer: MEDICARE

## 2022-10-27 VITALS
SYSTOLIC BLOOD PRESSURE: 135 MMHG | DIASTOLIC BLOOD PRESSURE: 67 MMHG | HEIGHT: 74 IN | HEIGHT: 74 IN | OXYGEN SATURATION: 96 % | HEART RATE: 94 BPM | RESPIRATION RATE: 17 BRPM | WEIGHT: 170 LBS | DIASTOLIC BLOOD PRESSURE: 62 MMHG | WEIGHT: 170 LBS | BODY MASS INDEX: 21.82 KG/M2 | BODY MASS INDEX: 21.82 KG/M2 | SYSTOLIC BLOOD PRESSURE: 154 MMHG | TEMPERATURE: 98 F | HEART RATE: 94 BPM

## 2022-10-27 DIAGNOSIS — D50.9 IRON DEFICIENCY ANEMIA, UNSPECIFIED IRON DEFICIENCY ANEMIA TYPE: ICD-10-CM

## 2022-10-27 PROCEDURE — 1160F PR REVIEW ALL MEDS BY PRESCRIBER/CLIN PHARMACIST DOCUMENTED: ICD-10-PCS | Mod: CPTII,S$GLB,, | Performed by: NURSE PRACTITIONER

## 2022-10-27 PROCEDURE — 99999 PR PBB SHADOW E&M-EST. PATIENT-LVL IV: ICD-10-PCS | Mod: PBBFAC,,, | Performed by: NURSE PRACTITIONER

## 2022-10-27 PROCEDURE — 99999 PR PBB SHADOW E&M-EST. PATIENT-LVL IV: CPT | Mod: PBBFAC,,, | Performed by: NURSE PRACTITIONER

## 2022-10-27 PROCEDURE — 1125F PR PAIN SEVERITY QUANTIFIED, PAIN PRESENT: ICD-10-PCS | Mod: CPTII,S$GLB,, | Performed by: NURSE PRACTITIONER

## 2022-10-27 PROCEDURE — 3288F FALL RISK ASSESSMENT DOCD: CPT | Mod: CPTII,S$GLB,, | Performed by: NURSE PRACTITIONER

## 2022-10-27 PROCEDURE — 1160F RVW MEDS BY RX/DR IN RCRD: CPT | Mod: CPTII,S$GLB,, | Performed by: NURSE PRACTITIONER

## 2022-10-27 PROCEDURE — 3075F SYST BP GE 130 - 139MM HG: CPT | Mod: CPTII,S$GLB,, | Performed by: NURSE PRACTITIONER

## 2022-10-27 PROCEDURE — 1100F PTFALLS ASSESS-DOCD GE2>/YR: CPT | Mod: CPTII,S$GLB,, | Performed by: NURSE PRACTITIONER

## 2022-10-27 PROCEDURE — 99203 PR OFFICE/OUTPT VISIT, NEW, LEVL III, 30-44 MIN: ICD-10-PCS | Mod: S$GLB,,, | Performed by: NURSE PRACTITIONER

## 2022-10-27 PROCEDURE — 1125F AMNT PAIN NOTED PAIN PRSNT: CPT | Mod: CPTII,S$GLB,, | Performed by: NURSE PRACTITIONER

## 2022-10-27 PROCEDURE — 99203 OFFICE O/P NEW LOW 30 MIN: CPT | Mod: S$GLB,,, | Performed by: NURSE PRACTITIONER

## 2022-10-27 PROCEDURE — 1100F PR PT FALLS ASSESS DOC 2+ FALLS/FALL W/INJURY/YR: ICD-10-PCS | Mod: CPTII,S$GLB,, | Performed by: NURSE PRACTITIONER

## 2022-10-27 PROCEDURE — 25000003 PHARM REV CODE 250: Performed by: STUDENT IN AN ORGANIZED HEALTH CARE EDUCATION/TRAINING PROGRAM

## 2022-10-27 PROCEDURE — 3078F DIAST BP <80 MM HG: CPT | Mod: CPTII,S$GLB,, | Performed by: NURSE PRACTITIONER

## 2022-10-27 PROCEDURE — 1159F MED LIST DOCD IN RCRD: CPT | Mod: CPTII,S$GLB,, | Performed by: NURSE PRACTITIONER

## 2022-10-27 PROCEDURE — 1159F PR MEDICATION LIST DOCUMENTED IN MEDICAL RECORD: ICD-10-PCS | Mod: CPTII,S$GLB,, | Performed by: NURSE PRACTITIONER

## 2022-10-27 PROCEDURE — 3078F PR MOST RECENT DIASTOLIC BLOOD PRESSURE < 80 MM HG: ICD-10-PCS | Mod: CPTII,S$GLB,, | Performed by: NURSE PRACTITIONER

## 2022-10-27 PROCEDURE — 3288F PR FALLS RISK ASSESSMENT DOCUMENTED: ICD-10-PCS | Mod: CPTII,S$GLB,, | Performed by: NURSE PRACTITIONER

## 2022-10-27 PROCEDURE — 3075F PR MOST RECENT SYSTOLIC BLOOD PRESS GE 130-139MM HG: ICD-10-PCS | Mod: CPTII,S$GLB,, | Performed by: NURSE PRACTITIONER

## 2022-10-27 RX ORDER — OXYCODONE AND ACETAMINOPHEN 5; 325 MG/1; MG/1
1 TABLET ORAL
Status: COMPLETED | OUTPATIENT
Start: 2022-10-27 | End: 2022-10-27

## 2022-10-27 RX ORDER — METHOCARBAMOL 500 MG/1
1000 TABLET, FILM COATED ORAL 3 TIMES DAILY
Qty: 30 TABLET | Refills: 0 | Status: ON HOLD | OUTPATIENT
Start: 2022-10-27 | End: 2022-11-02

## 2022-10-27 RX ADMIN — OXYCODONE HYDROCHLORIDE AND ACETAMINOPHEN 1 TABLET: 5; 325 TABLET ORAL at 12:10

## 2022-10-27 NOTE — ED PROVIDER NOTES
Encounter Date: 10/26/2022       History     Chief Complaint   Patient presents with    Shoulder Pain     77 y.o.m. c/o right shoulder pain x 5 hours. Pt states he was sitting down to use the bathroom when he felt a crunching sound in his right shoulder. Positive pulse, motor, sensory to right extremity. Negative swelling, deformities. Limited range of motion.      77-year-old male history of type 2 diabetes, hyperlipidemia presents after mechanical fall while he was in the bathroom.  Patient reports he braced his fall with his right upper extremity and heard a crunching sound in his shoulder and had significant pain since.  He denies any head injury or LOC. denies any numbness or tingling.  Reports pain as being dull/spastic and limited to the right shoulder.  Pain currently rated 7/10 and worsened with range of motion.    Patient reports since February he has been feeling unwell and was told his blood counts were as low and has follow-up with GI tomorrow for colonoscopy and endoscopy.  He reports initially started with rhinorrhea and cough but the coughing has subsided.  States he has been feeling generally weak since that time.  Furthermore he endorses having at 36 lb weight loss in the past 8 months that was unintentional.      Review of patient's allergies indicates:   Allergen Reactions    Asa [aspirin]      Past Medical History:   Diagnosis Date    Arthritis     Diabetes mellitus, type II     fasting bg criteria    Hyperlipidemia      Past Surgical History:   Procedure Laterality Date    TONSILLECTOMY       Family History   Problem Relation Age of Onset    Cancer Mother         unknown, met to spine    Hypertension Mother     Osteoarthritis Mother     Deep vein thrombosis Father     Heart attack Father     Pulmonary embolism Father     Emphysema Father     Arthritis Sister     No Known Problems Daughter     No Known Problems Son     Fibromyalgia Sister     Breast cancer Maternal Aunt     Heart disease Neg Hx      Prostate cancer Neg Hx     Colon cancer Neg Hx     Diabetes Neg Hx      Social History     Tobacco Use    Smoking status: Never    Smokeless tobacco: Never   Substance Use Topics    Alcohol use: Yes     Comment: Occasionally    Drug use: No     Review of Systems   Constitutional:  Negative for chills and fever.   HENT:  Negative for congestion and rhinorrhea.    Eyes:  Negative for pain.   Respiratory:  Negative for cough and shortness of breath.    Cardiovascular:  Negative for chest pain and leg swelling.   Gastrointestinal:  Negative for abdominal pain, nausea and vomiting.   Endocrine: Negative for polyuria.   Genitourinary:  Negative for dysuria and hematuria.   Musculoskeletal:  Positive for arthralgias. Negative for gait problem and neck pain.   Skin:  Negative for rash.   Allergic/Immunologic: Negative for immunocompromised state.   Neurological:  Positive for dizziness. Negative for weakness and headaches.     Physical Exam     Initial Vitals [10/26/22 2113]   BP Pulse Resp Temp SpO2   (!) 152/68 95 14 98.6 °F (37 °C) 98 %      MAP       --         Physical Exam    Constitutional: He appears well-developed and well-nourished. He is not diaphoretic. No distress.   HENT:   Head: Normocephalic and atraumatic.   Eyes: Conjunctivae and EOM are normal. Pupils are equal, round, and reactive to light.   Neck: No JVD present.   Normal range of motion.  Cardiovascular:  Normal rate and regular rhythm.           Pulses:       Radial pulses are 2+ on the right side and 2+ on the left side.        Posterior tibial pulses are 2+ on the right side and 2+ on the left side.   Pulmonary/Chest: Breath sounds normal. No respiratory distress.   Abdominal: Abdomen is soft. Bowel sounds are normal. He exhibits no distension. There is no abdominal tenderness. There is no rebound and no guarding.   Musculoskeletal:         General: No edema. Normal range of motion.      Right shoulder: Tenderness and bony tenderness present. No  crepitus.      Left shoulder: Normal.      Right upper arm: Tenderness present. No edema or deformity.      Left upper arm: Normal.      Right elbow: Normal.      Left elbow: Normal.      Cervical back: Normal range of motion.     Lymphadenopathy:     He has no cervical adenopathy.   Neurological: He is alert and oriented to person, place, and time.   Skin: Skin is warm. Capillary refill takes less than 2 seconds.   Psychiatric: He has a normal mood and affect.       ED Course   Procedures  Labs Reviewed   CBC WITHOUT DIFFERENTIAL - Abnormal; Notable for the following components:       Result Value    RBC 2.57 (*)     Hemoglobin 8.4 (*)     Hematocrit 25.7 (*)      (*)     MCH 32.7 (*)     RDW 15.8 (*)     Platelets 123 (*)     All other components within normal limits   COMPREHENSIVE METABOLIC PANEL - Abnormal; Notable for the following components:    Glucose 213 (*)     Albumin 3.1 (*)     Alkaline Phosphatase 525 (*)     All other components within normal limits          Imaging Results               X-Ray Humerus 2 View Right (Final result)  Result time 10/27/22 00:29:35      Final result by Lea Longo MD (10/27/22 00:29:35)                   Impression:      Pathologic fracture involving the proximal right humeral metadiaphysis as also noted on the right shoulder series.    This finding was recently discussed with Dr. Gonsales as noted on prior right shoulder report.    This report was flagged in Epic as abnormal.      Electronically signed by: Lea Longo  Date:    10/27/2022  Time:    00:29               Narrative:    EXAMINATION:  XR HUMERUS 2 VIEW RIGHT    CLINICAL HISTORY:  Pain in right arm    TECHNIQUE:  AP lateral views of the right humerus were obtained    COMPARISON:  Right shoulder series 10/26/2022    FINDINGS:  There is slight angulation of a complete transverse pathologic fracture involving the right proximal humeral metadiaphysis.  No other appreciable acute fracture of the right  humerus is seen and there is no dislocation at the shoulder.  Degenerative changes of the acromioclavicular and glenohumeral joints are noted.  Soft tissues grossly appear intact.                                       X-ray Shoulder 2 or More Views Right (Final result)  Result time 10/26/22 23:55:53   Procedure changed from X-Ray Shoulder Trauma Right     Final result by Elvis Aaron MD (10/26/22 23:55:53)                   Impression:      1.  Acute minimally displaced pathological fracture of the proximal metadiaphysis of the right humerus.    2.  Right basilar airspace opacity.    Case discussed with Dr. Gonsales on 10/26/2022 at 23:52.      Electronically signed by: Elvis Aaron MD  Date:    10/26/2022  Time:    23:55               Narrative:    EXAMINATION:  XR SHOULDER COMPLETE 2 OR MORE VIEWS RIGHT    CLINICAL HISTORY:  RIGHT SHOULDER PAIN;Pain in right shoulder    TECHNIQUE:  Two or three views of the right shoulder were performed.    COMPARISON:  Chest x-ray dated 02/22/2022.    FINDINGS:  There is abnormal mineralization with predominant lucent lesion involving the proximal metadiaphysis of the humerus.  The remainder of the bone mineralization is within normal limits.    There is an acute minimally displaced fracture at the level of the abnormal mineralization the right proximal humerus.  No additional fractures identified.    There is mild narrowing of the glenohumeral joint.  There is mild arthropathy of the right acromioclavicular joint.  The coracoclavicular interval is within normal limits.    There is a right basilar airspace opacity.  The remainder of the visualized right hemithorax is unremarkable.                                       Medications   oxyCODONE-acetaminophen 5-325 mg per tablet 1 tablet (1 tablet Oral Given 10/27/22 0008)     Medical Decision Making:   Initial Assessment:   77-year-old male history of type 2 diabetes, hyperlipidemia presents after a mechanical fall with right shoulder  pain.  Patient in mild distress secondary to pain.  Exam notable for tenderness to palpation of the right shoulder and proximal humerus.  Patient neurovascularly intact distally.  Will obtain x-rays of the right shoulder and humerus to assess for any osseous abnormalities.  Will give Percocet for pain.           ED Course as of 10/27/22 0140   Thu Oct 27, 2022   0029 Acute minimally displaced pathological fracture of the proximal metadiaphysis of the right humerus.  Will place patient in a sling and have patient follow-up with orthopedic surgery.  Given concern for pathological fracture with patient and with downtrending hemoglobin concern for possible malignancy.  Talked with patient extensively regarding need for close follow-up for will workup. [AS]      ED Course User Index  [AS] Barber Estrella MD          This dictation has been generated using M-Modal Fluency Direct dictation; some phonetic errors may occur.          Clinical Impression:   Final diagnoses:  [M25.511] Right shoulder pain  [M79.601] Right arm pain  [S42.294A] Other closed nondisplaced fracture of proximal end of right humerus, initial encounter (Primary)  [M84.421A] Pathological fracture of right humerus, unspecified pathological cause, initial encounter        ED Disposition Condition    Discharge Stable          ED Prescriptions       Medication Sig Dispense Start Date End Date Auth. Provider    oxyCODONE-acetaminophen (PERCOCET) 5-325 mg per tablet Take 1 tablet by mouth every 6 (six) hours as needed for Pain. 12 tablet 10/26/2022 -- Barber Estrella MD    methocarbamoL (ROBAXIN) 500 MG Tab Take 2 tablets (1,000 mg total) by mouth 3 (three) times daily. for 5 days 30 tablet 10/27/2022 11/1/2022 Barber Estrella MD          Follow-up Information       Follow up With Specialties Details Why Contact Info    Marc Lakhani MD Family Medicine Schedule an appointment as soon as possible for a visit in 1 day For further workup of possible  pathological fracture [address]  [address]  [place] [address]  [phone]      Bennett Hercules Jr., MD Hand Surgery, Orthopedic Surgery Schedule an appointment as soon as possible for a visit in 1 week For reassessment of fracture [address]  [place] [address]  [phone]               Barber Estrella MD  10/27/22 0140

## 2022-10-27 NOTE — DISCHARGE INSTRUCTIONS
Thank you for coming to our Emergency Department today. It is important to remember that some problems are difficult to diagnose and may not be found during your first visit. Be sure to follow up with your primary care doctor and review any labs/imaging that was performed with them. If you do not have a primary care doctor, you may contact the one listed on your discharge paperwork or you may also call the [facility] Appointment Desk at [phone] to schedule an appointment with one.     All medications may potentially have side effects and it is impossible to predict which medications may give you side effects. If you feel that you are having a negative effect of any medication you should immediately stop taking them and seek medical attention.    Return to the ER with any questions/concerns, new/concerning symptoms, worsening or failure to improve. Do not drive or make any important decisions for 24 hours if you have received any pain medications, sedatives or mood altering drugs during your ER visit.

## 2022-10-27 NOTE — FIRST PROVIDER EVALUATION
Medical screening examination initiated.  I have conducted a focused provider triage encounter, findings are as follows:    Brief history of present illness:  R shoulder pain worsened acutely this afternoon after tyring to lift himself from seated position.     There were no vitals filed for this visit.    Pertinent physical exam:  TTP R anterior shoulder, in sling, possible deformity, no TTP R humerus, elbow, forearm, wrist, or hand.    Endorses generalized weakness with history of anemia, scheduled for upper/lower endoscopy.     Brief workup plan:  xray R shoulder, labs    Preliminary workup initiated; this workup will be continued and followed by the physician or advanced practice provider that is assigned to the patient when roomed.

## 2022-10-27 NOTE — PATIENT INSTRUCTIONS
SUPREP Instructions    [facility]  [address] [place]  [address]    You are scheduled for a colonoscopy with Dr. Sandra on TBA at [facility] in [place], [place].  To ensure that your test is accurate and complete, you MUST follow these instructions listed below.  If you have any questions, please call our office at [phone].  Plan on being at the hospital for your procedure for 3-4 hours.    1.  Follow a CLEAR LIQUID DIET for the entire day before your scheduled colonoscopy.  This means no solid food the entire day starting when you wake.  You may have as much of the clear liquids as you want throughout the day.   CLEAR LIQUID DIET:   - Avoid Red, Orange, Purple, and/or Blue food coloring   - NO DAIRY   - You can have:  Coffee with sugar (no creamer), tea, water, soda, apple or white grape juice, chicken or beef broth/bouillon (no meat, noodles, or veggies), green/yellow popsicles, green/yellow Jell-O, lemonade.    2.  AT 5 pm the evening before your colonoscopy, POUR ONE (1) BOTTLE OF SUPREP INTO THE MIXING CONTAINER, PROVIDED INSIDE THE BOX.  ADD WATER TO THE LINE ON THE CONTAINER AND MIX IT WELL.  DRINK THE ENTIRE CONTAINER AND THEN DRINK TWO (2) MORE CONTAINERS OF WATER OVER THE NEXT 1 HOUR.  This is sometimes easier to drink if this solution is cold, so you can mix the solution 20 minutes ahead of time and place in the refrigerator prior to drinking.  You have to drink the solution within 30-45 minutes of mixing it.  Do NOT put this solution over ice.  It IS ok to drink with a straw.    3.  The endoscopy department will call you 1 day before your colonoscopy to tell you the exact time to arrive, AND to tell you the exact time to drink the 2nd portion of your prep (which will be FIVE HOURS BEFORE YOUR ARRIVAL TIME).  At this time given to you, POUR ONE (1) BOTTLE OF SUPREP INTO THE MIXING CONTAINER, PROVIDED INSIDE THE BOX.  ADD WATER TO THE LINE ON THE CONTAINER AND  MIX IT WELL.  DRINK THE ENTIRE CONTAINER AND THEN DRINK TWO (2) MORE CONTAINERS OF WATER OVER THE NEXT 1 HOUR.  This is sometimes easier to drink if this solution is cold, so you can mix the solution 20 minutes ahead of time and place in the refrigerator prior to drinking.  You have to drink the solution within 30-45 minutes of mixing it.  Do NOT put this solution over ice.  It IS ok to drink with a straw.  Once this is complete, you may not have ANYTHING else by mouth!    4.  You must have someone with you to DRIVE YOU HOME since you will be receiving IV sedation for the colonoscopy.    5.  It is ok to take MOST of your REGULAR MEDICATIONS  in the morning of your test with a SIP of water.  THE ONLY MEDS YOU NEED TO HOLD ARE YOUR DIABETES MEDICATIONS,  SOME BLOOD PRESSURE MEDS, AND BLOOD THINNERS IF OK'D BY YOUR DOCTOR.  Do NOT have anything else to eat or drink the morning of your colonoscopy.  It is ok to brush your teeth.    6.  If you are on blood thinners THAT YOU HAVE BEEN INSTRUCTED TO HOLD BY YOUR DOCTOR FOR THIS PROCEDURE, then do NOT take this the morning of your colonoscopy.  Do NOT stop these medications on your own, they must be approved to be held by your doctor.  Your colonoscopy can NOT be done if you are on these medications.  Examples of blood thinners include: Coumadin, Aggrenox, Plavix, Pradaxa, Reapro, Pletal, Xarelto, Ticagrelor, Brilinta, Eliquis, and high dose aspirin (325 mg).  You do not have to stop baby aspirin 81 mg.    7.  IF YOU ARE DIABETIC:  NO INSULIN OR ORAL MEDICATIONS THE MORNING OF THE COLONOSCOPY.  TAKE ONLY HALF THE DOSE OF YOUR INSULIN THE DAY BEFORE THE COLONOSCOPY.  DO NOT TAKE ANY ORAL DIABETIC MEDICATIONS THE DAY BEFORE THE COLONOSCOPY.  IF YOU ARE AN INSULIN DEPENDENT DIABETIC WITH UNSTABLE BLOOD SUGARS, NOTIFY YOUR PRIMARY CARE PHYSICIAN FOR INSTRUCTIONS.    You will receive a call a few days before your colonoscopy to tell you the time to arrive.  If you have not  received a call by the day before your procedure, call the Pre-op Coordinator at [phone].

## 2022-10-28 ENCOUNTER — TELEPHONE (OUTPATIENT)
Dept: GASTROENTEROLOGY | Facility: CLINIC | Age: 77
End: 2022-10-28
Payer: MEDICARE

## 2022-10-28 ENCOUNTER — TELEPHONE (OUTPATIENT)
Dept: HEMATOLOGY/ONCOLOGY | Facility: CLINIC | Age: 77
End: 2022-10-28
Payer: MEDICARE

## 2022-10-28 ENCOUNTER — OFFICE VISIT (OUTPATIENT)
Dept: FAMILY MEDICINE | Facility: CLINIC | Age: 77
End: 2022-10-28
Payer: MEDICARE

## 2022-10-28 ENCOUNTER — TELEPHONE (OUTPATIENT)
Dept: FAMILY MEDICINE | Facility: CLINIC | Age: 77
End: 2022-10-28

## 2022-10-28 VITALS
HEART RATE: 108 BPM | WEIGHT: 171.31 LBS | TEMPERATURE: 98 F | BODY MASS INDEX: 21.98 KG/M2 | HEIGHT: 74 IN | OXYGEN SATURATION: 96 % | DIASTOLIC BLOOD PRESSURE: 70 MMHG | SYSTOLIC BLOOD PRESSURE: 133 MMHG

## 2022-10-28 DIAGNOSIS — R22.42 LOCALIZED SWELLING, MASS AND LUMP, LEFT LOWER LIMB: ICD-10-CM

## 2022-10-28 DIAGNOSIS — M84.421A PATHOLOGICAL FRACTURE OF RIGHT HUMERUS, UNSPECIFIED PATHOLOGICAL CAUSE, INITIAL ENCOUNTER: ICD-10-CM

## 2022-10-28 DIAGNOSIS — D50.9 IRON DEFICIENCY ANEMIA, UNSPECIFIED IRON DEFICIENCY ANEMIA TYPE: ICD-10-CM

## 2022-10-28 DIAGNOSIS — M79.89 LEFT LEG SWELLING: Primary | ICD-10-CM

## 2022-10-28 PROCEDURE — 99215 PR OFFICE/OUTPT VISIT, EST, LEVL V, 40-54 MIN: ICD-10-PCS | Mod: S$GLB,,, | Performed by: FAMILY MEDICINE

## 2022-10-28 PROCEDURE — 1101F PT FALLS ASSESS-DOCD LE1/YR: CPT | Mod: CPTII,S$GLB,, | Performed by: FAMILY MEDICINE

## 2022-10-28 PROCEDURE — 99999 PR PBB SHADOW E&M-EST. PATIENT-LVL IV: CPT | Mod: PBBFAC,,, | Performed by: FAMILY MEDICINE

## 2022-10-28 PROCEDURE — 3075F PR MOST RECENT SYSTOLIC BLOOD PRESS GE 130-139MM HG: ICD-10-PCS | Mod: CPTII,S$GLB,, | Performed by: FAMILY MEDICINE

## 2022-10-28 PROCEDURE — 99215 OFFICE O/P EST HI 40 MIN: CPT | Mod: S$GLB,,, | Performed by: FAMILY MEDICINE

## 2022-10-28 PROCEDURE — 3078F PR MOST RECENT DIASTOLIC BLOOD PRESSURE < 80 MM HG: ICD-10-PCS | Mod: CPTII,S$GLB,, | Performed by: FAMILY MEDICINE

## 2022-10-28 PROCEDURE — 3288F FALL RISK ASSESSMENT DOCD: CPT | Mod: CPTII,S$GLB,, | Performed by: FAMILY MEDICINE

## 2022-10-28 PROCEDURE — 99999 PR PBB SHADOW E&M-EST. PATIENT-LVL IV: ICD-10-PCS | Mod: PBBFAC,,, | Performed by: FAMILY MEDICINE

## 2022-10-28 PROCEDURE — 1101F PR PT FALLS ASSESS DOC 0-1 FALLS W/OUT INJ PAST YR: ICD-10-PCS | Mod: CPTII,S$GLB,, | Performed by: FAMILY MEDICINE

## 2022-10-28 PROCEDURE — 1126F PR PAIN SEVERITY QUANTIFIED, NO PAIN PRESENT: ICD-10-PCS | Mod: CPTII,S$GLB,, | Performed by: FAMILY MEDICINE

## 2022-10-28 PROCEDURE — 3288F PR FALLS RISK ASSESSMENT DOCUMENTED: ICD-10-PCS | Mod: CPTII,S$GLB,, | Performed by: FAMILY MEDICINE

## 2022-10-28 PROCEDURE — 3078F DIAST BP <80 MM HG: CPT | Mod: CPTII,S$GLB,, | Performed by: FAMILY MEDICINE

## 2022-10-28 PROCEDURE — 1126F AMNT PAIN NOTED NONE PRSNT: CPT | Mod: CPTII,S$GLB,, | Performed by: FAMILY MEDICINE

## 2022-10-28 PROCEDURE — 3075F SYST BP GE 130 - 139MM HG: CPT | Mod: CPTII,S$GLB,, | Performed by: FAMILY MEDICINE

## 2022-10-28 NOTE — PROGRESS NOTES
(Portions of this note were dictated using voice recognition software and may contain dictation related errors in spelling/grammar/syntax not found on text review)    CC:   Chief Complaint   Patient presents with    broken arm     Er doctor did not like how the break looked wanted pt to see pcp     Extremity Weakness     Pt daughter stated that he has been having problems standing up and sitting down. Pt stated this was how he broke his arm       HPI: 77 y.o. male     Recent ED visit after a fall when trying to get up in the bathroom, fell on his right shoulder.  X-ray reviewed in the office with family and shows slightly angulated complete transverse fracture through proximal humeral shaft, appears pathologic with some lucency around fracture.  Currently in a sling, had orthopedic appointment outside orthopedics coming up next week on Thursday.  Was given hydrocodone for pain    Has had progressively worsening iron deficiency anemia discussed at prior visits, trends as below.  Concern for needing GI workup, had colonoscopy many years ago was hesitant to further workup.  At my last visit after much discussion he had agreed to GI workup.  Saw GI yesterday.  He states that plans for endoscopies were on hold secondary to his mobility issues with difficulty getting back and forth to the restroom that would be needed during prep procedure.  He is getting progressively weaker, more short of breath with activity.  Denies any fevers, chills, sweats, abdominal pain.  Does feel some constipation periodically.  Denies any blood in the stool.  No chest pain.  Does state that last week he had some leg swelling on the left which seem like it was getting a little bit better.  He denies any trauma.    Past Medical History:   Diagnosis Date    Arthritis     Diabetes mellitus, type II     fasting bg criteria    Hyperlipidemia        Past Surgical History:   Procedure Laterality Date    TONSILLECTOMY         Family History   Problem  "Relation Age of Onset    Cancer Mother         unknown, met to spine    Hypertension Mother     Osteoarthritis Mother     Deep vein thrombosis Father     Heart attack Father     Pulmonary embolism Father     Emphysema Father     Arthritis Sister     No Known Problems Daughter     No Known Problems Son     Fibromyalgia Sister     Breast cancer Maternal Aunt     Heart disease Neg Hx     Prostate cancer Neg Hx     Colon cancer Neg Hx     Diabetes Neg Hx        Social History     Tobacco Use    Smoking status: Never    Smokeless tobacco: Never   Substance Use Topics    Alcohol use: Yes     Comment: Occasionally    Drug use: No       Lab Results   Component Value Date    WBC 12.43 10/26/2022    HGB 8.4 (L) 10/26/2022    HCT 25.7 (L) 10/26/2022     (H) 10/26/2022     (L) 10/26/2022    CHOL 116 (L) 06/08/2022    TRIG 98 06/08/2022    HDL 33 (L) 06/08/2022    ALT 17 10/26/2022    AST 21 10/26/2022    BILITOT 0.5 10/26/2022    ALKPHOS 525 (H) 10/26/2022     10/26/2022    K 4.5 10/26/2022    CL 97 10/26/2022    CREATININE 0.8 10/26/2022    ESTGFRAFRICA >60.0 06/08/2022    EGFRNONAA >60.0 06/08/2022    EGFRNORACEVR >60 10/26/2022    CALCIUM 9.1 10/26/2022    ALBUMIN 3.1 (L) 10/26/2022    BUN 15 10/26/2022    CO2 27 10/26/2022    TSH 1.510 06/08/2022    HGBA1C 6.1 (H) 09/08/2022    MICROALBUR 0.6 06/05/2019    MICALBCREAT 6.3 06/08/2022    LDLCALC 63.4 06/08/2022     (H) 10/26/2022                 Vital signs reviewed  Vitals:    10/28/22 1119   BP: 133/70   BP Location: Left arm   Patient Position: Sitting   BP Method: Medium (Manual)   Pulse: 108   Temp: 98.2 °F (36.8 °C)   TempSrc: Oral   SpO2: 96%   Weight: 77.7 kg (171 lb 4.8 oz)   Height: 6' 2" (1.88 m)       Wt Readings from Last 4 Encounters:   10/28/22 77.7 kg (171 lb 4.8 oz)   10/27/22 77.1 kg (170 lb)   10/26/22 77.1 kg (170 lb)   09/15/22 80.3 kg (177 lb 0.5 oz)       PE:   APPEARANCE: Well nourished, well developed, in no acute distress, " appears fatigued  HEAD: Normocephalic, atraumatic.  CHEST: Good inspiratory effort. Lungs clear to auscultation with no wheezes or crackles.  CARDIOVASCULAR: Normal S1, S2. No rubs, murmurs, or gallops.  ABDOMEN: Bowel sounds normal. Not distended. Soft. No tenderness or masses. No organomegaly.  EXTREMITIES:  1+ edema left lower extremity mid calf to ankle, nontender, skin is very dry.      IMPRESSION  1. Left leg swelling    2. Pathological fracture of right humerus, unspecified pathological cause, initial encounter    3. Localized swelling, mass and lump, left lower limb    4. Iron deficiency anemia, unspecified iron deficiency anemia type            PLAN  Orders Placed This Encounter   Procedures    US Lower Extremity Veins Bilateral    Ambulatory referral/consult to Hematology / Oncology     Reviewed ED documentation.  Reviewed x-ray images.  Concern for pathologic fracture of humerus especially in light of context of progressively worsening anemia that has been discussed prior.  Saw GI yesterday but per patient, no immediate plans to endoscopy   (chart notes still pending).  Will consult Hematology Oncology, may need further imaging studies to assess for any focus of malignancy.  Can take OTC iron 65 mg OTC daily with MiraLax.  Concerned about left leg swelling that seemed to happen last week, better per patient but still swollen on exam.  Nontender.  In light of all the above I think it is important to get ultrasound to check for DVT.  He has follow-up orthopedic appointment coming up next week to follow-up on the arm fracture, can get CD from Radiology/medical records to show his orthopedist.           SCREENINGS (males >=65)     Immunizations:  tdap:   March 2019  Zoster:  Can get at pharmacy  PCV 2016  PVX 2018  Declines flu vaccine  COVID:  01/10/2021, 01/31/2021    Hepatitis-C screening up-to-date 2016     Has not had a colonoscopy in many years  Denies any prior prostate cancer screening

## 2022-10-28 NOTE — TELEPHONE ENCOUNTER
Patient is requesting that you review his x-rays from 10/26. However, he has an appointment with you late this morning.

## 2022-10-28 NOTE — TELEPHONE ENCOUNTER
----- Message from Bella Montoya sent at 10/28/2022  3:51 PM CDT -----  Patient has a referral placed by Dr. Lakhani, patient's diagnosis is     M84.421A (ICD-10-CM) - Pathological fracture of right humerus, unspecified pathological cause, initial encounter  D50.9 (ICD-10-CM) - Iron deficiency anemia, unspecified iron deficiency anemia type    Dr. Lakhani is asking if patient can be scheduled ASAP please, thank you

## 2022-10-28 NOTE — TELEPHONE ENCOUNTER
----- Message from Kavita Greene MA sent at 10/27/2022  4:42 PM CDT -----  Contact: Pt    ----- Message -----  From: Fede Henry  Sent: 10/27/2022  10:11 AM CDT  To: [facility] Staff    .Type: Needs Medical Advice    Who Called: Pt's Wife  Would the patient rather a call back or a response via [facility]? Call  Best Call Back Number: [phone]/ [phone]   Additional Information:   Pt's wife wants Dr. Lakhani to look at X-Ray.  Pt's wife would like for Dr. Lakhani to call pt back.

## 2022-10-28 NOTE — PROGRESS NOTES
Subjective:       Patient ID: Joi Castellanos is a 77 y.o. male.    Chief Complaint: Anemia    78 y/o male with hypertension, type 2 diabetes, and right humerus fracture s/p fall 10/26 referred by PCP for anemia. Significant decrease in hgb and hct (see results below) over the past 6 months. Patient denies overt signs of GIB. Last colonoscopy over 10 years ago but does not recall results.     Anemia  Presents for initial visit. Symptoms include malaise/fatigue and weight loss. There has been no abdominal pain or bruising/bleeding easily. Signs of blood loss that are not present include hematemesis, hematochezia and melena. Past treatments include nothing. There is no history of alcohol abuse, chronic liver disease, inflammatory bowel disease or recent surgery.     Past Medical History:   Diagnosis Date    Arthritis     Diabetes mellitus, type II     fasting bg criteria    Hyperlipidemia        Past Surgical History:   Procedure Laterality Date    TONSILLECTOMY         Family History   Problem Relation Age of Onset    Cancer Mother         unknown, met to spine    Hypertension Mother     Osteoarthritis Mother     Deep vein thrombosis Father     Heart attack Father     Pulmonary embolism Father     Emphysema Father     Arthritis Sister     No Known Problems Daughter     No Known Problems Son     Fibromyalgia Sister     Breast cancer Maternal Aunt     Heart disease Neg Hx     Prostate cancer Neg Hx     Colon cancer Neg Hx     Diabetes Neg Hx        Social History     Socioeconomic History    Marital status:    Tobacco Use    Smoking status: Never    Smokeless tobacco: Never   Substance and Sexual Activity    Alcohol use: Yes     Comment: Occasionally    Drug use: No    Sexual activity: Yes     Partners: Female   Social History Narrative    Lives in [place] with his wife. He is retired from [employer]. [place] 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  2 daughters with marital and family problems. One of them  "is  and remarried because her  has a serious anger problems. His other son-in-law is in treatment for drug and alcohol problems. He has 5 grand children. He was in the [employer]. He goes to the gym. His son loves to hunt. He bought a small RV       Review of Systems   Constitutional:  Positive for appetite change, fatigue, malaise/fatigue, unexpected weight change and weight loss.   HENT:  Negative for trouble swallowing.    Respiratory:  Positive for shortness of breath (DORMAN).    Cardiovascular:  Negative for chest pain.   Gastrointestinal:  Negative for abdominal pain, blood in stool, constipation, diarrhea, hematemesis, hematochezia and melena.   Genitourinary:  Negative for hematuria.   Hematological:  Does not bruise/bleed easily.   Psychiatric/Behavioral:  Negative for dysphoric mood.        Objective:     Vitals:    10/27/22 1310   BP: 135/62   BP Location: Left arm   Patient Position: Sitting   BP Method: Large (Automatic)   Pulse: 94   Weight: 77.1 kg (170 lb)   Height: 6' 2" (1.88 m)     Component      Latest Ref Rng & Units 10/26/2022 9/8/2022 6/8/2022   Eos #      0.0 - 0.5 K/uL   0.1   Baso #      0.00 - 0.20 K/uL   0.02   nRBC      0 /100 WBC   0   Gran %      38.0 - 73.0 %   41.9   Lymph %      18.0 - 48.0 %   48.2 (H)   Mono %      4.0 - 15.0 %   7.1   Eosinophil %      0.0 - 8.0 %   1.8   Basophil %      0.0 - 1.9 %   0.5   Differential Method         Automated   Sodium      136 - 145 mmol/L 138  139   Potassium      3.5 - 5.1 mmol/L 4.5  4.5   Chloride      95 - 110 mmol/L 97  104   CO2      23 - 29 mmol/L 27  30 (H)   Glucose      70 - 110 mg/dL 213 (H)  155 (H)   BUN      8 - 23 mg/dL 15  16   Creatinine      0.5 - 1.4 mg/dL 0.8  0.93   Calcium      8.7 - 10.5 mg/dL 9.1  9.2   PROTEIN TOTAL      6.0 - 8.4 g/dL 7.7  8.4   Albumin      3.5 - 5.2 g/dL 3.1 (L)  4.5   BILIRUBIN TOTAL      0.1 - 1.0 mg/dL 0.5  0.6   Alkaline Phosphatase      55 - 135 U/L 525 (H)  90   AST      10 - 40 " U/L 21  22   ALT      10 - 44 U/L 17  19   Anion Gap      8 - 16 mmol/L 14  5 (L)   eGFR if African American      >60 mL/min/1.73 m:2   >60.0   eGFR if non African American      >60 mL/min/1.73 m:2   >60.0   eGFR      >60 mL/min/1.73 m:2 >60     Iron      45 - 160 ug/dL  42 (L) 82   Transferrin      200 - 375 mg/dL  227 247   TIBC      250 - 450 ug/dL  336 366   Saturated Iron      20 - 50 %  13 (L) 22   Ferritin      20.0 - 300.0 ng/mL  1,910 (H) 904 (H)   Folate      4.0 - 24.0 ng/mL  14.9    Vitamin B-12      210 - 950 pg/mL  >2000 (H)    Retic      0.4 - 2.0 %  2.4 (H)           Physical Exam  Constitutional:       General: He is not in acute distress.     Appearance: He is ill-appearing.   HENT:      Head: Normocephalic.   Eyes:      Conjunctiva/sclera: Conjunctivae normal.      Pupils: Pupils are equal, round, and reactive to light.   Pulmonary:      Effort: Pulmonary effort is normal. No respiratory distress.   Musculoskeletal:      Cervical back: Normal range of motion.      Comments: In wheelchair; RUE in sling   Skin:     General: Skin is warm and dry.   Neurological:      Mental Status: He is alert and oriented to person, place, and time.   Psychiatric:         Mood and Affect: Mood normal.         Behavior: Behavior normal.             Assessment:         ICD-10-CM ICD-9-CM   1. Iron deficiency anemia, unspecified iron deficiency anemia type  D50.9 280.9       Plan:       Iron deficiency anemia, unspecified iron deficiency anemia type  -     Recommend upper and lower endoscopy. Patient declined to schedule citing mobility issues and concerns about bowel prep. Will follow up after ortho eval for RUE fracture. Advised to start OTC iron supplement BID.     Follow up if symptoms worsen or fail to improve.     Patient's Medications   New Prescriptions    No medications on file   Previous Medications    CO-ENZYME Q-10 30 MG CAPSULE        FLUTICASONE PROPIONATE (FLONASE) 50 MCG/ACTUATION NASAL SPRAY    2  sprays (100 mcg total) by Each Nostril route once daily.    LOSARTAN (COZAAR) 100 MG TABLET    TAKE 1 TABLET BY MOUTH EVERY DAY    METHOCARBAMOL (ROBAXIN) 500 MG TAB    Take 2 tablets (1,000 mg total) by mouth 3 (three) times daily. for 5 days    MULTIVIT &MINERALS/FERROUS FUM (MULTI VITAMIN ORAL)    Take by mouth once daily.    OXYCODONE-ACETAMINOPHEN (PERCOCET) 5-325 MG PER TABLET    Take 1 tablet by mouth every 6 (six) hours as needed for Pain.    ROSUVASTATIN (CRESTOR) 20 MG TABLET    TAKE 1 TABLET BY MOUTH EVERY DAY    VITAMIN D (VITAMIN D3) 1000 UNITS TAB    Take 1,000 Units by mouth once daily.    ZINC GLUCONATE 50 MG TABLET    Take 50 mg by mouth once daily. PRN   Modified Medications    No medications on file   Discontinued Medications    No medications on file

## 2022-10-28 NOTE — TELEPHONE ENCOUNTER
Spoke with patient and pt's wife, Cally, via telephone to schedule endoscopy. Offered first available dates, 11/9 or 11/10. Patient declined to schedule citing mobility issues and several upcoming appts. Has pending f/u with orthopedic for broken arm. PCP also referred to hem/onc. Patient will call back to schedule.

## 2022-10-31 ENCOUNTER — HOSPITAL ENCOUNTER (OUTPATIENT)
Dept: RADIOLOGY | Facility: HOSPITAL | Age: 77
Discharge: HOME | End: 2022-10-31
Attending: FAMILY MEDICINE
Payer: MEDICARE

## 2022-10-31 ENCOUNTER — HOSPITAL ENCOUNTER (OUTPATIENT)
Facility: HOSPITAL | Age: 77
Discharge: HOME HEALTH | End: 2022-11-02
Attending: EMERGENCY MEDICINE | Admitting: HOSPITALIST
Payer: MEDICARE

## 2022-10-31 DIAGNOSIS — D62 ACUTE BLOOD LOSS ANEMIA: ICD-10-CM

## 2022-10-31 DIAGNOSIS — R07.9 CHEST PAIN: ICD-10-CM

## 2022-10-31 DIAGNOSIS — I82.413 ACUTE DEEP VEIN THROMBOSIS (DVT) OF FEMORAL VEIN OF BOTH LOWER EXTREMITIES: ICD-10-CM

## 2022-10-31 DIAGNOSIS — R22.42 LOCALIZED SWELLING, MASS AND LUMP, LEFT LOWER LIMB: ICD-10-CM

## 2022-10-31 DIAGNOSIS — M89.9 BONE LESION: ICD-10-CM

## 2022-10-31 DIAGNOSIS — M79.89 LEFT LEG SWELLING: ICD-10-CM

## 2022-10-31 DIAGNOSIS — M84.521A PATHOLOGICAL FRACTURE OF RIGHT HUMERUS DUE TO NEOPLASTIC DISEASE, INITIAL ENCOUNTER: ICD-10-CM

## 2022-10-31 DIAGNOSIS — R91.8 MASS OF RIGHT LUNG: ICD-10-CM

## 2022-10-31 DIAGNOSIS — I82.409 DVT (DEEP VENOUS THROMBOSIS): ICD-10-CM

## 2022-10-31 DIAGNOSIS — S42.291D OTHER CLOSED DISPLACED FRACTURE OF PROXIMAL END OF RIGHT HUMERUS WITH ROUTINE HEALING, SUBSEQUENT ENCOUNTER: ICD-10-CM

## 2022-10-31 DIAGNOSIS — S42.294S OTHER CLOSED NONDISPLACED FRACTURE OF PROXIMAL END OF RIGHT HUMERUS, SEQUELA: ICD-10-CM

## 2022-10-31 DIAGNOSIS — J90 PLEURAL EFFUSION ON RIGHT: ICD-10-CM

## 2022-10-31 DIAGNOSIS — R53.1 WEAKNESS: ICD-10-CM

## 2022-10-31 DIAGNOSIS — C79.9 METASTATIC MALIGNANT NEOPLASM, UNSPECIFIED SITE: ICD-10-CM

## 2022-10-31 DIAGNOSIS — I82.4Y2 ACUTE DEEP VEIN THROMBOSIS (DVT) OF PROXIMAL VEIN OF LEFT LOWER EXTREMITY: Primary | ICD-10-CM

## 2022-10-31 DIAGNOSIS — R91.8 HILAR MASS: ICD-10-CM

## 2022-10-31 DIAGNOSIS — C79.51 BONE METASTASES: ICD-10-CM

## 2022-10-31 PROBLEM — D63.8 ANEMIA OF CHRONIC DISEASE: Status: ACTIVE | Noted: 2022-10-31

## 2022-10-31 PROBLEM — D50.9 IRON DEFICIENCY ANEMIA: Status: ACTIVE | Noted: 2022-10-31

## 2022-10-31 PROBLEM — S42.301A FRACTURE OF RIGHT HUMERUS: Status: ACTIVE | Noted: 2022-10-31

## 2022-10-31 PROBLEM — M54.50 LUMBAR PAIN: Status: ACTIVE | Noted: 2022-10-31

## 2022-10-31 PROBLEM — H61.21 IMPACTED CERUMEN OF RIGHT EAR: Status: ACTIVE | Noted: 2022-10-31

## 2022-10-31 LAB
ALBUMIN SERPL BCP-MCNC: 3 G/DL (ref 3.5–5.2)
ALP SERPL-CCNC: 516 U/L (ref 55–135)
ALT SERPL W/O P-5'-P-CCNC: 33 U/L (ref 10–44)
ANION GAP SERPL CALC-SCNC: 14 MMOL/L (ref 8–16)
ANISOCYTOSIS BLD QL SMEAR: SLIGHT
APTT BLDCRRT: 30.8 SEC (ref 21–32)
AST SERPL-CCNC: 34 U/L (ref 10–40)
BASOPHILS # BLD AUTO: ABNORMAL K/UL (ref 0–0.2)
BASOPHILS NFR BLD: 0 % (ref 0–1.9)
BILIRUB SERPL-MCNC: 0.6 MG/DL (ref 0.1–1)
BILIRUB UR QL STRIP: NEGATIVE
BUN SERPL-MCNC: 15 MG/DL (ref 8–23)
CALCIUM SERPL-MCNC: 9.3 MG/DL (ref 8.7–10.5)
CHLORIDE SERPL-SCNC: 95 MMOL/L (ref 95–110)
CLARITY UR: CLEAR
CO2 SERPL-SCNC: 24 MMOL/L (ref 23–29)
COLOR UR: YELLOW
COMPLEXED PSA SERPL-MCNC: 0.79 NG/ML (ref 0–4)
CREAT SERPL-MCNC: 0.7 MG/DL (ref 0.5–1.4)
D DIMER PPP IA.FEU-MCNC: 23.29 MG/L FEU
DIFFERENTIAL METHOD: ABNORMAL
EOSINOPHIL # BLD AUTO: ABNORMAL K/UL (ref 0–0.5)
EOSINOPHIL NFR BLD: 0 % (ref 0–8)
ERYTHROCYTE [DISTWIDTH] IN BLOOD BY AUTOMATED COUNT: 16.6 % (ref 11.5–14.5)
EST. GFR  (NO RACE VARIABLE): >60 ML/MIN/1.73 M^2
FIBRINOGEN PPP-MCNC: 438 MG/DL (ref 182–400)
GLUCOSE SERPL-MCNC: 136 MG/DL (ref 70–110)
GLUCOSE UR QL STRIP: NEGATIVE
HCT VFR BLD AUTO: 25.9 % (ref 40–54)
HGB BLD-MCNC: 8.3 G/DL (ref 14–18)
HGB UR QL STRIP: NEGATIVE
HYPOCHROMIA BLD QL SMEAR: ABNORMAL
IMM GRANULOCYTES # BLD AUTO: ABNORMAL K/UL (ref 0–0.04)
IMM GRANULOCYTES NFR BLD AUTO: ABNORMAL % (ref 0–0.5)
INR PPP: 1.2 (ref 0.8–1.2)
KETONES UR QL STRIP: NEGATIVE
LEUKOCYTE ESTERASE UR QL STRIP: NEGATIVE
LYMPHOCYTES # BLD AUTO: ABNORMAL K/UL (ref 1–4.8)
LYMPHOCYTES NFR BLD: 17 % (ref 18–48)
MCH RBC QN AUTO: 32.8 PG (ref 27–31)
MCHC RBC AUTO-ENTMCNC: 32 G/DL (ref 32–36)
MCV RBC AUTO: 102 FL (ref 82–98)
MONOCYTES # BLD AUTO: ABNORMAL K/UL (ref 0.3–1)
MONOCYTES NFR BLD: 0 % (ref 4–15)
NEUTROPHILS NFR BLD: 83 % (ref 38–73)
NITRITE UR QL STRIP: NEGATIVE
NRBC BLD-RTO: 0 /100 WBC
OVALOCYTES BLD QL SMEAR: ABNORMAL
PH UR STRIP: 6 [PH] (ref 5–8)
PLATELET # BLD AUTO: 116 K/UL (ref 150–450)
PLATELET BLD QL SMEAR: ABNORMAL
PMV BLD AUTO: 9.8 FL (ref 9.2–12.9)
POCT GLUCOSE: 127 MG/DL (ref 70–110)
POCT GLUCOSE: 150 MG/DL (ref 70–110)
POLYCHROMASIA BLD QL SMEAR: ABNORMAL
POTASSIUM SERPL-SCNC: 4.6 MMOL/L (ref 3.5–5.1)
PROT SERPL-MCNC: 7.9 G/DL (ref 6–8.4)
PROT UR QL STRIP: ABNORMAL
PROTHROMBIN TIME: 12 SEC (ref 9–12.5)
RBC # BLD AUTO: 2.53 M/UL (ref 4.6–6.2)
SODIUM SERPL-SCNC: 133 MMOL/L (ref 136–145)
SP GR UR STRIP: 1.01 (ref 1–1.03)
URN SPEC COLLECT METH UR: ABNORMAL
UROBILINOGEN UR STRIP-ACNC: NEGATIVE EU/DL
WBC # BLD AUTO: 13.65 K/UL (ref 3.9–12.7)

## 2022-10-31 PROCEDURE — 82784 ASSAY IGA/IGD/IGG/IGM EACH: CPT | Performed by: INTERNAL MEDICINE

## 2022-10-31 PROCEDURE — 93010 ELECTROCARDIOGRAM REPORT: CPT | Mod: ,,, | Performed by: INTERNAL MEDICINE

## 2022-10-31 PROCEDURE — 96372 THER/PROPH/DIAG INJ SC/IM: CPT | Performed by: NURSE PRACTITIONER

## 2022-10-31 PROCEDURE — 63600175 PHARM REV CODE 636 W HCPCS: Performed by: NURSE PRACTITIONER

## 2022-10-31 PROCEDURE — 85384 FIBRINOGEN ACTIVITY: CPT | Performed by: NURSE PRACTITIONER

## 2022-10-31 PROCEDURE — 85303 CLOT INHIBIT PROT C ACTIVITY: CPT | Performed by: NURSE PRACTITIONER

## 2022-10-31 PROCEDURE — G0378 HOSPITAL OBSERVATION PER HR: HCPCS

## 2022-10-31 PROCEDURE — 93970 US LOWER EXTREMITY VEINS BILATERAL: ICD-10-PCS | Mod: 26,,, | Performed by: RADIOLOGY

## 2022-10-31 PROCEDURE — 85379 FIBRIN DEGRADATION QUANT: CPT | Performed by: NURSE PRACTITIONER

## 2022-10-31 PROCEDURE — 81003 URINALYSIS AUTO W/O SCOPE: CPT | Performed by: NURSE PRACTITIONER

## 2022-10-31 PROCEDURE — 25000003 PHARM REV CODE 250: Performed by: NURSE PRACTITIONER

## 2022-10-31 PROCEDURE — 25500020 PHARM REV CODE 255: Performed by: HOSPITALIST

## 2022-10-31 PROCEDURE — 84165 PROTEIN E-PHORESIS SERUM: CPT | Mod: 26,,, | Performed by: PATHOLOGY

## 2022-10-31 PROCEDURE — 80053 COMPREHEN METABOLIC PANEL: CPT | Performed by: NURSE PRACTITIONER

## 2022-10-31 PROCEDURE — 86334 IMMUNOFIX E-PHORESIS SERUM: CPT | Mod: 26,,, | Performed by: PATHOLOGY

## 2022-10-31 PROCEDURE — 86334 PATHOLOGIST INTERPRETATION IFE: ICD-10-PCS | Mod: 26,,, | Performed by: PATHOLOGY

## 2022-10-31 PROCEDURE — 84165 PROTEIN E-PHORESIS SERUM: CPT | Performed by: INTERNAL MEDICINE

## 2022-10-31 PROCEDURE — 83521 IG LIGHT CHAINS FREE EACH: CPT | Mod: 59 | Performed by: INTERNAL MEDICINE

## 2022-10-31 PROCEDURE — 84153 ASSAY OF PSA TOTAL: CPT | Performed by: INTERNAL MEDICINE

## 2022-10-31 PROCEDURE — 99285 EMERGENCY DEPT VISIT HI MDM: CPT | Mod: 25

## 2022-10-31 PROCEDURE — 85300 ANTITHROMBIN III ACTIVITY: CPT | Performed by: NURSE PRACTITIONER

## 2022-10-31 PROCEDURE — 85027 COMPLETE CBC AUTOMATED: CPT | Performed by: NURSE PRACTITIONER

## 2022-10-31 PROCEDURE — 85305 CLOT INHIBIT PROT S TOTAL: CPT | Performed by: NURSE PRACTITIONER

## 2022-10-31 PROCEDURE — 93005 ELECTROCARDIOGRAM TRACING: CPT

## 2022-10-31 PROCEDURE — 93970 EXTREMITY STUDY: CPT | Mod: TC

## 2022-10-31 PROCEDURE — 84165 PATHOLOGIST INTERPRETATION SPE: ICD-10-PCS | Mod: 26,,, | Performed by: PATHOLOGY

## 2022-10-31 PROCEDURE — 86334 IMMUNOFIX E-PHORESIS SERUM: CPT | Performed by: INTERNAL MEDICINE

## 2022-10-31 PROCEDURE — 99204 PR OFFICE/OUTPT VISIT, NEW, LEVL IV, 45-59 MIN: ICD-10-PCS | Mod: ,,, | Performed by: INTERNAL MEDICINE

## 2022-10-31 PROCEDURE — 85007 BL SMEAR W/DIFF WBC COUNT: CPT | Performed by: NURSE PRACTITIONER

## 2022-10-31 PROCEDURE — 82962 GLUCOSE BLOOD TEST: CPT

## 2022-10-31 PROCEDURE — 85610 PROTHROMBIN TIME: CPT | Performed by: NURSE PRACTITIONER

## 2022-10-31 PROCEDURE — 93970 EXTREMITY STUDY: CPT | Mod: 26,,, | Performed by: RADIOLOGY

## 2022-10-31 PROCEDURE — 93010 EKG 12-LEAD: ICD-10-PCS | Mod: ,,, | Performed by: INTERNAL MEDICINE

## 2022-10-31 PROCEDURE — 85730 THROMBOPLASTIN TIME PARTIAL: CPT | Performed by: NURSE PRACTITIONER

## 2022-10-31 PROCEDURE — 99204 OFFICE O/P NEW MOD 45 MIN: CPT | Mod: ,,, | Performed by: INTERNAL MEDICINE

## 2022-10-31 RX ORDER — GLUCAGON 1 MG
1 KIT INJECTION
Status: DISCONTINUED | OUTPATIENT
Start: 2022-10-31 | End: 2022-11-02 | Stop reason: HOSPADM

## 2022-10-31 RX ORDER — ACETAMINOPHEN 325 MG/1
650 TABLET ORAL EVERY 8 HOURS PRN
Status: DISCONTINUED | OUTPATIENT
Start: 2022-10-31 | End: 2022-11-02 | Stop reason: HOSPADM

## 2022-10-31 RX ORDER — ONDANSETRON 2 MG/ML
4 INJECTION INTRAMUSCULAR; INTRAVENOUS EVERY 8 HOURS PRN
Status: DISCONTINUED | OUTPATIENT
Start: 2022-10-31 | End: 2022-11-02 | Stop reason: HOSPADM

## 2022-10-31 RX ORDER — HYDROCODONE BITARTRATE AND ACETAMINOPHEN 5; 325 MG/1; MG/1
1 TABLET ORAL EVERY 6 HOURS PRN
Status: DISCONTINUED | OUTPATIENT
Start: 2022-10-31 | End: 2022-11-02 | Stop reason: HOSPADM

## 2022-10-31 RX ORDER — ACETAMINOPHEN 500 MG
500 TABLET ORAL EVERY 6 HOURS PRN
COMMUNITY
End: 2022-11-15 | Stop reason: SDUPTHER

## 2022-10-31 RX ORDER — DOCUSATE SODIUM 50 MG/5ML
100 LIQUID ORAL ONCE
Status: COMPLETED | OUTPATIENT
Start: 2022-10-31 | End: 2022-10-31

## 2022-10-31 RX ORDER — SODIUM CHLORIDE 0.9 % (FLUSH) 0.9 %
10 SYRINGE (ML) INJECTION EVERY 12 HOURS PRN
Status: DISCONTINUED | OUTPATIENT
Start: 2022-10-31 | End: 2022-11-02 | Stop reason: HOSPADM

## 2022-10-31 RX ORDER — IBUPROFEN 200 MG
24 TABLET ORAL
Status: DISCONTINUED | OUTPATIENT
Start: 2022-10-31 | End: 2022-11-02 | Stop reason: HOSPADM

## 2022-10-31 RX ORDER — LOSARTAN POTASSIUM 50 MG/1
100 TABLET ORAL DAILY
Status: DISCONTINUED | OUTPATIENT
Start: 2022-10-31 | End: 2022-11-02 | Stop reason: HOSPADM

## 2022-10-31 RX ORDER — ATORVASTATIN CALCIUM 40 MG/1
80 TABLET, FILM COATED ORAL NIGHTLY
Status: DISCONTINUED | OUTPATIENT
Start: 2022-10-31 | End: 2022-11-02 | Stop reason: HOSPADM

## 2022-10-31 RX ORDER — ENOXAPARIN SODIUM 100 MG/ML
1 INJECTION SUBCUTANEOUS EVERY 12 HOURS
Status: DISCONTINUED | OUTPATIENT
Start: 2022-10-31 | End: 2022-11-01

## 2022-10-31 RX ORDER — NALOXONE HCL 0.4 MG/ML
0.02 VIAL (ML) INJECTION
Status: DISCONTINUED | OUTPATIENT
Start: 2022-10-31 | End: 2022-11-02 | Stop reason: HOSPADM

## 2022-10-31 RX ORDER — INSULIN ASPART 100 [IU]/ML
0-5 INJECTION, SOLUTION INTRAVENOUS; SUBCUTANEOUS
Status: DISCONTINUED | OUTPATIENT
Start: 2022-10-31 | End: 2022-11-02 | Stop reason: HOSPADM

## 2022-10-31 RX ORDER — IBUPROFEN 200 MG
16 TABLET ORAL
Status: DISCONTINUED | OUTPATIENT
Start: 2022-10-31 | End: 2022-11-02 | Stop reason: HOSPADM

## 2022-10-31 RX ADMIN — IOHEXOL 75 ML: 350 INJECTION, SOLUTION INTRAVENOUS at 07:10

## 2022-10-31 RX ADMIN — IOHEXOL 30 ML: 350 INJECTION, SOLUTION INTRAVENOUS at 06:10

## 2022-10-31 RX ADMIN — ENOXAPARIN SODIUM 80 MG: 100 INJECTION SUBCUTANEOUS at 09:10

## 2022-10-31 RX ADMIN — DOCUSATE SODIUM 100 MG: 50 LIQUID ORAL at 04:10

## 2022-10-31 RX ADMIN — ATORVASTATIN CALCIUM 80 MG: 40 TABLET, FILM COATED ORAL at 09:10

## 2022-10-31 RX ADMIN — LOSARTAN POTASSIUM 100 MG: 50 TABLET, FILM COATED ORAL at 04:10

## 2022-10-31 NOTE — ASSESSMENT & PLAN NOTE
Patient has a current diagnosis of HTN which is uncontrolled.  Latest blood pressure and vitals reviewed-   Temp:  [97.6 °F (36.4 °C)]   Pulse:  [88-91]   Resp:  [16]   BP: (145-175)/(66-74)   SpO2:  [99 %-100 %] .   Patient currently off IV antihypertensives.   Home meds for hypertension were reviewed and noted below.   Hypertension Medications             losartan (COZAAR) 100 MG tablet TAKE 1 TABLET BY MOUTH EVERY DAY          Medication adjustment for hospital antihypertensives is as follows- resume losartan     Will goal for controlled BP reduction as noted be medications noted above and monitor and mitigate end organ damage as indicated.

## 2022-10-31 NOTE — ED PROVIDER NOTES
Encounter Date: 10/31/2022    SCRIBE #1 NOTE: I, Tk Bonner Jr, am scribing for, and in the presence of,  Annabel Padgett MD. I have scribed the following portions of the note - Other sections scribed: HPI, ROS, PE, MDM.     History     Chief Complaint   Patient presents with    Deep Vein Thrombosis     Pt referred to  ED after having ultrasound on bilateral lower legs which showed blood clot in left leg, denies SOB, pt states left leg has had swelling x 1 week      Joi Castellanos is a 77 y.o. male who has a past medical history of arthritis, diabetes mellitus, type II, and hyperlipidemia.The patient presents to the emergency department due to deep vein thrombosis; onset 4 days ago. He saw his PCP on Friday and had an US scheduled for today The US was positive and the patient was told to come to the ED. Associated symptoms include leg swelling and generalized weakness. He denies chest pain or shortness of breath. He is not on blood thinners. The patient fell on Wednesday and sustained a proximal humerus fracture. There is some question of a pathologic fracture.    The history is provided by the patient. No  was used.   Review of patient's allergies indicates:   Allergen Reactions    Asa [aspirin]      Past Medical History:   Diagnosis Date    Arthritis     Diabetes mellitus, type II     fasting bg criteria    Hyperlipidemia      Past Surgical History:   Procedure Laterality Date    TONSILLECTOMY       Family History   Problem Relation Age of Onset    Cancer Mother         unknown, met to spine    Hypertension Mother     Osteoarthritis Mother     Deep vein thrombosis Father     Heart attack Father     Pulmonary embolism Father     Emphysema Father     Arthritis Sister     No Known Problems Daughter     No Known Problems Son     Fibromyalgia Sister     Breast cancer Maternal Aunt     Heart disease Neg Hx     Prostate cancer Neg Hx     Colon cancer Neg Hx     Diabetes Neg Hx      Social  History     Tobacco Use    Smoking status: Never    Smokeless tobacco: Never   Substance Use Topics    Alcohol use: Yes     Comment: Occasionally    Drug use: No     Review of Systems   Constitutional:  Negative for fever.        Positive deep vein thrombosis.   HENT:  Negative for sore throat.    Respiratory:  Negative for shortness of breath.    Cardiovascular:  Positive for leg swelling. Negative for chest pain.   Gastrointestinal:  Negative for nausea.   Genitourinary:  Negative for dysuria.   Musculoskeletal:  Negative for back pain.   Skin:  Negative for rash.   Neurological:  Positive for weakness.   Hematological:  Does not bruise/bleed easily.     Physical Exam     Initial Vitals [10/31/22 1247]   BP Pulse Resp Temp SpO2   (!) 145/66 88 16 97.6 °F (36.4 °C) 99 %      MAP       --         Physical Exam    Nursing note and vitals reviewed.  Constitutional: He appears well-developed and well-nourished.   HENT:   Head: Normocephalic and atraumatic.   Eyes: EOM are normal. Pupils are equal, round, and reactive to light.   Neck: Neck supple.   Normal range of motion.  Cardiovascular:  Normal rate, regular rhythm, normal heart sounds and intact distal pulses.           Pulmonary/Chest: Breath sounds normal.   Abdominal: Abdomen is soft. Bowel sounds are normal. He exhibits no distension. There is no abdominal tenderness. There is no rebound and no guarding.   Musculoskeletal:         General: No edema. Normal range of motion.      Cervical back: Normal range of motion and neck supple.     Neurological: He is alert and oriented to person, place, and time.   Skin: Skin is warm and dry.   Psychiatric: He has a normal mood and affect. His behavior is normal. Judgment and thought content normal.     ED Course   Procedures  Labs Reviewed   CBC W/ AUTO DIFFERENTIAL - Abnormal; Notable for the following components:       Result Value    WBC 13.65 (*)     RBC 2.53 (*)     Hemoglobin 8.3 (*)     Hematocrit 25.9 (*)     MCV  102 (*)     MCH 32.8 (*)     RDW 16.6 (*)     Platelets 116 (*)     Gran % 83.0 (*)     Lymph % 17.0 (*)     Mono % 0.0 (*)     Platelet Estimate Decreased (*)     All other components within normal limits   COMPREHENSIVE METABOLIC PANEL - Abnormal; Notable for the following components:    Sodium 133 (*)     Glucose 136 (*)     Albumin 3.0 (*)     Alkaline Phosphatase 516 (*)     All other components within normal limits   PROTIME-INR   URINALYSIS, REFLEX TO URINE CULTURE          Imaging Results    None          Medications   losartan tablet 100 mg (has no administration in time range)   atorvastatin tablet 80 mg (has no administration in time range)   enoxaparin injection 80 mg (has no administration in time range)   sodium chloride 0.9% flush 10 mL (has no administration in time range)   acetaminophen tablet 650 mg (has no administration in time range)   naloxone 0.4 mg/mL injection 0.02 mg (has no administration in time range)   HYDROcodone-acetaminophen 5-325 mg per tablet 1 tablet (has no administration in time range)   ondansetron injection 4 mg (has no administration in time range)     Medical Decision Making:   History:   Old Medical Records: I decided to obtain old medical records.  Initial Assessment:   Joi Castellanos is a 77 y.o. male who has a past medical history of arthritis, diabetes mellitus, type II, and hyperlipidemia.The patient presents to the emergency department due to deep vein thrombosis; onset this morning. Associated symptoms include leg swelling.  Clinical Tests:   Lab Tests: Ordered and Reviewed        Scribe Attestation:   Scribe #1: I performed the above scribed service and the documentation accurately describes the services I performed. I attest to the accuracy of the note.      ED Course as of 10/31/22 1506   Mon Oct 31, 2022   1447 Case was discussed with [facility] hospitalist, Dr. Heck.  The patient will be admitted to the [facility] hospitalist service.  This patient is a fall  risk with a recent fracture that is possibly pathologic, worsening anemia and now new onset DVT.  He will be admitted for a medical workup for blood loss anemia and possible pathologic fracture being a metastatic lesion. [ST]      ED Course User Index  [ST] Annabel Padgett MD               I, Annabel Padgett, personally performed the services described in this documentation. All medical record entries made by the scribe were at my direction and in my presence.  I have reviewed the chart and agree that the record reflects my personal performance and is accurate and complete. Annabel Padgett M.D. 3:06 PM10/31/2022       Clinical Impression:   Final diagnoses:  [I82.4Y2] Acute deep vein thrombosis (DVT) of proximal vein of left lower extremity (Primary)  [D62] Acute blood loss anemia  [I82.409] DVT (deep venous thrombosis)  [R53.1] Weakness  [S42.294S] Other closed nondisplaced fracture of proximal end of right humerus, sequela      ED Disposition Condition    Observation                 Annabel Padgett MD  10/31/22 2977

## 2022-10-31 NOTE — H&P
[facility] Medicine  History & Physical    Patient Name: Joi Castellanos  MRN: [id number]  Patient Class: OP- Observation  Admission Date: 10/31/2022  Attending Physician: Quentin Heck, *   Primary Care Provider: Marc Lakhani MD         Patient information was obtained from patient, relative(s), past medical records and ER records.     Subjective:     Principal Problem:DVT (deep venous thrombosis)    Chief Complaint:   Chief Complaint   Patient presents with    Deep Vein Thrombosis     Pt referred to  ED after having ultrasound on bilateral lower legs which showed blood clot in left leg, denies SOB, pt states left leg has had swelling x 1 week         HPI: 76 yo male with a PMH of DM II, HLD, Hip replacement 2019, noted pathologic R humerus fx 10/26/22 presents today with LLE edema for about a week. He notes the edema to be prior to the fx occurring. Prior to the noted fracture, he states he caught his weight on the R arm after slipping back slightly while on the toilet.. Since 10/26 he has had prolonged sitting. He notes about 25 pounds of unintentional weight loss over the past 6 months. He notes R lumbar aching pain, constant for the past few days. He denies HA, vision changes, dizziness, lightheadedness, CP, SOB, abdominal pain, n/v/d/f/c/c, numbness, tingling, recent travel, melena, hemoptysis, hematemesis.     Family hx mother: cancer with spinal tumors    He has a follow up appointment with Hemoc planned for 11/4/22 for suspected possible malignancy.       Past Medical History:   Diagnosis Date    Arthritis     Diabetes mellitus, type II     fasting bg criteria    Hyperlipidemia        Past Surgical History:   Procedure Laterality Date    TONSILLECTOMY         Review of patient's allergies indicates:   Allergen Reactions    Asa [aspirin]        No current facility-administered medications on file prior to encounter.     Current Outpatient Medications on File Prior to  Encounter   Medication Sig    co-enzyme Q-10 30 mg capsule     fluticasone propionate (FLONASE) 50 mcg/actuation nasal spray 2 sprays (100 mcg total) by Each Nostril route once daily.    losartan (COZAAR) 100 MG tablet TAKE 1 TABLET BY MOUTH EVERY DAY    methocarbamoL (ROBAXIN) 500 MG Tab Take 2 tablets (1,000 mg total) by mouth 3 (three) times daily. for 5 days    MULTIVIT &MINERALS/FERROUS FUM (MULTI VITAMIN ORAL) Take by mouth once daily.    oxyCODONE-acetaminophen (PERCOCET) 5-325 mg per tablet Take 1 tablet by mouth every 6 (six) hours as needed for Pain.    rosuvastatin (CRESTOR) 20 MG tablet TAKE 1 TABLET BY MOUTH EVERY DAY    vitamin D (VITAMIN D3) 1000 units Tab Take 1,000 Units by mouth once daily.    zinc gluconate 50 mg tablet Take 50 mg by mouth once daily. PRN     Family History       Problem Relation (Age of Onset)    Arthritis Sister    Breast cancer Maternal Aunt    Cancer Mother    Deep vein thrombosis Father    Emphysema Father    Fibromyalgia Sister    Heart attack Father    Hypertension Mother    No Known Problems Daughter, Son    Osteoarthritis Mother    Pulmonary embolism Father          Tobacco Use    Smoking status: Never    Smokeless tobacco: Never   Substance and Sexual Activity    Alcohol use: Yes     Comment: Occasionally    Drug use: No    Sexual activity: Yes     Partners: Female     Review of Systems   Constitutional:  Positive for activity change, appetite change, fatigue and unexpected weight change. Negative for chills, diaphoresis and fever.   HENT:  Negative for trouble swallowing and voice change.    Eyes:  Negative for photophobia and visual disturbance.   Respiratory:  Negative for cough, choking, chest tightness, shortness of breath, wheezing and stridor.    Cardiovascular:  Positive for leg swelling. Negative for chest pain and palpitations.   Gastrointestinal:  Negative for abdominal pain, constipation, diarrhea, nausea and vomiting.   Genitourinary:  Negative  for difficulty urinating, dysuria, enuresis, flank pain and hematuria.   Musculoskeletal:  Positive for arthralgias and back pain. Negative for myalgias.   Skin:  Negative for rash and wound.   Neurological:  Negative for dizziness, tremors, syncope, facial asymmetry, weakness, light-headedness, numbness and headaches.   Hematological:  Negative for adenopathy. Does not bruise/bleed easily.   Objective:     Vital Signs (Most Recent):  Temp: 97.6 °F (36.4 °C) (10/31/22 1247)  Pulse: 91 (10/31/22 1514)  Resp: 16 (10/31/22 1514)  BP: (!) 175/74 (10/31/22 1514)  SpO2: 100 % (10/31/22 1514)   Vital Signs (24h Range):  Temp:  [97.6 °F (36.4 °C)] 97.6 °F (36.4 °C)  Pulse:  [88-91] 91  Resp:  [16] 16  SpO2:  [99 %-100 %] 100 %  BP: (145-175)/(66-74) 175/74     Weight: 77.6 kg (171 lb)  Body mass index is 21.96 kg/m².    Physical Exam  Constitutional:       Appearance: Normal appearance.      Comments: Appears thin    HENT:      Right Ear: There is impacted cerumen.      Nose: Nose normal.      Mouth/Throat:      Pharynx: Oropharynx is clear.   Eyes:      Extraocular Movements: Extraocular movements intact.      Conjunctiva/sclera: Conjunctivae normal.   Cardiovascular:      Rate and Rhythm: Normal rate and regular rhythm.      Pulses: Normal pulses.      Heart sounds: Normal heart sounds.   Pulmonary:      Effort: Pulmonary effort is normal.      Breath sounds: Normal breath sounds.   Abdominal:      General: Abdomen is flat. Bowel sounds are normal.      Palpations: Abdomen is soft.   Musculoskeletal:         General: Swelling present. Normal range of motion.      Cervical back: Normal range of motion and neck supple.      Comments: L ankle edema     RUE in splint and sling    Skin:     General: Skin is warm and dry.   Neurological:      General: No focal deficit present.      Mental Status: He is alert and oriented to person, place, and time.      Sensory: No sensory deficit.           Significant Labs: All pertinent labs  within the past 24 hours have been reviewed.    Significant Imaging: I have reviewed all pertinent imaging results/findings within the past 24 hours.    Assessment/Plan:     * DVT (deep venous thrombosis)  Extensive left lower extremity deep vein thrombosis and partially occlusive thrombus in the right femoral vein.    Symptoms of L ankle edema occurred prior to noted fx.   With pathologic fx and multiple DVTs concerned for malignancy     hypercoagulable labs   -full dose lovenox with plans for DOAC at discharge   -consult hemoc   -PT/OT  May need placement with debilitated state     Lumbar pain  -xray lumbar spine     Impacted cerumen of right ear    -liquid colace and normal saline flush     Iron deficiency anemia  Anemia panel noted 9/2022  Hgb 10.5--> 8.3 over the past 5 months   Will transfuse if < 7 or symptomatic   Continue to monitor   hemoc consulted     Fracture of right humerus    In splint and sling since 10/26  Neurovascularly intact  -consult ortho     Diabetes mellitus, type II  Patient's FSGs are controlled on current medication regimen.  Last A1c reviewed-   Lab Results   Component Value Date    HGBA1C 6.1 (H) 09/08/2022     Most recent fingerstick glucose reviewed- No results for input(s): POCTGLUCOSE in the last 24 hours.  Current correctional scale  Low    Hold Oral hypoglycemics while patient is in the hospital.    Hypertension    Patient has a current diagnosis of HTN which is uncontrolled.  Latest blood pressure and vitals reviewed-   Temp:  [97.6 °F (36.4 °C)]   Pulse:  [88-91]   Resp:  [16]   BP: (145-175)/(66-74)   SpO2:  [99 %-100 %] .   Patient currently off IV antihypertensives.   Home meds for hypertension were reviewed and noted below.   Hypertension Medications             losartan (COZAAR) 100 MG tablet TAKE 1 TABLET BY MOUTH EVERY DAY          Medication adjustment for hospital antihypertensives is as follows- resume losartan     Will goal for controlled BP reduction as noted be  medications noted above and monitor and mitigate end organ damage as indicated.        Hyperlipidemia    Lab Results   Component Value Date    LDLCALC 63.4 06/08/2022     Resume statin       VTE Risk Mitigation (From admission, onward)         Ordered     enoxaparin injection 80 mg  Every 12 hours         10/31/22 6775                   Kesha Higgins NP  Department of Hospital Medicine   [place] - Emergency Dept

## 2022-10-31 NOTE — ASSESSMENT & PLAN NOTE
Extensive left lower extremity deep vein thrombosis and partially occlusive thrombus in the right femoral vein.    Symptoms of L ankle edema occurred prior to noted fx.   With pathologic fx and multiple DVTs concerned for malignancy     hypercoagulable labs   -full dose lovenox with plans for DOAC at discharge   -consult hemoc   -PT/OT  May need placement with debilitated state

## 2022-10-31 NOTE — PROGRESS NOTES
Spoke to radiology tech regarding positive bilateral DVT study. As per last note, concerned that there is a malignancy to be worked up given pathologic humeral fracture. Oncology visit coming up this week. Will need further admission for work up and appropriate anticoagulation initiation spoke to radiology tech about plan and she was walking him down to the emergency room. I called the emergency room to notify of arrival.

## 2022-10-31 NOTE — ASSESSMENT & PLAN NOTE
Patient's FSGs are controlled on current medication regimen.  Last A1c reviewed-   Lab Results   Component Value Date    HGBA1C 6.1 (H) 09/08/2022     Most recent fingerstick glucose reviewed- No results for input(s): POCTGLUCOSE in the last 24 hours.  Current correctional scale  Low    Hold Oral hypoglycemics while patient is in the hospital.

## 2022-10-31 NOTE — ASSESSMENT & PLAN NOTE
- iron studies suggest anemia of chronic disease/anemia of neoplastic disease  - workup in progress.

## 2022-10-31 NOTE — SUBJECTIVE & OBJECTIVE
- he reports that symptoms began in February 2022 with upper respiratory/sinus issues. He was covid negative, had zyrtec, but his symptoms did not improve.  - he was then placed on metformin.  - associated symptoms include weight loss (intentional and unintentional), generalized weakness, constipation. He has been taking oral iron. He denies chest pain.      Oncology Treatment Plan:   [No matching plan found]    Medications:  Continuous Infusions:  Scheduled Meds:   atorvastatin  80 mg Oral QHS    docusate  100 mg Otic Once    enoxaparin  1 mg/kg Subcutaneous Q12H    losartan  100 mg Oral Daily     PRN Meds:acetaminophen, dextrose 10%, dextrose 10%, glucagon (human recombinant), glucose, glucose, HYDROcodone-acetaminophen, insulin aspart U-100, naloxone, ondansetron, sodium chloride 0.9%     Review of patient's allergies indicates:   Allergen Reactions    Asa [aspirin]         Past Medical History:   Diagnosis Date    Arthritis     Diabetes mellitus, type II     fasting bg criteria    Hyperlipidemia      Past Surgical History:   Procedure Laterality Date    TONSILLECTOMY       Family History       Problem Relation (Age of Onset)    Arthritis Sister    Breast cancer Maternal Aunt    Cancer Mother    Deep vein thrombosis Father    Emphysema Father    Fibromyalgia Sister    Heart attack Father    Hypertension Mother    No Known Problems Daughter, Son    Osteoarthritis Mother    Pulmonary embolism Father          Tobacco Use    Smoking status: Never    Smokeless tobacco: Never   Substance and Sexual Activity    Alcohol use: Yes     Comment: Occasionally    Drug use: No    Sexual activity: Yes     Partners: Female       Review of Systems   Constitutional:  Positive for fatigue and unexpected weight change.   HENT:  Negative for sore throat.    Eyes:  Negative for visual disturbance.   Respiratory:  Positive for shortness of breath.    Cardiovascular:  Negative for chest pain.   Gastrointestinal:  Positive for  constipation. Negative for abdominal pain.   Genitourinary:  Negative for dysuria.   Musculoskeletal:  Positive for back pain.   Skin:  Negative for rash.   Neurological:  Positive for weakness. Negative for headaches.   Hematological:  Negative for adenopathy.   Psychiatric/Behavioral:  The patient is not nervous/anxious.    Objective:     Vital Signs (Most Recent):  Temp: 97.6 °F (36.4 °C) (10/31/22 1247)  Pulse: 91 (10/31/22 1514)  Resp: 16 (10/31/22 1514)  BP: (!) 175/74 (10/31/22 1514)  SpO2: 100 % (10/31/22 1514)   Vital Signs (24h Range):  Temp:  [97.6 °F (36.4 °C)] 97.6 °F (36.4 °C)  Pulse:  [88-91] 91  Resp:  [16] 16  SpO2:  [99 %-100 %] 100 %  BP: (145-175)/(66-74) 175/74     Weight: 77.6 kg (171 lb)  Body mass index is 21.96 kg/m².  Body surface area is 2.01 meters squared.    No intake or output data in the 24 hours ending 10/31/22 1623    Physical Exam  Vitals and nursing note reviewed.   Constitutional:       Appearance: He is well-developed.      Comments: Fatigued   HENT:      Head: Normocephalic and atraumatic.   Eyes:      Pupils: Pupils are equal, round, and reactive to light.   Cardiovascular:      Rate and Rhythm: Normal rate and regular rhythm.   Pulmonary:      Effort: Pulmonary effort is normal.      Breath sounds: Normal breath sounds.   Abdominal:      General: Bowel sounds are normal.      Palpations: Abdomen is soft.   Musculoskeletal:         General: Normal range of motion.      Cervical back: Normal range of motion and neck supple.   Skin:     General: Skin is warm and dry.   Neurological:      Mental Status: He is alert and oriented to person, place, and time.   Psychiatric:         Behavior: Behavior normal.         Thought Content: Thought content normal.         Judgment: Judgment normal.       Significant Labs:   CBC:   Recent Labs   Lab 10/31/22  1407   WBC 13.65*   HGB 8.3*   HCT 25.9*   *    and CMP:   Recent Labs   Lab 10/31/22  1407   *   K 4.6   CL 95   CO2 24    *   BUN 15   CREATININE 0.7   CALCIUM 9.3   PROT 7.9   ALBUMIN 3.0*   BILITOT 0.6   ALKPHOS 516*   AST 34   ALT 33   ANIONGAP 14       Diagnostic Results:  Chest x-ray: I have personally reviewed the images  - right pleural effusion    Lumbar x-ray (10/31/22): I have personally reviewed the images  (Personal review), perhaps bone lesions noted in lumbar spine.    Right humerus x-ray (10/26/22):  There is slight angulation of a complete transverse pathologic fracture involving the right proximal humeral metadiaphysis.  No other appreciable acute fracture of the right humerus is seen and there is no dislocation at the shoulder.  Degenerative changes of the acromioclavicular and glenohumeral joints are noted.  Soft tissues grossly appear intact.     Impression:     Pathologic fracture involving the proximal right humeral metadiaphysis as also noted on the right shoulder series.

## 2022-10-31 NOTE — PHARMACY MED REC
"Admission Medication History     The home medication history was taken by Marguerite Whaley CPhT.    Medication history obtained from, Patient Verified    You may go to "Admission" then "Reconcile Home Medications" tabs to review and/or act upon these items.     The home medication list has been updated by the Pharmacy department.   Please read ALL comments highlighted in yellow.   Please address this information as you see fit.    Feel free to contact us if you have any questions or require assistance.      The medications listed below were removed from the home medication list.  Please reorder if appropriate:  Patient reports no longer taking the following medication(s):  Multivitamin with minerals        Marguerite Whaley CPhT.  Ext [phone]               .        "

## 2022-10-31 NOTE — ED NOTES
Pt was sent from outpatient ultrasound due to dx. Of (L) DVT. Pt denies pain in the left leg but has had swelling for approx. 1 week. He reports the swelling is significantly improved-only affecting foot/ankle currently. denies sob.  Pt is in no distress and is resting comfortably. The left leg & ankle has 2+ edema with mild discoloration. Pt. Will be admitted and is awaiting a bed assignment. Pt. Also had recent pathological fx. Of rt. Humerus after light weight bearing and is in a sling and swathe immobilizer. Reports minimal pain at rest. Neurovascular exam wnl.

## 2022-10-31 NOTE — MEDICAL/APP STUDENT
[facility] Medicine Student   History and Physical  Networked reference to record PCT   10/31/2022  2:57 PM    SUBJECTIVE:     Chief Complaint:  Deep Vein Thrombosis    History of Present Illness:  Mr. Joi Castellanos is a 77 y.o. male with a relevant medical history of HTN, HLD, DMT2 who presents with to [facility] ED per Family Medicine referral for positive bilateral DVT study and concerns for malignancy given recent pathologic humeral fracture. Patient states his left leg has had swelling for about 1 week. Denies shortness of breath.    *** The first sentence should introduce the patient with key demographics and personal history that will be pertinent to the differential diagnosis later.    The first paragraph details the CC using a FARCOLDER or other mnemonic.  The Emergency Room Course could also be added if anything they did helped or did not help the patient.  Chronological history of the symptoms and treatments may also be presented here as well.      *** The second paragraph records pertinent positives and negatives of the CC.  Before starting, think of the differential diagnosis of the CC, then write the symptoms, history, ROS, risk factors that argue for or against each diagnosis in the differential.  The differential diagnosis list however goes in the Assessment, not here.    ROS  *** A Comprehensive H&P has ten or more systems reviewed.      HISTORY:     Past Medical History:   Diagnosis Date    Arthritis     Diabetes mellitus, type II     fasting bg criteria    Hyperlipidemia        Past Surgical History:   Procedure Laterality Date    TONSILLECTOMY         Family History   Problem Relation Age of Onset    Cancer Mother         unknown, met to spine    Hypertension Mother     Osteoarthritis Mother     Deep vein thrombosis Father     Heart attack Father     Pulmonary embolism Father     Emphysema Father     Arthritis Sister     No Known Problems Daughter     No Known Problems Son     Fibromyalgia  "Sister     Breast cancer Maternal Aunt     Heart disease Neg Hx     Prostate cancer Neg Hx     Colon cancer Neg Hx     Diabetes Neg Hx        Social History     Socioeconomic History    Marital status:    Tobacco Use    Smoking status: Never    Smokeless tobacco: Never   Substance and Sexual Activity    Alcohol use: Yes     Comment: Occasionally    Drug use: No    Sexual activity: Yes     Partners: Female   Social History Narrative    Lives in [place] with his wife. He is retired from [employer]. He is originally from [facility] [facility]. 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  2 daughters with marital and family problems. One of them is  and remarried because her  has a serious anger problems. His other son-in-law is in treatment for drug and alcohol problems. He has 5 grand children. He was in the [employer]. He goes to the gym. His son loves to hunt. He bought a small RV     *** Any "Social Determinants of Health" such as Smoking, Alcohol/drug use, financial/insurance needs, food insecurity, transportation needs, lack of exercise, mental stress, depression, housing stability, or lack of caregiver/social support?    Always remember to include the patient's attitudes towards their illness or the social impact.    Are there any social barriers to treatment or disposition?    MEDICATIONS & ALLERGIES:     No current facility-administered medications on file prior to encounter.     Current Outpatient Medications on File Prior to Encounter   Medication Sig Dispense Refill    co-enzyme Q-10 30 mg capsule       fluticasone propionate (FLONASE) 50 mcg/actuation nasal spray 2 sprays (100 mcg total) by Each Nostril route once daily. 16 g 11    losartan (COZAAR) 100 MG tablet TAKE 1 TABLET BY MOUTH EVERY DAY 90 tablet 3    methocarbamoL (ROBAXIN) 500 MG Tab Take 2 tablets (1,000 mg total) by mouth 3 (three) times daily. for 5 days 30 tablet 0    MULTIVIT &MINERALS/FERROUS FUM (MULTI VITAMIN ORAL) Take by mouth once daily.      " oxyCODONE-acetaminophen (PERCOCET) 5-325 mg per tablet Take 1 tablet by mouth every 6 (six) hours as needed for Pain. 12 tablet 0    rosuvastatin (CRESTOR) 20 MG tablet TAKE 1 TABLET BY MOUTH EVERY DAY 90 tablet 3    vitamin D (VITAMIN D3) 1000 units Tab Take 1,000 Units by mouth once daily.      zinc gluconate 50 mg tablet Take 50 mg by mouth once daily. PRN       *** Note if any medications are new or not used anymore.  Have the good habit of looking up the medications to see what they treat.  You may realize there is more to the medical history and learn some pharmacy as well.  This is a good time to educate the patient as to why they are taking something which improves compliance and safety.  Review of patient's allergies indicates:   Allergen Reactions    Asa [aspirin]        OBJECTIVE:     Vital Signs Recent:  Temp: 97.6 °F (36.4 °C) (10/31/22 1247)  Pulse: 88 (10/31/22 1247)  Resp: 16 (10/31/22 1247)  BP: (!) 145/66 (10/31/22 1247)  SpO2: 99 % (10/31/22 1247)    Oxygen Documentation:                O2 Device (Oxygen Therapy): room air         Vital Signs Range (Last 24H):  Temp:  [97.6 °F (36.4 °C)]   Pulse:  [88]   Resp:  [16]   BP: (145)/(66)   SpO2:  [99 %]        Weight:  Body mass index is 21.96 kg/m².  Wt Readings from Last 3 Encounters:   10/31/22 77.6 kg (171 lb)   10/28/22 77.7 kg (171 lb 4.8 oz)   10/27/22 77.1 kg (170 lb)        Physical Exam   *** A comprehensive exam needs two findings in nine organ systems.      Sodium (mmol/L)   Date Value   10/31/2022 133 (L)   10/26/2022 138   09/08/2022 138     Potassium (mmol/L)   Date Value   10/31/2022 4.6   10/26/2022 4.5   09/08/2022 4.8     Chloride (mmol/L)   Date Value   10/31/2022 95   10/26/2022 97   09/08/2022 97     CO2 (mmol/L)   Date Value   10/31/2022 24   10/26/2022 27   09/08/2022 29     BUN (mg/dL)   Date Value   10/31/2022 15   10/26/2022 15   09/08/2022 13     Creatinine (mg/dL)   Date Value   10/31/2022 0.7   10/26/2022 0.8   09/08/2022  0.92     Glucose (mg/dL)   Date Value   10/31/2022 136 (H)   10/26/2022 213 (H)   09/08/2022 145 (H)     Calcium (mg/dL)   Date Value   10/31/2022 9.3   10/26/2022 9.1   09/08/2022 9.5     Alkaline Phosphatase (U/L)   Date Value   10/31/2022 516 (H)   10/26/2022 525 (H)   09/08/2022 189 (H)     ALT (U/L)   Date Value   10/31/2022 33   10/26/2022 17   09/08/2022 32     AST (U/L)   Date Value   10/31/2022 34   10/26/2022 21   09/08/2022 28     Albumin (g/dL)   Date Value   10/31/2022 3.0 (L)   10/26/2022 3.1 (L)   09/08/2022 4.3     Total Protein (g/dL)   Date Value   10/31/2022 7.9   10/26/2022 7.7   09/08/2022 8.7 (H)     Total Bilirubin (mg/dL)   Date Value   10/31/2022 0.6   10/26/2022 0.5   09/08/2022 0.7       WBC (K/uL)   Date Value   10/31/2022 13.65 (H)   10/26/2022 12.43   09/08/2022 5.91     Hemoglobin (g/dL)   Date Value   10/31/2022 8.3 (L)   10/26/2022 8.4 (L)   09/08/2022 9.3 (L)     Platelets (K/uL)   Date Value   10/31/2022 116 (L)   10/26/2022 123 (L)   09/08/2022 214       Diagnostic Results:  US Lower Extremity Veins 10/31/22: Extensive left lower extremity deep vein thrombosis and partially occlusive thrombus in the right femoral vein.    X-Ray Humerus 10/26/22: Pathologic fracture involving the proximal right humeral metadiaphysis as also noted on the right shoulder series.    X-Ray Shoulder 10/26/22: 1.  Acute minimally displaced pathological fracture of the proximal metadiaphysis of the right humerus. 2.  Right basilar airspace opacity.    ASSESSMENT -- PLAN:   Mr. Joi Castellanos is a 77 y.o. male with a relevant medical history of HTN, HLD, DMT2 who is being treated for DVT (deep venous thrombosis).  *** You need to commit to a diagnosis.  The main hypothesis should be supported with a linear, logical pathophsyiological explanation of your clinical reasoning.  You MUST discuss three other diagnoses considered and why these diagnoses are less likely in this patient using epidemiology, time  course, and clinical presentation as examples.    List at least one relevant textbook chapter AND one review article or guideline relating to the case seen.    Active Hospital Problems    Diagnosis  POA    *DVT (deep venous thrombosis) [I82.409]  Unknown    Diabetes mellitus, type II [E11.9]  Yes    Hypertension [I10]  Yes    Hyperlipidemia [E78.5]  Yes      Resolved Hospital Problems   No resolved problems to display.        *** Always prioritize what you write, make an assessment of each diagnosis (stable/unstable or controlled/uncontrolled), and what you want to do and why.  In general, if you are ordering a medication or will be monitoring a chronic problem you should list that diagnosis.    Deep Vein Thrombosis  ASSESSMENT:  Right leg swelling x 1 week. US lower extremity demonstrates extensive left lower extremity deep vein thrombosis and partially occlusive thrombus in the right femoral vein. Referred to ED from family medicine for anticoagulation.    PLAN:  - Eloquis 5mg BID PO.    2. Fracture of Humerus  ASSESSMENT: Concerns for pathologic fracture due to malignancy. Has follow-up with Oncology scheduled outpatient this week.    PLAN:  - PT/OT to assess mobility  - Follow up oncology      Discharge planning:   *** Most teams like you to mention what criteria we need for the patient to be discharged (e.g. afebrile for 24 hours) and the Disposition (discharge plans such as home or rehab). Code status may be mentioned here as well.

## 2022-10-31 NOTE — ED NOTES
Pt. Returned from x-ray per stretcher. Repositioned in bed for comfort. Pt. Was offered pain medication but declines at this time. Denies pain at rest.

## 2022-10-31 NOTE — ASSESSMENT & PLAN NOTE
Anemia panel noted 9/2022  Hgb 10.5--> 8.3 over the past 5 months   Will transfuse if < 7 or symptomatic   Continue to monitor   hemoc consulted

## 2022-10-31 NOTE — FIRST PROVIDER EVALUATION
Emergency Department TeleTriage Encounter Note      CHIEF COMPLAINT    Chief Complaint   Patient presents with    Deep Vein Thrombosis     Pt referred to  ED after having ultrasound on bilateral lower legs which showed blood clot in left leg, denies SOB, pt states left leg has had swelling x 1 week        VITAL SIGNS   Initial Vitals [10/31/22 1247]   BP Pulse Resp Temp SpO2   (!) 145/66 88 16 97.6 °F (36.4 °C) 99 %      MAP       --            ALLERGIES    Review of patient's allergies indicates:   Allergen Reactions    Asa [aspirin]        PROVIDER TRIAGE NOTE  TeleTriage Note: Joi Castellanos, a nontoxic/well appearing, 77 y.o. male, presented to the ED with c/o told to come to the ED for blood clots in his leg. Denies chest pain or SOB. Sent by the radiology department.    All ED beds are full at present; patient notified of this status.  Patient seen and medically screened by Nurse Practitioner via teletriage. Orders initiated at triage to expedite care.  Patient is stable to return to the waiting room and will be placed in an ED bed when available.  Care will be transferred to an alternate provider when patient has been placed in an Exam Room from the [facility] for physical exam, additional orders, and disposition.  1:41 PM Yudy Bustamante, DNP, FNP-C        ORDERS  Labs Reviewed   CBC W/ AUTO DIFFERENTIAL   COMPREHENSIVE METABOLIC PANEL       ED Orders (720h ago, onward)      Start Ordered     Status Ordering Provider    10/31/22 1343 10/31/22 1343  Insert peripheral IV  Continuous         Ordered YUDY BUSTAMANTE    10/31/22 1343 10/31/22 1343  CBC auto differential  STAT         Ordered YUDY BUSTAMANTE    10/31/22 1343 10/31/22 1343  Comprehensive metabolic panel  STAT         Ordered YUDY BUSTAMANTE              Virtual Visit Note: The provider triage portion of this emergency department evaluation and documentation was performed via [employer], a HIPAA-compliant telemedicine application, in concert  with a tele-presenter in the room. A face to face patient evaluation with one of my colleagues will occur once the patient is placed in an emergency department room.      DISCLAIMER: This note was prepared with [employer] voice recognition transcription software. Garbled syntax, mangled pronouns, and other bizarre constructions may be attributed to that software system.

## 2022-10-31 NOTE — CONSULTS
[facility] - Emergency Dept  Hematology/Oncology  Consult Note    Patient Name: Joi Castellanos  MRN: [id number]  Admission Date: 10/31/2022  Hospital Length of Stay: 0 days  Code Status: Full Code   Attending Provider: Quentin Heck, *  Consulting Provider: Rj Wilson MD  Primary Care Physician: Marc Lakhani MD  Principal Problem:DVT (deep venous thrombosis)    Inpatient consult to Hematology/Oncology  Consult performed by: Rj Wilson MD  Consult ordered by: Kesha Higgins NP  Reason for consult: pathologic fracture, anemia        Subjective:     HPI:  77 year-old male with right humerus fracture, anemia was admitted on 10/31/22 for deep vein thrombosis. Concern is for possible pathologic fracture, malignancy.      - he reports that symptoms began in February 2022 with upper respiratory/sinus issues. He was covid negative, had zyrtec, but his symptoms did not improve.  - he was then placed on metformin.  - associated symptoms include weight loss (intentional and unintentional), generalized weakness, constipation. He has been taking oral iron. He denies chest pain.      Oncology Treatment Plan:   [No matching plan found]    Medications:  Continuous Infusions:  Scheduled Meds:   atorvastatin  80 mg Oral QHS    docusate  100 mg Otic Once    enoxaparin  1 mg/kg Subcutaneous Q12H    losartan  100 mg Oral Daily     PRN Meds:acetaminophen, dextrose 10%, dextrose 10%, glucagon (human recombinant), glucose, glucose, HYDROcodone-acetaminophen, insulin aspart U-100, naloxone, ondansetron, sodium chloride 0.9%     Review of patient's allergies indicates:   Allergen Reactions    Asa [aspirin]         Past Medical History:   Diagnosis Date    Arthritis     Diabetes mellitus, type II     fasting bg criteria    Hyperlipidemia      Past Surgical History:   Procedure Laterality Date    TONSILLECTOMY       Family History       Problem Relation (Age of Onset)    Arthritis Sister    Breast cancer Maternal  Aunt    Cancer Mother    Deep vein thrombosis Father    Emphysema Father    Fibromyalgia Sister    Heart attack Father    Hypertension Mother    No Known Problems Daughter, Son    Osteoarthritis Mother    Pulmonary embolism Father          Tobacco Use    Smoking status: Never    Smokeless tobacco: Never   Substance and Sexual Activity    Alcohol use: Yes     Comment: Occasionally    Drug use: No    Sexual activity: Yes     Partners: Female       Review of Systems   Constitutional:  Positive for fatigue and unexpected weight change.   HENT:  Negative for sore throat.    Eyes:  Negative for visual disturbance.   Respiratory:  Positive for shortness of breath.    Cardiovascular:  Negative for chest pain.   Gastrointestinal:  Positive for constipation. Negative for abdominal pain.   Genitourinary:  Negative for dysuria.   Musculoskeletal:  Positive for back pain.   Skin:  Negative for rash.   Neurological:  Positive for weakness. Negative for headaches.   Hematological:  Negative for adenopathy.   Psychiatric/Behavioral:  The patient is not nervous/anxious.    Objective:     Vital Signs (Most Recent):  Temp: 97.6 °F (36.4 °C) (10/31/22 1247)  Pulse: 91 (10/31/22 1514)  Resp: 16 (10/31/22 1514)  BP: (!) 175/74 (10/31/22 1514)  SpO2: 100 % (10/31/22 1514)   Vital Signs (24h Range):  Temp:  [97.6 °F (36.4 °C)] 97.6 °F (36.4 °C)  Pulse:  [88-91] 91  Resp:  [16] 16  SpO2:  [99 %-100 %] 100 %  BP: (145-175)/(66-74) 175/74     Weight: 77.6 kg (171 lb)  Body mass index is 21.96 kg/m².  Body surface area is 2.01 meters squared.    No intake or output data in the 24 hours ending 10/31/22 1623    Physical Exam  Vitals and nursing note reviewed.   Constitutional:       Appearance: He is well-developed.      Comments: Fatigued   HENT:      Head: Normocephalic and atraumatic.   Eyes:      Pupils: Pupils are equal, round, and reactive to light.   Cardiovascular:      Rate and Rhythm: Normal rate and regular rhythm.   Pulmonary:       Effort: Pulmonary effort is normal.      Breath sounds: Normal breath sounds.   Abdominal:      General: Bowel sounds are normal.      Palpations: Abdomen is soft.   Musculoskeletal:         General: Normal range of motion.      Cervical back: Normal range of motion and neck supple.   Skin:     General: Skin is warm and dry.   Neurological:      Mental Status: He is alert and oriented to person, place, and time.   Psychiatric:         Behavior: Behavior normal.         Thought Content: Thought content normal.         Judgment: Judgment normal.       Significant Labs:   CBC:   Recent Labs   Lab 10/31/22  1407   WBC 13.65*   HGB 8.3*   HCT 25.9*   *    and CMP:   Recent Labs   Lab 10/31/22  1407   *   K 4.6   CL 95   CO2 24   *   BUN 15   CREATININE 0.7   CALCIUM 9.3   PROT 7.9   ALBUMIN 3.0*   BILITOT 0.6   ALKPHOS 516*   AST 34   ALT 33   ANIONGAP 14       Diagnostic Results:  Chest x-ray: I have personally reviewed the images  - right pleural effusion    Lumbar x-ray (10/31/22): I have personally reviewed the images  (Personal review), perhaps bone lesions noted in lumbar spine.    Right humerus x-ray (10/26/22):  There is slight angulation of a complete transverse pathologic fracture involving the right proximal humeral metadiaphysis.  No other appreciable acute fracture of the right humerus is seen and there is no dislocation at the shoulder.  Degenerative changes of the acromioclavicular and glenohumeral joints are noted.  Soft tissues grossly appear intact.     Impression:     Pathologic fracture involving the proximal right humeral metadiaphysis as also noted on the right shoulder series.    Assessment/Plan:     Bone lesion  - x-ray right humerus revealed what appears to be a pathologic fracture  - chest x-ray revealed blunting of right costophrenic angle concerning for pleural effusion  - lumbar x-ray (my interpretation) perhaps reveals bone lesions  - I am concerned about  malignancy  - no renal dysfunction or hypercalcemia, so perhaps not a plasma cell dyscrasia  - check labs PSA, myeloma studies  - recommend CT chest/abdomen/pelvis for further evaluation for underlying primary site of malignancy.    Anemia of chronic disease  - iron studies suggest anemia of chronic disease/anemia of neoplastic disease  - workup in progress.        Thank you for your consult.     Rj Wilson MD  Hematology/Oncology  [place] - Emergency Dept

## 2022-10-31 NOTE — ASSESSMENT & PLAN NOTE
- x-ray right humerus revealed what appears to be a pathologic fracture  - chest x-ray revealed blunting of right costophrenic angle concerning for pleural effusion  - lumbar x-ray (my interpretation) perhaps reveals bone lesions  - I am concerned about malignancy  - no renal dysfunction or hypercalcemia, so perhaps not a plasma cell dyscrasia  - check labs PSA, myeloma studies  - recommend CT chest/abdomen/pelvis for further evaluation for underlying primary site of malignancy.

## 2022-10-31 NOTE — SUBJECTIVE & OBJECTIVE
Past Medical History:   Diagnosis Date    Arthritis     Diabetes mellitus, type II     fasting bg criteria    Hyperlipidemia        Past Surgical History:   Procedure Laterality Date    TONSILLECTOMY         Review of patient's allergies indicates:   Allergen Reactions    Asa [aspirin]        No current facility-administered medications on file prior to encounter.     Current Outpatient Medications on File Prior to Encounter   Medication Sig    co-enzyme Q-10 30 mg capsule     fluticasone propionate (FLONASE) 50 mcg/actuation nasal spray 2 sprays (100 mcg total) by Each Nostril route once daily.    losartan (COZAAR) 100 MG tablet TAKE 1 TABLET BY MOUTH EVERY DAY    methocarbamoL (ROBAXIN) 500 MG Tab Take 2 tablets (1,000 mg total) by mouth 3 (three) times daily. for 5 days    MULTIVIT &MINERALS/FERROUS FUM (MULTI VITAMIN ORAL) Take by mouth once daily.    oxyCODONE-acetaminophen (PERCOCET) 5-325 mg per tablet Take 1 tablet by mouth every 6 (six) hours as needed for Pain.    rosuvastatin (CRESTOR) 20 MG tablet TAKE 1 TABLET BY MOUTH EVERY DAY    vitamin D (VITAMIN D3) 1000 units Tab Take 1,000 Units by mouth once daily.    zinc gluconate 50 mg tablet Take 50 mg by mouth once daily. PRN     Family History       Problem Relation (Age of Onset)    Arthritis Sister    Breast cancer Maternal Aunt    Cancer Mother    Deep vein thrombosis Father    Emphysema Father    Fibromyalgia Sister    Heart attack Father    Hypertension Mother    No Known Problems Daughter, Son    Osteoarthritis Mother    Pulmonary embolism Father          Tobacco Use    Smoking status: Never    Smokeless tobacco: Never   Substance and Sexual Activity    Alcohol use: Yes     Comment: Occasionally    Drug use: No    Sexual activity: Yes     Partners: Female     Review of Systems   Constitutional:  Positive for activity change, appetite change, fatigue and unexpected weight change. Negative for chills, diaphoresis and fever.   HENT:  Negative for  trouble swallowing and voice change.    Eyes:  Negative for photophobia and visual disturbance.   Respiratory:  Negative for cough, choking, chest tightness, shortness of breath, wheezing and stridor.    Cardiovascular:  Positive for leg swelling. Negative for chest pain and palpitations.   Gastrointestinal:  Negative for abdominal pain, constipation, diarrhea, nausea and vomiting.   Genitourinary:  Negative for difficulty urinating, dysuria, enuresis, flank pain and hematuria.   Musculoskeletal:  Positive for arthralgias and back pain. Negative for myalgias.   Skin:  Negative for rash and wound.   Neurological:  Negative for dizziness, tremors, syncope, facial asymmetry, weakness, light-headedness, numbness and headaches.   Hematological:  Negative for adenopathy. Does not bruise/bleed easily.   Objective:     Vital Signs (Most Recent):  Temp: 97.6 °F (36.4 °C) (10/31/22 1247)  Pulse: 91 (10/31/22 1514)  Resp: 16 (10/31/22 1514)  BP: (!) 175/74 (10/31/22 1514)  SpO2: 100 % (10/31/22 1514)   Vital Signs (24h Range):  Temp:  [97.6 °F (36.4 °C)] 97.6 °F (36.4 °C)  Pulse:  [88-91] 91  Resp:  [16] 16  SpO2:  [99 %-100 %] 100 %  BP: (145-175)/(66-74) 175/74     Weight: 77.6 kg (171 lb)  Body mass index is 21.96 kg/m².    Physical Exam  Constitutional:       Appearance: Normal appearance.      Comments: Appears thin    HENT:      Right Ear: There is impacted cerumen.      Nose: Nose normal.      Mouth/Throat:      Pharynx: Oropharynx is clear.   Eyes:      Extraocular Movements: Extraocular movements intact.      Conjunctiva/sclera: Conjunctivae normal.   Cardiovascular:      Rate and Rhythm: Normal rate and regular rhythm.      Pulses: Normal pulses.      Heart sounds: Normal heart sounds.   Pulmonary:      Effort: Pulmonary effort is normal.      Breath sounds: Normal breath sounds.   Abdominal:      General: Abdomen is flat. Bowel sounds are normal.      Palpations: Abdomen is soft.   Musculoskeletal:         General:  Swelling present. Normal range of motion.      Cervical back: Normal range of motion and neck supple.      Comments: L ankle edema     RUE in splint and sling    Skin:     General: Skin is warm and dry.   Neurological:      General: No focal deficit present.      Mental Status: He is alert and oriented to person, place, and time.      Sensory: No sensory deficit.           Significant Labs: All pertinent labs within the past 24 hours have been reviewed.    Significant Imaging: I have reviewed all pertinent imaging results/findings within the past 24 hours.

## 2022-10-31 NOTE — ED NOTES
Dr. Wilson at bedside for consult and evaluation. Discussing plan of care/treatments with patient and family.

## 2022-11-01 PROBLEM — R91.8 HILAR MASS: Status: ACTIVE | Noted: 2022-11-01

## 2022-11-01 PROBLEM — Z51.5 PALLIATIVE CARE ENCOUNTER: Status: ACTIVE | Noted: 2022-11-01

## 2022-11-01 LAB
ALBUMIN SERPL BCP-MCNC: 2.6 G/DL (ref 3.5–5.2)
ALBUMIN SERPL ELPH-MCNC: 2.89 G/DL (ref 3.35–5.55)
ALP SERPL-CCNC: 450 U/L (ref 55–135)
ALPHA1 GLOB SERPL ELPH-MCNC: 0.96 G/DL (ref 0.17–0.41)
ALPHA2 GLOB SERPL ELPH-MCNC: 0.92 G/DL (ref 0.43–0.99)
ALT SERPL W/O P-5'-P-CCNC: 32 U/L (ref 10–44)
ANION GAP SERPL CALC-SCNC: 12 MMOL/L (ref 8–16)
ANISOCYTOSIS BLD QL SMEAR: SLIGHT
AST SERPL-CCNC: 33 U/L (ref 10–40)
B-GLOBULIN SERPL ELPH-MCNC: 1.09 G/DL (ref 0.5–1.1)
B2 MICROGLOB SERPL-MCNC: 3.2 UG/ML (ref 0–2.5)
BASOPHILS # BLD AUTO: ABNORMAL K/UL (ref 0–0.2)
BASOPHILS NFR BLD: 0 % (ref 0–1.9)
BILIRUB SERPL-MCNC: 0.6 MG/DL (ref 0.1–1)
BUN SERPL-MCNC: 14 MG/DL (ref 8–23)
CALCIUM SERPL-MCNC: 8.6 MG/DL (ref 8.7–10.5)
CHLORIDE SERPL-SCNC: 97 MMOL/L (ref 95–110)
CO2 SERPL-SCNC: 24 MMOL/L (ref 23–29)
CREAT SERPL-MCNC: 0.7 MG/DL (ref 0.5–1.4)
DIFFERENTIAL METHOD: ABNORMAL
EOSINOPHIL # BLD AUTO: ABNORMAL K/UL (ref 0–0.5)
EOSINOPHIL NFR BLD: 0 % (ref 0–8)
ERYTHROCYTE [DISTWIDTH] IN BLOOD BY AUTOMATED COUNT: 16.7 % (ref 11.5–14.5)
EST. GFR  (NO RACE VARIABLE): >60 ML/MIN/1.73 M^2
GAMMA GLOB SERPL ELPH-MCNC: 1.33 G/DL (ref 0.67–1.58)
GLUCOSE SERPL-MCNC: 127 MG/DL (ref 70–110)
HCT VFR BLD AUTO: 22.1 % (ref 40–54)
HGB BLD-MCNC: 7.4 G/DL (ref 14–18)
HYPOCHROMIA BLD QL SMEAR: ABNORMAL
IGA SERPL-MCNC: 724 MG/DL (ref 40–350)
IGG SERPL-MCNC: 1270 MG/DL (ref 650–1600)
IGM SERPL-MCNC: 125 MG/DL (ref 50–300)
IMM GRANULOCYTES # BLD AUTO: ABNORMAL K/UL (ref 0–0.04)
IMM GRANULOCYTES NFR BLD AUTO: ABNORMAL % (ref 0–0.5)
INTERPRETATION SERPL IFE-IMP: NORMAL
KAPPA LC SER QL IA: 5.96 MG/DL (ref 0.33–1.94)
KAPPA LC/LAMBDA SER IA: 1.63 (ref 0.26–1.65)
LAMBDA LC SER QL IA: 3.65 MG/DL (ref 0.57–2.63)
LDH SERPL L TO P-CCNC: 357 U/L (ref 110–260)
LYMPHOCYTES # BLD AUTO: ABNORMAL K/UL (ref 1–4.8)
LYMPHOCYTES NFR BLD: 7 % (ref 18–48)
MAGNESIUM SERPL-MCNC: 2 MG/DL (ref 1.6–2.6)
MCH RBC QN AUTO: 33 PG (ref 27–31)
MCHC RBC AUTO-ENTMCNC: 33.5 G/DL (ref 32–36)
MCV RBC AUTO: 99 FL (ref 82–98)
MONOCYTES # BLD AUTO: ABNORMAL K/UL (ref 0.3–1)
MONOCYTES NFR BLD: 4 % (ref 4–15)
MYELOCYTES NFR BLD MANUAL: 3 %
NEUTROPHILS NFR BLD: 76 % (ref 38–73)
NEUTS BAND NFR BLD MANUAL: 10 %
NRBC BLD-RTO: 0 /100 WBC
PATHOLOGIST INTERPRETATION IFE: NORMAL
PATHOLOGIST INTERPRETATION SPE: NORMAL
PHOSPHATE SERPL-MCNC: 3.6 MG/DL (ref 2.7–4.5)
PLATELET # BLD AUTO: 90 K/UL (ref 150–450)
PLATELET BLD QL SMEAR: ABNORMAL
PMV BLD AUTO: 9.7 FL (ref 9.2–12.9)
POCT GLUCOSE: 130 MG/DL (ref 70–110)
POCT GLUCOSE: 132 MG/DL (ref 70–110)
POCT GLUCOSE: 187 MG/DL (ref 70–110)
POCT GLUCOSE: 212 MG/DL (ref 70–110)
POLYCHROMASIA BLD QL SMEAR: ABNORMAL
POTASSIUM SERPL-SCNC: 4.3 MMOL/L (ref 3.5–5.1)
PROT SERPL-MCNC: 6.8 G/DL (ref 6–8.4)
PROT SERPL-MCNC: 7.2 G/DL (ref 6–8.4)
RBC # BLD AUTO: 2.24 M/UL (ref 4.6–6.2)
SODIUM SERPL-SCNC: 133 MMOL/L (ref 136–145)
WBC # BLD AUTO: 12.16 K/UL (ref 3.9–12.7)

## 2022-11-01 PROCEDURE — 63600175 PHARM REV CODE 636 W HCPCS: Performed by: NURSE PRACTITIONER

## 2022-11-01 PROCEDURE — G0378 HOSPITAL OBSERVATION PER HR: HCPCS

## 2022-11-01 PROCEDURE — 99205 OFFICE O/P NEW HI 60 MIN: CPT | Mod: ,,,

## 2022-11-01 PROCEDURE — 36415 COLL VENOUS BLD VENIPUNCTURE: CPT | Performed by: STUDENT IN AN ORGANIZED HEALTH CARE EDUCATION/TRAINING PROGRAM

## 2022-11-01 PROCEDURE — 84100 ASSAY OF PHOSPHORUS: CPT | Performed by: NURSE PRACTITIONER

## 2022-11-01 PROCEDURE — A9585 GADOBUTROL INJECTION: HCPCS | Performed by: INTERNAL MEDICINE

## 2022-11-01 PROCEDURE — 25000003 PHARM REV CODE 250: Performed by: NURSE PRACTITIONER

## 2022-11-01 PROCEDURE — 99214 PR OFFICE/OUTPT VISIT, EST, LEVL IV, 30-39 MIN: ICD-10-PCS | Mod: ,,, | Performed by: INTERNAL MEDICINE

## 2022-11-01 PROCEDURE — 97530 THERAPEUTIC ACTIVITIES: CPT

## 2022-11-01 PROCEDURE — 25500020 PHARM REV CODE 255: Performed by: INTERNAL MEDICINE

## 2022-11-01 PROCEDURE — 99497 PR ADVNCD CARE PLAN 30 MIN: ICD-10-PCS | Mod: 25,,,

## 2022-11-01 PROCEDURE — 80053 COMPREHEN METABOLIC PANEL: CPT | Performed by: NURSE PRACTITIONER

## 2022-11-01 PROCEDURE — 96372 THER/PROPH/DIAG INJ SC/IM: CPT | Mod: 59 | Performed by: NURSE PRACTITIONER

## 2022-11-01 PROCEDURE — 97165 OT EVAL LOW COMPLEX 30 MIN: CPT

## 2022-11-01 PROCEDURE — 85007 BL SMEAR W/DIFF WBC COUNT: CPT | Performed by: NURSE PRACTITIONER

## 2022-11-01 PROCEDURE — 83735 ASSAY OF MAGNESIUM: CPT | Performed by: NURSE PRACTITIONER

## 2022-11-01 PROCEDURE — 85027 COMPLETE CBC AUTOMATED: CPT | Performed by: NURSE PRACTITIONER

## 2022-11-01 PROCEDURE — 83615 LACTATE (LD) (LDH) ENZYME: CPT | Performed by: STUDENT IN AN ORGANIZED HEALTH CARE EDUCATION/TRAINING PROGRAM

## 2022-11-01 PROCEDURE — 82232 ASSAY OF BETA-2 PROTEIN: CPT | Performed by: STUDENT IN AN ORGANIZED HEALTH CARE EDUCATION/TRAINING PROGRAM

## 2022-11-01 PROCEDURE — 36415 COLL VENOUS BLD VENIPUNCTURE: CPT | Performed by: NURSE PRACTITIONER

## 2022-11-01 PROCEDURE — 97162 PT EVAL MOD COMPLEX 30 MIN: CPT

## 2022-11-01 PROCEDURE — 99497 ADVNCD CARE PLAN 30 MIN: CPT | Mod: 25,,,

## 2022-11-01 PROCEDURE — 99205 PR OFFICE/OUTPT VISIT, NEW, LEVL V, 60-74 MIN: ICD-10-PCS | Mod: ,,,

## 2022-11-01 PROCEDURE — 99214 OFFICE O/P EST MOD 30 MIN: CPT | Mod: ,,, | Performed by: INTERNAL MEDICINE

## 2022-11-01 RX ORDER — GADOBUTROL 604.72 MG/ML
8 INJECTION INTRAVENOUS
Status: COMPLETED | OUTPATIENT
Start: 2022-11-01 | End: 2022-11-01

## 2022-11-01 RX ADMIN — HYDROCODONE BITARTRATE AND ACETAMINOPHEN 1 TABLET: 5; 325 TABLET ORAL at 08:11

## 2022-11-01 RX ADMIN — LOSARTAN POTASSIUM 100 MG: 50 TABLET, FILM COATED ORAL at 08:11

## 2022-11-01 RX ADMIN — INSULIN ASPART 2 UNITS: 100 INJECTION, SOLUTION INTRAVENOUS; SUBCUTANEOUS at 05:11

## 2022-11-01 RX ADMIN — GADOBUTROL 8 ML: 604.72 INJECTION INTRAVENOUS at 02:11

## 2022-11-01 RX ADMIN — ENOXAPARIN SODIUM 80 MG: 100 INJECTION SUBCUTANEOUS at 08:11

## 2022-11-01 RX ADMIN — ATORVASTATIN CALCIUM 80 MG: 40 TABLET, FILM COATED ORAL at 08:11

## 2022-11-01 NOTE — PROGRESS NOTES
[facility]  Hematology/Oncology  Progress Note    Patient Name: Joi Csatellanos  Admission Date: 10/31/2022  Hospital Length of Stay: 0 days  Code Status: Full Code     Subjective:     HPI:  77 year-old male with right humerus fracture, anemia was admitted on 10/31/22 for deep vein thrombosis. Concern is for possible pathologic fracture, malignancy.      Interval History:   - he feels about the same today. He is interested in being discharged, but he also wants a biopsy to occur before he gets discharged.    Oncology Treatment Plan:   [No matching plan found]    Medications:  Continuous Infusions:  Scheduled Meds:   atorvastatin  80 mg Oral QHS    enoxaparin  1 mg/kg Subcutaneous Q12H    losartan  100 mg Oral Daily     PRN Meds:acetaminophen, dextrose 10%, dextrose 10%, glucagon (human recombinant), glucose, glucose, HYDROcodone-acetaminophen, insulin aspart U-100, naloxone, ondansetron, sodium chloride 0.9%     Review of Systems   Constitutional:  Positive for fatigue and unexpected weight change.   HENT:  Negative for sore throat.    Eyes:  Negative for visual disturbance.   Respiratory:  Positive for shortness of breath.    Cardiovascular:  Negative for chest pain.   Gastrointestinal:  Positive for constipation. Negative for abdominal pain.   Genitourinary:  Negative for dysuria.   Musculoskeletal:  Positive for back pain.   Skin:  Negative for rash.   Neurological:  Positive for weakness. Negative for headaches.   Hematological:  Negative for adenopathy.   Psychiatric/Behavioral:  The patient is not nervous/anxious.    Objective:     Vital Signs (Most Recent):  Temp: 97.6 °F (36.4 °C) (11/01/22 1213)  Pulse: 89 (11/01/22 1213)  Resp: 16 (11/01/22 1213)  BP: (!) 112/56 (11/01/22 1213)  SpO2: 97 % (11/01/22 1213)   Vital Signs (24h Range):  Temp:  [97.5 °F (36.4 °C)-99.1 °F (37.3 °C)] 97.6 °F (36.4 °C)  Pulse:  [] 89  Resp:  [13-20] 16  SpO2:  [92 %-100 %] 97 %  BP: (112-177)/(56-74) 112/56      Weight: 77.5 kg (170 lb 13.7 oz)  Body mass index is 21.94 kg/m².  Body surface area is 2.01 meters squared.      Intake/Output Summary (Last 24 hours) at 11/1/2022 1245  Last data filed at 11/1/2022 0957  Gross per 24 hour   Intake 600 ml   Output 2600 ml   Net -2000 ml       Physical Exam  Vitals and nursing note reviewed.   Constitutional:       Appearance: He is well-developed.      Comments: Fatigued   HENT:      Head: Normocephalic and atraumatic.   Eyes:      Pupils: Pupils are equal, round, and reactive to light.   Cardiovascular:      Rate and Rhythm: Normal rate and regular rhythm.   Pulmonary:      Effort: Pulmonary effort is normal.      Breath sounds: Normal breath sounds.   Abdominal:      General: Bowel sounds are normal.      Palpations: Abdomen is soft.   Musculoskeletal:         General: Normal range of motion.      Cervical back: Normal range of motion and neck supple.   Skin:     General: Skin is warm and dry.   Neurological:      Mental Status: He is alert and oriented to person, place, and time.   Psychiatric:         Behavior: Behavior normal.         Thought Content: Thought content normal.         Judgment: Judgment normal.       Significant Labs:   CBC:   Recent Labs   Lab 10/31/22  1407 11/01/22  0505   WBC 13.65* 12.16   HGB 8.3* 7.4*   HCT 25.9* 22.1*   * 90*    and CMP:   Recent Labs   Lab 10/31/22  1407 11/01/22  0505   * 133*   K 4.6 4.3   CL 95 97   CO2 24 24   * 127*   BUN 15 14   CREATININE 0.7 0.7   CALCIUM 9.3 8.6*   PROT 7.9 6.8   ALBUMIN 3.0* 2.6*   BILITOT 0.6 0.6   ALKPHOS 516* 450*   AST 34 33   ALT 33 32   ANIONGAP 14 12       Diagnostic Results:  CT chest/abdomen/pelvis (10/31/22): I have personally reviewed the images  Chest: There is a small moderate right pleural effusion.  Right lung lower lobe masses are noted with dominant mass measuring 6 by 3 cm in the right lower lobe.  There is right lower lobe consolidation and compressive atelectasis  limiting evaluation.  Left lung appears clear.     There is a right hilar mass or metastatic adenopathy measuring 3.6 cm.  There is right paratracheal 2 cm lymph node as well as precarinal 1.8 cm lymph node.  Matted adenopathy in the subcarinal region as well as AP window and left hilum.     The heart is not enlarged.  No pericardial effusion.     Abdomen pelvis: Innumerable granulomas are noted in the liver and the spleen.  No focal lesions are appreciated.     Gallbladder is unremarkable.     Pancreas, adrenal glands and kidneys appear normal.     Bowel is unremarkable.  Bladder appears normal.  Prostate is enlarged.     The imaged cervical, thoracic and lumbosacral spine as well as the is the scapula bilaterally, sternum,  pelvic bones and ribs demonstrated innumerable metastatic lesions with target lesions, sclerotic foci as well as a L1 compression fracture.  No subluxation.     Impression:     Mediastinal and hilar adenopathy with right hilar mass as well as 6 cm the 3.6 cm right lower lobe mass as described suspicious for metastatic lesions.  Correlate for primary malignancy.     Right mild to moderate pleural effusion.     No focal visceral abdominal lesions .     Innumerable metastatic lesions throughout the skeletal system.    Assessment/Plan:     Mass of right lung  - CT scan (10/31/22) revealed a right lung mass, mediastinal lymphadenopathy, pleural effusion.  - recommend pulmonology consult for consideration of bronchoscopy and/or thoracentesis. Question is what would be safest to biopsy in the setting of anticoagulation. Defer that decision to interventional radiology and pulmonology.  - consider MRI brain since imaging is concerning for metastatic lung cancer/malignancy.    Bone lesion  - x-ray right humerus revealed what appears to be a pathologic fracture  - chest x-ray revealed blunting of right costophrenic angle concerning for pleural effusion  - lumbar x-ray (my interpretation) perhaps reveals  bone lesions  - CT scan (10/31/22) revealed a right lung mass, mediastinal lymphadenopathy, pleural effusion.  - see above.    Anemia of chronic disease  - iron studies suggest anemia of chronic disease/anemia of neoplastic disease  - follow up myeloma studies.        Thank you for your consult.      Rj Wilson MD  Hematology/Oncology  [facility]

## 2022-11-01 NOTE — ASSESSMENT & PLAN NOTE
Patient's FSGs are controlled on current medication regimen.  Last A1c reviewed-   Lab Results   Component Value Date    HGBA1C 6.1 (H) 09/08/2022     Most recent fingerstick glucose reviewed-   Recent Labs   Lab 10/31/22  1700 10/31/22  2020 11/01/22  0510 11/01/22  1211   POCTGLUCOSE 150* 127* 130* 187*     Current correctional scale  Low    Hold Oral hypoglycemics while patient is in the hospital.

## 2022-11-01 NOTE — CONSULTS
"[facility] Surg  Palliative Medicine  Consult Note    Patient Name: Joi Castellanos  MRN: [id number]  Admission Date: 10/31/2022  Hospital Length of Stay: 0 days  Code Status: Full Code   Attending Provider: Adrian Ramos MD  Consulting Provider: Jerri Zamora NP  Primary Care Physician: Marc Lakhani MD  Principal Problem:DVT (deep venous thrombosis)    Patient information was obtained from patient, spouse/SO, relative(s), past medical records and primary team.      Inpatient consult to Palliative Care  Consult performed by: Jerri Zamora NP  Consult ordered by: Kesha Higgins NP  Reason for consult: Goals of Care        Assessment/Plan:     Palliative care encounter   At time of initial consult, pt sitting up in the bedside chair with wife and daughter at the bedside.  Pt has been  for 50 years.  Pts wife, Cally, would be his legal MPOA if he were unable to make decisions.  Pt has 3 grown children and 5 grandchildren.  Pt is retired from the [employer].  Pt is independent of all ADLS.    Palliative introduced and family receptive to consult at this time.  Pt reports that the hardest part of everything right now is just waiting for answers and a treatment plan.  Emotional support offered.  Pt endorses that he is "Going to do everything I can to fight this.  I have always been a fighter and I still am one."  We discussed goals of care.  At this time, pt wants all aggressive treatment options.  Pt remains a full code as well.   Pts family endorses that he has a large support system and they will help him beat this. Pt and family still struggling with this new found malignancy.      Pt would be appropriate to follow up with palliative in the outpt clinic setting as treatment options are discussed and started. .  Pt and wife both in agreement.     Subjective:     HPI:   Per chart. "76 yo male with a PMH of DM II, HLD, Hip replacement 2019, noted pathologic R humerus fx 10/26/22 presents today " with LLE edema for about a week. He notes the edema to be prior to the fx occurring. Prior to the noted fracture, he states he caught his weight on the R arm after slipping back slightly while on the toilet.. Since 10/26 he has had prolonged sitting. He notes about 25 pounds of unintentional weight loss over the past 6 months. He notes R lumbar aching pain, constant for the past few days. He denies HA, vision changes, dizziness, lightheadedness, CP, SOB, abdominal pain, n/v/d/f/c/c, numbness, tingling, recent travel, melena, hemoptysis, hematemesis.      Family hx mother: cancer with spinal tumors     He has a follow up appointment with Hemoc planned for 11/4/22 for suspected possible malignancy.          Hospital Course:: he denies new complaints today. He states he feels ok.   Noted R hilar, RLL mass   PSA WNL   IGA elevated   Multiple bone lesions   Hemoc working up MM   Ortho consult   Palliative care consulted     No notes on file    Interval History: PT/OT following pt, Oncology recommending pulmonology/IR consult for possible biopsy    Past Medical History:   Diagnosis Date    Arthritis     Diabetes mellitus, type II     fasting bg criteria    Hyperlipidemia        Past Surgical History:   Procedure Laterality Date    TONSILLECTOMY         Review of patient's allergies indicates:   Allergen Reactions    Asa [aspirin]      Patient states he doesn't have allergy to medication       Medications:  Continuous Infusions:  Scheduled Meds:   atorvastatin  80 mg Oral QHS    enoxaparin  1 mg/kg Subcutaneous Q12H    losartan  100 mg Oral Daily     PRN Meds:acetaminophen, dextrose 10%, dextrose 10%, glucagon (human recombinant), glucose, glucose, HYDROcodone-acetaminophen, insulin aspart U-100, naloxone, ondansetron, sodium chloride 0.9%    Family History       Problem Relation (Age of Onset)    Arthritis Sister    Breast cancer Maternal Aunt    Cancer Mother    Deep vein thrombosis Father    Emphysema Father     Fibromyalgia Sister    Heart attack Father    Hypertension Mother    No Known Problems Daughter, Son    Osteoarthritis Mother    Pulmonary embolism Father          Tobacco Use    Smoking status: Never    Smokeless tobacco: Never   Substance and Sexual Activity    Alcohol use: Yes     Comment: Occasionally    Drug use: No    Sexual activity: Yes     Partners: Female       Review of Systems   Constitutional:  Positive for activity change, appetite change, fatigue and unexpected weight change. Negative for diaphoresis and fever.   HENT: Negative.     Eyes: Negative.    Respiratory: Negative.  Negative for cough and shortness of breath.    Cardiovascular:  Positive for leg swelling.   Gastrointestinal: Negative.    Genitourinary: Negative.    Musculoskeletal:  Positive for myalgias.   Neurological:  Positive for weakness.   Objective:     Vital Signs (Most Recent):  Temp: 97.6 °F (36.4 °C) (11/01/22 1213)  Pulse: 89 (11/01/22 1213)  Resp: 16 (11/01/22 1213)  BP: (!) 112/56 (11/01/22 1213)  SpO2: 97 % (11/01/22 1213)   Vital Signs (24h Range):  Temp:  [97.5 °F (36.4 °C)-99.1 °F (37.3 °C)] 97.6 °F (36.4 °C)  Pulse:  [] 89  Resp:  [13-20] 16  SpO2:  [92 %-100 %] 97 %  BP: (112-177)/(56-74) 112/56     Weight: 77.5 kg (170 lb 13.7 oz)  Body mass index is 21.94 kg/m².    Physical Exam  Vitals and nursing note reviewed. Exam conducted with a chaperone present.   Constitutional:       Appearance: He is ill-appearing.   HENT:      Head: Normocephalic.      Nose: Nose normal.   Cardiovascular:      Rate and Rhythm: Normal rate and regular rhythm.      Pulses: Normal pulses.   Pulmonary:      Effort: Pulmonary effort is normal.      Breath sounds: Decreased air movement present.   Abdominal:      General: Abdomen is flat.      Palpations: Abdomen is soft.   Musculoskeletal:      Left lower leg: Edema present.   Skin:     General: Skin is warm and dry.      Capillary Refill: Capillary refill takes less than 2 seconds.    Neurological:      General: No focal deficit present.      Mental Status: He is alert.   Psychiatric:         Mood and Affect: Mood normal.         Behavior: Behavior normal.         Thought Content: Thought content normal.       Review of Symptoms      Symptom Assessment (ESAS 0-10 Scale)  Pain:  0  Dyspnea:  0  Anxiety:  0  Nausea:  0  Depression:  0  Anorexia:  0  Fatigue:  0  Insomnia:  0  Restlessness:  0  Agitation:  0     CAM / Delirium:  Negative  Constipation:  Negative  Diarrhea:  Negative      Performance Status:  60    Living Arrangements:  Lives with spouse and Lives in home    Psychosocial/Cultural: Pt lives at home with his wife.     Spiritual:  F - Inge and Belief:  Anglican   I - Importance:  Important   C - Community:  Family support   A - Address in Care:  Pastoral visits prn      Time-Based Charting:  Yes  Chart Review: 16 minutes  Face to Face: 21 minutes  Symptom Assessment: 9 minutes  Coordination of Care: 15 minutes  Discharge Planning: 10 minutes  Advance Care Plannin minutes  Goals of Care: 8 minutes    Total Time Spent: 87 minutes      Advance Care Planning   Advance Directives:   Living Will: No    LaPOST: No    Do Not Resuscitate Status: No    Medical Power of : Yes    Agent's Name:  Cally Castellanos   Agent's Contact Number:  [phone]    Decision Making:  Patient answered questions and Family answered questions  Goals of Care: The patient and family endorses that what is most important right now is to focus on remaining as independent as possible, symptom/pain control, and extending life as long as possible, even it it means sacrificing quality    Accordingly, we have decided that the best plan to meet the patient's goals includes continuing with treatment       Significant Labs: All pertinent labs within the past 24 hours have been reviewed.  CBC:   Recent Labs   Lab 22  0505   WBC 12.16   HGB 7.4*   HCT 22.1*   MCV 99*   PLT 90*     BMP:  Recent Labs   Lab  11/01/22  0505   *   *   K 4.3   CL 97   CO2 24   BUN 14   CREATININE 0.7   CALCIUM 8.6*   MG 2.0     LFT:  Lab Results   Component Value Date    AST 33 11/01/2022    ALKPHOS 450 (H) 11/01/2022    BILITOT 0.6 11/01/2022     Albumin:   Albumin   Date Value Ref Range Status   11/01/2022 2.6 (L) 3.5 - 5.2 g/dL Final     Protein:   Total Protein   Date Value Ref Range Status   11/01/2022 6.8 6.0 - 8.4 g/dL Final     Lactic acid:   No results found for: LACTATE    Significant Imaging: I have reviewed all pertinent imaging results/findings within the past 24 hours.      Jerri Zamora NP  Palliative Medicine  [facility]    Advance Care Planning     Date: 11/01/2022    [facility]  I engaged the patient and family in a conversation about advance care planning and we specifically addressed what the goals of care would be moving forward, in light of the patient's change in clinical status, specifically DVT with new found malignancies .  We did not specifically address the patient's likely prognosis, which is fair .  We explored the patient's values and preferences for future care.  The patient and family endorses that what is most important right now is to focus on spending time at home, symptom/pain control, extending life as long as possible, even it it means sacrificing quality and curative/life-prolongation (regardless of treatment burdens)    Accordingly, we have decided that the best plan to meet the patient's goals includes continuing with treatment    I did not explain the role for hospice care at this stage of the patient's illness, including its ability to help the patient live with the best quality of life possible.  We will not be making a hospice referral.    I spent a total of 16 minutes engaging the patient in this advance care planning discussion.

## 2022-11-01 NOTE — ASSESSMENT & PLAN NOTE
- x-ray right humerus revealed what appears to be a pathologic fracture  - chest x-ray revealed blunting of right costophrenic angle concerning for pleural effusion  - lumbar x-ray (my interpretation) perhaps reveals bone lesions  - CT scan (10/31/22) revealed a right lung mass, mediastinal lymphadenopathy, pleural effusion.  - see above.

## 2022-11-01 NOTE — ASSESSMENT & PLAN NOTE
Patient has a current diagnosis of HTN which is uncontrolled.  Latest blood pressure and vitals reviewed-   Temp:  [97.5 °F (36.4 °C)-99.1 °F (37.3 °C)]   Pulse:  []   Resp:  [13-20]   BP: (112-177)/(56-74)   SpO2:  [92 %-100 %] .   Patient currently off IV antihypertensives.   Home meds for hypertension were reviewed and noted below.   Hypertension Medications             losartan (COZAAR) 100 MG tablet TAKE 1 TABLET BY MOUTH EVERY DAY          Medication adjustment for hospital antihypertensives is as follows- resume losartan     Will goal for controlled BP reduction as noted be medications noted above and monitor and mitigate end organ damage as indicated.

## 2022-11-01 NOTE — CONSULTS
[facility] Consult Note     Chief Complaint:  Right arm pain     HPI:  77-year-old male otherwise healthy came in last Wednesday after a incident where he was sitting back down the toilet and sustained immediate pain to the right upper extremity.  No to have what appeared to be a pathologic fracture of the right humerus was placed in a splint and given follow-up with his orthopedic surgeon.   Patient had ultrasound done yesterday noted to have blood clots was advised to report to the emergency department for further evaluation.  Admitted for treatment.  Also now with metastatic disease workup.    Patient has a weight loss of about 25 lb over the past 6 months.  Has noted that he has been weaker but no specific pain to the right upper extremity.  Noted a little bit of back pain when getting around but this was only with certain positions he is able to get around without issue without having back pain and was still going about his day-to-day activities.      Denies numbness or tingling. No fevers or chills, no nausea or vomiting.       PMH:    Past Medical History:   Diagnosis Date    Arthritis     Diabetes mellitus, type II     fasting bg criteria    Hyperlipidemia      PSH:    Past Surgical History:   Procedure Laterality Date    TONSILLECTOMY       Meds:    No current facility-administered medications on file prior to encounter.     Current Outpatient Medications on File Prior to Encounter   Medication Sig Dispense Refill    acetaminophen (TYLENOL) 500 MG tablet Take 500 mg by mouth every 6 (six) hours as needed for Pain.      fluticasone propionate (FLONASE) 50 mcg/actuation nasal spray 2 sprays (100 mcg total) by Each Nostril route once daily. 16 g 11    losartan (COZAAR) 100 MG tablet TAKE 1 TABLET BY MOUTH EVERY DAY 90 tablet 3    oxyCODONE-acetaminophen (PERCOCET) 5-325 mg per tablet Take 1 tablet by mouth every 6 (six) hours as needed for Pain. 12 tablet 0    vitamin D (VITAMIN D3) 1000 units Tab Take 1,000  "Units by mouth once daily.      co-enzyme Q-10 30 mg capsule Take 30 mg by mouth once daily.      methocarbamoL (ROBAXIN) 500 MG Tab Take 2 tablets (1,000 mg total) by mouth 3 (three) times daily. for 5 days 30 tablet 0    rosuvastatin (CRESTOR) 20 MG tablet TAKE 1 TABLET BY MOUTH EVERY DAY 90 tablet 3    zinc gluconate 50 mg tablet Take 50 mg by mouth once daily. PRN         Allergies:    Review of patient's allergies indicates:   Allergen Reactions    Asa [aspirin]      Patient states he doesn't have allergy to medication        ROS:  otherwise negative except indicated in HPI      Exam:  Vitals:  BP (!) 112/56   Pulse 89   Temp 97.6 °F (36.4 °C)   Resp 16   Ht 6' 2" (1.88 m)   Wt 77.5 kg (170 lb 13.7 oz)   SpO2 97%   BMI 21.94 kg/m²   Gen:  Awake and alert, NAD  Resp: No increased WOB  Cards: RRR by PP  Abd:  Non-distended, benign    Right UE:  No abrasions or open wounds  Moderate swelling   Expected TTP at fx site  5/5 AIN/PIN/U  SILT M/R/U  2+ RP      Imaging:  XR R humerus: proximal 1/3 pathologic humeral shaft fracture  CT Chest abd pelvis:   Mediastinal and hilar adenopathy with right hilar mass as well as 6 cm the 3.6 cm right lower lobe mass as described suspicious for metastatic lesions. Correlate for primary malignancy.  Right mild to moderate pleural effusion.  No focal visceral abdominal lesions .  Innumerable metastatic lesions throughout the skeletal system.     Assessment/Plan:  77M with pathologic proximal 1/3 Right humeral shaft fracture, unknown primary at this point, possible lung vs MM vs both. Admitted due to DVT found 11/1.    -pt admitted to family medicine  -discussed that pt has multiple metastatic lesions with unknown primary, lab work up and imaging still inconclusive at this point  -Bone scan ordered  -Beta 2 microglobulin and immunoglobulin pending  -For fracture we discussed with staff, Dr. Dennis and also spoke with our ortho onc staff Dr. Merino for his thoughts, He would " continue work up for primary source and we will have patient follow up with Dr. Merino in clinic in 1-2 weeks to likely be booked for surgery for IM nail R humerus.   -in the meantime we ordered a Lees brace with proximal extension, please obtain before discharge, until brace placed he shoulder remain NWB in a Sling    - Recommend IR biopsy (If biopsy of R humerus lesion is most accessible please use lateral incision + a bone marrow biopsy)  - NPO midnight for IR biopsy  - if possible hold DVT until after IR biopsy

## 2022-11-01 NOTE — PLAN OF CARE
Pt would benefit from cont OT services in order to maximize functional independence. Recommending TBD, likely HH OT/PT with 24/7 care/assistance. Pt with RUE in brace throughout session. Pt performing functional mobility in room with CGA/Bravo & no AD. Will progress as able.     Problem: Occupational Therapy  Goal: Occupational Therapy Goal  Description: Goals to be met by: 21/1/2022     Patient will increase functional independence with ADLs by performing:    LE Dressing with Minimal Assistance.  Toileting from toilet with Stand-by Assistance for hygiene and clothing management.   Supine to sit with Modified [place].  Step transfer with Supervision & appropriate AD.  Toilet transfer to toilet with Supervision & appropriate AD.    Outcome: Ongoing, Progressing

## 2022-11-01 NOTE — ASSESSMENT & PLAN NOTE
Hilar/RLL mass   PSA WNL   IGA elevated   Anemia   New BLE DVTs in hypercoagulable state   Multiple bone lesions   Hemoc on board, working up Multiple myeloma   -palliative care consult

## 2022-11-01 NOTE — ASSESSMENT & PLAN NOTE
- iron studies suggest anemia of chronic disease/anemia of neoplastic disease  - follow up myeloma studies.

## 2022-11-01 NOTE — SUBJECTIVE & OBJECTIVE
Interval History:   - he feels about the same today. He is interested in being discharged, but he also wants a biopsy to occur before he gets discharged.    Oncology Treatment Plan:   [No matching plan found]    Medications:  Continuous Infusions:  Scheduled Meds:   atorvastatin  80 mg Oral QHS    enoxaparin  1 mg/kg Subcutaneous Q12H    losartan  100 mg Oral Daily     PRN Meds:acetaminophen, dextrose 10%, dextrose 10%, glucagon (human recombinant), glucose, glucose, HYDROcodone-acetaminophen, insulin aspart U-100, naloxone, ondansetron, sodium chloride 0.9%     Review of Systems   Constitutional:  Positive for fatigue and unexpected weight change.   HENT:  Negative for sore throat.    Eyes:  Negative for visual disturbance.   Respiratory:  Positive for shortness of breath.    Cardiovascular:  Negative for chest pain.   Gastrointestinal:  Positive for constipation. Negative for abdominal pain.   Genitourinary:  Negative for dysuria.   Musculoskeletal:  Positive for back pain.   Skin:  Negative for rash.   Neurological:  Positive for weakness. Negative for headaches.   Hematological:  Negative for adenopathy.   Psychiatric/Behavioral:  The patient is not nervous/anxious.    Objective:     Vital Signs (Most Recent):  Temp: 97.6 °F (36.4 °C) (11/01/22 1213)  Pulse: 89 (11/01/22 1213)  Resp: 16 (11/01/22 1213)  BP: (!) 112/56 (11/01/22 1213)  SpO2: 97 % (11/01/22 1213)   Vital Signs (24h Range):  Temp:  [97.5 °F (36.4 °C)-99.1 °F (37.3 °C)] 97.6 °F (36.4 °C)  Pulse:  [] 89  Resp:  [13-20] 16  SpO2:  [92 %-100 %] 97 %  BP: (112-177)/(56-74) 112/56     Weight: 77.5 kg (170 lb 13.7 oz)  Body mass index is 21.94 kg/m².  Body surface area is 2.01 meters squared.      Intake/Output Summary (Last 24 hours) at 11/1/2022 1245  Last data filed at 11/1/2022 0957  Gross per 24 hour   Intake 600 ml   Output 2600 ml   Net -2000 ml       Physical Exam  Vitals and nursing note reviewed.   Constitutional:       Appearance: He is  well-developed.      Comments: Fatigued   HENT:      Head: Normocephalic and atraumatic.   Eyes:      Pupils: Pupils are equal, round, and reactive to light.   Cardiovascular:      Rate and Rhythm: Normal rate and regular rhythm.   Pulmonary:      Effort: Pulmonary effort is normal.      Breath sounds: Normal breath sounds.   Abdominal:      General: Bowel sounds are normal.      Palpations: Abdomen is soft.   Musculoskeletal:         General: Normal range of motion.      Cervical back: Normal range of motion and neck supple.   Skin:     General: Skin is warm and dry.   Neurological:      Mental Status: He is alert and oriented to person, place, and time.   Psychiatric:         Behavior: Behavior normal.         Thought Content: Thought content normal.         Judgment: Judgment normal.       Significant Labs:   CBC:   Recent Labs   Lab 10/31/22  1407 11/01/22  0505   WBC 13.65* 12.16   HGB 8.3* 7.4*   HCT 25.9* 22.1*   * 90*    and CMP:   Recent Labs   Lab 10/31/22  1407 11/01/22  0505   * 133*   K 4.6 4.3   CL 95 97   CO2 24 24   * 127*   BUN 15 14   CREATININE 0.7 0.7   CALCIUM 9.3 8.6*   PROT 7.9 6.8   ALBUMIN 3.0* 2.6*   BILITOT 0.6 0.6   ALKPHOS 516* 450*   AST 34 33   ALT 33 32   ANIONGAP 14 12       Diagnostic Results:  CT chest/abdomen/pelvis (10/31/22): I have personally reviewed the images  Chest: There is a small moderate right pleural effusion.  Right lung lower lobe masses are noted with dominant mass measuring 6 by 3 cm in the right lower lobe.  There is right lower lobe consolidation and compressive atelectasis limiting evaluation.  Left lung appears clear.     There is a right hilar mass or metastatic adenopathy measuring 3.6 cm.  There is right paratracheal 2 cm lymph node as well as precarinal 1.8 cm lymph node.  Matted adenopathy in the subcarinal region as well as AP window and left hilum.     The heart is not enlarged.  No pericardial effusion.     Abdomen pelvis:  Innumerable granulomas are noted in the liver and the spleen.  No focal lesions are appreciated.     Gallbladder is unremarkable.     Pancreas, adrenal glands and kidneys appear normal.     Bowel is unremarkable.  Bladder appears normal.  Prostate is enlarged.     The imaged cervical, thoracic and lumbosacral spine as well as the is the scapula bilaterally, sternum,  pelvic bones and ribs demonstrated innumerable metastatic lesions with target lesions, sclerotic foci as well as a L1 compression fracture.  No subluxation.     Impression:     Mediastinal and hilar adenopathy with right hilar mass as well as 6 cm the 3.6 cm right lower lobe mass as described suspicious for metastatic lesions.  Correlate for primary malignancy.     Right mild to moderate pleural effusion.     No focal visceral abdominal lesions .     Innumerable metastatic lesions throughout the skeletal system.

## 2022-11-01 NOTE — TREATMENT PLAN
PCC nurse attempted to see patient to discuss PCC appt and clinic info; spouse at bedside requested nurse to come back tomorrow when patient is availble.    Zeynep VuRN-[facility]  [phone]    Future Appointments   Date Time Provider Department Center   11/4/2022  9:00 AM Sherri Jane NP [facility] HEM ONC [facility]   11/15/2022  1:30 PM Brittanie Gonzalez MD [facility] IMPRI [facility]

## 2022-11-01 NOTE — ASSESSMENT & PLAN NOTE
- CT scan (10/31/22) revealed a right lung mass, mediastinal lymphadenopathy, pleural effusion.  - recommend pulmonology consult for consideration of bronchoscopy and/or thoracentesis. Question is what would be safest to biopsy in the setting of anticoagulation. Defer that decision to interventional radiology and pulmonology.  - consider MRI brain since imaging is concerning for metastatic lung cancer/malignancy.

## 2022-11-01 NOTE — ASSESSMENT & PLAN NOTE
" At time of initial consult, pt sitting up in the bedside chair with wife and daughter at the bedside.  Pt has been  for 50 years.  Pts wife, Cally, would be his legal MPOA if he were unable to make decisions.  Pt has 3 grown children and 5 grandchildren.  Pt is retired from the [employer].     Palliative introduced and family receptive to consult at this time.  Pt reports that the hardest part of everything right now is just waiting for answers and a treatment plan.  Emotional support offered.  Pt endorses that he is "Going to do everything I can to fight this.  I have always been a fighter and I still am one."  We discussed goals of care.  At this time, pt wants all aggressive treatment options.  Pt remains a full code as well.      Pt would be appropriate to follow up with palliative in the outpt clinic setting.  Pt and wife both in agreement.   "

## 2022-11-01 NOTE — PT/OT/SLP EVAL
"Physical Therapy Evaluation    Patient Name:  Joi Castellanos   MRN:  [id number]    Recommendations:     Discharge Recommendations:  home health OT, home health PT with 24/7 caregiver assist  Discharge Equipment Recommendations: none   Barriers to discharge:  Patient currently requiring assist for functional mobility    Assessment:     Joi Castellanos is a 77 y.o. male admitted with a medical diagnosis of DVT (deep venous thrombosis).  He presents with the following impairments/functional limitations:  weakness, impaired functional mobility, decreased safety awareness, gait instability, impaired endurance, impaired balance, decreased upper extremity function, impaired self care skills, decreased lower extremity function, edema, orthopedic precautions Patient seen for physical therapy evaluation on this date.  Patient will benefit from inpatient physical therapy to address limitations.    Anticipate patient will be able to return home with Home Health PT/OT and 24/7 supervision/assist from caregiver.   No DME needs..    Rehab Prognosis: Good; patient would benefit from acute skilled PT services to address these deficits and reach maximum level of function.    Recent Surgery: * No surgery found *      Plan:     During this hospitalization, patient to be seen 5 x/week to address the identified rehab impairments via gait training, therapeutic activities, therapeutic exercises, neuromuscular re-education and progress toward the following goals:    Plan of Care Expires:  12/01/22    Subjective     Chief Complaint: "I've had a rough week, but I would love to get up"  Patient/Family Comments/goals: To return home at [facility]  Pain/Comfort:  Pain Rating 1: 0/10  Pain Rating Post-Intervention 1: 0/10    Patients cultural, spiritual, Amish conflicts given the current situation:      Living Environment:  Patient lives with spouse (does not work) in a 1 SH, threshold to enter home, WIS with built in seat in bathroom.    Prior to " admission, patients level of function was prior to UE fracture, patient was independent with mobility, required assist from wife for lower body dressing since hip surgery. + Driving.  Equipment used at home: walker, rolling, cane, straight, shower chair, bedside commode.  DME owned (not currently used): rolling walker.  Upon discharge, patient will have assistance from spouse.    Objective:     Communicated with nurse Mckeon prior to session.  Patient found supine with bed alarm, peripheral IV  upon PT entry to room.    General Precautions: Standard, fall   Orthopedic Precautions:RUE non weight bearing   Braces: Sling and swathe (R UE)  Respiratory Status: Room air    Exams:  Cognitive Exam:  Patient is oriented to Person, Place, Time, and Situation  Gross Motor Coordination:  WFL  Postural Exam:  Patient presented with the following abnormalities:    -       Rounded shoulders  -       Forward head  Sensation:    -       Intact  light/touch B LEs  Skin Integrity/Edema:      -       Skin integrity: Visible skin intact  -       Edema: Mild B LEs  RLE ROM: WFL  RLE Strength: WFL  LLE ROM: WFL  LLE Strength: WFL    Functional Mobility:  Bed Mobility:     Supine to Sit: moderate assistance  Transfers:     Sit to Stand:  contact guard assistance with no AD  Gait: on level surfaces x 40' with CG/Min A, lateral sway, slightly unsteady  Balance: Seated EOB:  SBA, posterior lean at times.  Standing: unsteady, no overt LOB, requires CG/Min A      AM-PAC 6 CLICK MOBILITY  Total Score:14       Treatment & Education:  Patient agreeable to therapy.  Patient educated on role  of physical therapy and POC.  Patient with sling and swathe in place on R UE, mild edema B LEs.  Patient transitions to side of bed with moderate assist, slow pace, VC's for technique.  Patient performs sit to stand with CGA, no AD.  Gait as above.  Patient educated to perform B LE exercises while seated in bedside chair.    Patient left up in chair with all  lines intact, call button in reach, chair alarm on, and nurse notified.    GOALS:   Multidisciplinary Problems       Physical Therapy Goals          Problem: Physical Therapy    Goal Priority Disciplines Outcome Goal Variances Interventions   Physical Therapy Goal     PT, PT/OT Ongoing, Progressing     Description: Goals to be met by: 2022     Patient will increase functional independence with mobility by performin. Supine to sit with Modified [place]  2. Sit to supine with Modified [place]  3. Rolling to Left and Right with Modified [place].  4. Sit to stand transfer with Supervision  5. Gait  x 150 feet with Supervision using Single-point Cane .   6. Ascend/descend 1 stair  Supervision using Single-point Cane .                          History:     Past Medical History:   Diagnosis Date    Arthritis     Diabetes mellitus, type II     fasting bg criteria    Hyperlipidemia        Past Surgical History:   Procedure Laterality Date    TONSILLECTOMY         Time Tracking:     PT Received On: 22  PT Start Time: 935     PT Stop Time: 958  PT Total Time (min): 23 min Total Time with Occupational Therapy    Billable Minutes: Evaluation 15      2022

## 2022-11-01 NOTE — PLAN OF CARE
Patient seen for physical therapy evaluation on this date.  Patient will benefit from inpatient physical therapy to address limitations.  Full report to follow.  Anticipate patient will be able to return home with Home Health PT/OT and  supervision/assist from caregiver. No DME needs.      Problem: Physical Therapy  Goal: Physical Therapy Goal  Description: Goals to be met by: 2022     Patient will increase functional independence with mobility by performin. Supine to sit with Modified [place]  2. Sit to supine with Modified [place]  3. Rolling to Left and Right with Modified [place].  4. Sit to stand transfer with Supervision  5. Gait  x 150 feet with Supervision using Single-point Cane .   6. Ascend/descend 1 stair  Supervision using Single-point Cane .     Outcome: Ongoing, Progressing

## 2022-11-01 NOTE — PLAN OF CARE
PEPEOMATTHEWS,ED admission at 2000.RUE in sling,mild edema noted to LLE ,no c/o cp orsob,poc reviewed stated understanding,bed alarm set.

## 2022-11-01 NOTE — ASSESSMENT & PLAN NOTE
Anemia panel noted 9/2022  Hgb 10.5--> 8.3--> 7.4 over the past 5 months   Will transfuse if < 7 or symptomatic   Continue to monitor   hemoc consulted

## 2022-11-01 NOTE — SUBJECTIVE & OBJECTIVE
Interval History: PT/OT following pt, Oncology recommending pulmonology/IR consult for possible biopsy    Past Medical History:   Diagnosis Date    Arthritis     Diabetes mellitus, type II     fasting bg criteria    Hyperlipidemia        Past Surgical History:   Procedure Laterality Date    TONSILLECTOMY         Review of patient's allergies indicates:   Allergen Reactions    Asa [aspirin]      Patient states he doesn't have allergy to medication       Medications:  Continuous Infusions:  Scheduled Meds:   atorvastatin  80 mg Oral QHS    enoxaparin  1 mg/kg Subcutaneous Q12H    losartan  100 mg Oral Daily     PRN Meds:acetaminophen, dextrose 10%, dextrose 10%, glucagon (human recombinant), glucose, glucose, HYDROcodone-acetaminophen, insulin aspart U-100, naloxone, ondansetron, sodium chloride 0.9%    Family History       Problem Relation (Age of Onset)    Arthritis Sister    Breast cancer Maternal Aunt    Cancer Mother    Deep vein thrombosis Father    Emphysema Father    Fibromyalgia Sister    Heart attack Father    Hypertension Mother    No Known Problems Daughter, Son    Osteoarthritis Mother    Pulmonary embolism Father          Tobacco Use    Smoking status: Never    Smokeless tobacco: Never   Substance and Sexual Activity    Alcohol use: Yes     Comment: Occasionally    Drug use: No    Sexual activity: Yes     Partners: Female       Review of Systems   Constitutional:  Positive for activity change, appetite change, fatigue and unexpected weight change. Negative for diaphoresis and fever.   HENT: Negative.     Eyes: Negative.    Respiratory: Negative.  Negative for cough and shortness of breath.    Cardiovascular:  Positive for leg swelling.   Gastrointestinal: Negative.    Genitourinary: Negative.    Musculoskeletal:  Positive for myalgias.   Neurological:  Positive for weakness.   Objective:     Vital Signs (Most Recent):  Temp: 97.6 °F (36.4 °C) (11/01/22 1213)  Pulse: 89 (11/01/22 1213)  Resp: 16 (11/01/22  1213)  BP: (!) 112/56 (11/01/22 1213)  SpO2: 97 % (11/01/22 1213)   Vital Signs (24h Range):  Temp:  [97.5 °F (36.4 °C)-99.1 °F (37.3 °C)] 97.6 °F (36.4 °C)  Pulse:  [] 89  Resp:  [13-20] 16  SpO2:  [92 %-100 %] 97 %  BP: (112-177)/(56-74) 112/56     Weight: 77.5 kg (170 lb 13.7 oz)  Body mass index is 21.94 kg/m².    Physical Exam  Vitals and nursing note reviewed. Exam conducted with a chaperone present.   Constitutional:       Appearance: He is ill-appearing.   HENT:      Head: Normocephalic.      Nose: Nose normal.   Cardiovascular:      Rate and Rhythm: Normal rate and regular rhythm.      Pulses: Normal pulses.   Pulmonary:      Effort: Pulmonary effort is normal.      Breath sounds: Decreased air movement present.   Abdominal:      General: Abdomen is flat.      Palpations: Abdomen is soft.   Musculoskeletal:      Left lower leg: Edema present.   Skin:     General: Skin is warm and dry.      Capillary Refill: Capillary refill takes less than 2 seconds.   Neurological:      General: No focal deficit present.      Mental Status: He is alert.   Psychiatric:         Mood and Affect: Mood normal.         Behavior: Behavior normal.         Thought Content: Thought content normal.       Review of Symptoms      Symptom Assessment (ESAS 0-10 Scale)  Pain:  0  Dyspnea:  0  Anxiety:  0  Nausea:  0  Depression:  0  Anorexia:  0  Fatigue:  0  Insomnia:  0  Restlessness:  0  Agitation:  0     CAM / Delirium:  Negative  Constipation:  Negative  Diarrhea:  Negative      Performance Status:  60    Living Arrangements:  Lives with spouse and Lives in home    Psychosocial/Cultural: Pt lives at home with his wife.     Spiritual:  F - Inge and Belief:  Anabaptist   I - Importance:  Important   C - Community:  Family support   A - Address in Care:  Pastoral visits prn      Time-Based Charting:  Yes  Chart Review: 16 minutes  Face to Face: 21 minutes  Symptom Assessment: 9 minutes  Coordination of Care: 15 minutes  Discharge  Planning: 10 minutes  Advance Care Plannin minutes  Goals of Care: 8 minutes    Total Time Spent: 87 minutes      Advance Care Planning   Advance Directives:   Living Will: No    LaPOST: No    Do Not Resuscitate Status: No    Medical Power of : Yes    Agent's Name:  Cally Castellanos   Agent's Contact Number:  [phone]    Decision Making:  Patient answered questions and Family answered questions  Goals of Care: The patient and family endorses that what is most important right now is to focus on remaining as independent as possible, symptom/pain control, and extending life as long as possible, even it it means sacrificing quality    Accordingly, we have decided that the best plan to meet the patient's goals includes continuing with treatment       Significant Labs: All pertinent labs within the past 24 hours have been reviewed.  CBC:   Recent Labs   Lab 22  0505   WBC 12.16   HGB 7.4*   HCT 22.1*   MCV 99*   PLT 90*     BMP:  Recent Labs   Lab 22  0505   *   *   K 4.3   CL 97   CO2 24   BUN 14   CREATININE 0.7   CALCIUM 8.6*   MG 2.0     LFT:  Lab Results   Component Value Date    AST 33 2022    ALKPHOS 450 (H) 2022    BILITOT 0.6 2022     Albumin:   Albumin   Date Value Ref Range Status   2022 2.6 (L) 3.5 - 5.2 g/dL Final     Protein:   Total Protein   Date Value Ref Range Status   2022 6.8 6.0 - 8.4 g/dL Final     Lactic acid:   No results found for: LACTATE    Significant Imaging: I have reviewed all pertinent imaging results/findings within the past 24 hours.

## 2022-11-01 NOTE — PROGRESS NOTES
[facility]  Progress Note    Patient Name: Joi Castellanos  MRN: [id number]  Patient Class: OP- Observation   Admission Date: 10/31/2022  Length of Stay: 0 days  Attending Physician: Adrian Ramos MD  Primary Care Provider: Marc Lakhani MD        Subjective:     Principal Problem:DVT (deep venous thrombosis)        HPI:  76 yo male with a PMH of DM II, HLD, Hip replacement 2019, noted pathologic R humerus fx 10/26/22 presents today with LLE edema for about a week. He notes the edema to be prior to the fx occurring. Prior to the noted fracture, he states he caught his weight on the R arm after slipping back slightly while on the toilet.. Since 10/26 he has had prolonged sitting. He notes about 25 pounds of unintentional weight loss over the past 6 months. He notes R lumbar aching pain, constant for the past few days. He denies HA, vision changes, dizziness, lightheadedness, CP, SOB, abdominal pain, n/v/d/f/c/c, numbness, tingling, recent travel, melena, hemoptysis, hematemesis.     Family hx mother: cancer with spinal tumors    He has a follow up appointment with Hemoc planned for 11/4/22 for suspected possible malignancy.       Overview/Hospital Course:  No notes on file    Interval hx: he denies new complaints today. He states he feels ok.   Noted R hilar, RLL mass   PSA WNL   IGA elevated   Multiple bone lesions   Hemoc working up MM   Ortho consult   Palliative care consult.     Review of Systems   Constitutional:  Positive for activity change, appetite change, fatigue and unexpected weight change. Negative for chills, diaphoresis and fever.   HENT:  Negative for trouble swallowing and voice change.    Eyes:  Negative for photophobia and visual disturbance.   Respiratory:  Negative for cough, choking, chest tightness, shortness of breath, wheezing and stridor.    Cardiovascular:  Positive for leg swelling. Negative for chest pain and palpitations.   Gastrointestinal:  Negative for  abdominal pain, constipation, diarrhea, nausea and vomiting.   Genitourinary:  Negative for difficulty urinating, dysuria, enuresis, flank pain and hematuria.   Musculoskeletal:  Positive for arthralgias and back pain. Negative for myalgias.   Skin:  Negative for rash and wound.   Neurological:  Negative for dizziness, tremors, syncope, facial asymmetry, weakness, light-headedness, numbness and headaches.   Hematological:  Negative for adenopathy. Does not bruise/bleed easily.   Objective:     Vital Signs (Most Recent):  Temp: 97.6 °F (36.4 °C) (11/01/22 1213)  Pulse: 89 (11/01/22 1213)  Resp: 16 (11/01/22 1213)  BP: (!) 112/56 (11/01/22 1213)  SpO2: 97 % (11/01/22 1213)   Vital Signs (24h Range):  Temp:  [97.5 °F (36.4 °C)-99.1 °F (37.3 °C)] 97.6 °F (36.4 °C)  Pulse:  [] 89  Resp:  [13-20] 16  SpO2:  [92 %-100 %] 97 %  BP: (112-177)/(56-74) 112/56     Weight: 77.5 kg (170 lb 13.7 oz)  Body mass index is 21.94 kg/m².    Physical Exam  Constitutional:       Appearance: Normal appearance.      Comments: Appears thin    HENT:      Nose: Nose normal.      Mouth/Throat:      Pharynx: Oropharynx is clear.   Eyes:      Extraocular Movements: Extraocular movements intact.      Conjunctiva/sclera: Conjunctivae normal.   Cardiovascular:      Rate and Rhythm: Normal rate and regular rhythm.      Pulses: Normal pulses.      Heart sounds: Normal heart sounds.   Pulmonary:      Effort: Pulmonary effort is normal.      Breath sounds: Normal breath sounds.   Abdominal:      General: Abdomen is flat. Bowel sounds are normal.      Palpations: Abdomen is soft.   Musculoskeletal:         General: Swelling present. Normal range of motion.      Cervical back: Normal range of motion and neck supple.      Comments: L ankle edema     RUE in splint and sling    Skin:     General: Skin is warm and dry.   Neurological:      General: No focal deficit present.      Mental Status: He is alert and oriented to person, place, and time.       Sensory: No sensory deficit.           Significant Labs: All pertinent labs within the past 24 hours have been reviewed.    Significant Imaging: I have reviewed all pertinent imaging results/findings within the past 24 hours.      Assessment/Plan:      * DVT (deep venous thrombosis)  Extensive left lower extremity deep vein thrombosis and partially occlusive thrombus in the right femoral vein.    Symptoms of L ankle edema occurred prior to noted fx.   With pathologic fx and multiple DVTs concerned for malignancy     hypercoagulable labs   -full dose lovenox with plans for DOAC at discharge   -consult hemoc   -PT/OT  May need placement with debilitated state     Hilar mass    Hilar/RLL mass   PSA WNL   IGA elevated   Anemia   New BLE DVTs in hypercoagulable state   Multiple bone lesions   Hemoc on board, working up Multiple myeloma   -palliative care consult     Bone lesion        Lumbar pain   -xray lumbar spine -L1 mild height loss. This is likely a mets bone lesion     Impacted cerumen of right ear    -liquid colace and normal saline flush     Anemia of chronic disease  Anemia panel noted 9/2022  Hgb 10.5--> 8.3--> 7.4 over the past 5 months   Will transfuse if < 7 or symptomatic   Continue to monitor   hemoc consulted     Fracture of right humerus    In splint and sling since 10/26  Neurovascularly intact  -consult ortho     Diabetes mellitus, type II  Patient's FSGs are controlled on current medication regimen.  Last A1c reviewed-   Lab Results   Component Value Date    HGBA1C 6.1 (H) 09/08/2022     Most recent fingerstick glucose reviewed-   Recent Labs   Lab 10/31/22  1700 10/31/22  2020 11/01/22  0510 11/01/22  1211   POCTGLUCOSE 150* 127* 130* 187*     Current correctional scale  Low    Hold Oral hypoglycemics while patient is in the hospital.    Hypertension    Patient has a current diagnosis of HTN which is uncontrolled.  Latest blood pressure and vitals reviewed-   Temp:  [97.5 °F (36.4 °C)-99.1 °F (37.3  °C)]   Pulse:  []   Resp:  [13-20]   BP: (112-177)/(56-74)   SpO2:  [92 %-100 %] .   Patient currently off IV antihypertensives.   Home meds for hypertension were reviewed and noted below.   Hypertension Medications             losartan (COZAAR) 100 MG tablet TAKE 1 TABLET BY MOUTH EVERY DAY          Medication adjustment for hospital antihypertensives is as follows- resume losartan     Will goal for controlled BP reduction as noted be medications noted above and monitor and mitigate end organ damage as indicated.        Hyperlipidemia    Lab Results   Component Value Date    LDLCALC 63.4 06/08/2022     Resume statin       VTE Risk Mitigation (From admission, onward)         Ordered     enoxaparin injection 80 mg  Every 12 hours         10/31/22 8057                Discharge Planning   ALEJANDRO:      Code Status: Full Code   Is the patient medically ready for discharge?:     Reason for patient still in hospital (select all that apply): Patient unstable and Patient trending condition  Discharge Plan A: Home with family                  Kesha Higgins NP  Department of Hospital Medicine   [facility]

## 2022-11-01 NOTE — SUBJECTIVE & OBJECTIVE
Interval hx: he denies new complaints today. He states he feels ok.   Noted R hilar, RLL mass   PSA WNL   IGA elevated   Multiple bone lesions   Hemoc working up MM   Ortho consult   Palliative care consult.     Review of Systems   Constitutional:  Positive for activity change, appetite change, fatigue and unexpected weight change. Negative for chills, diaphoresis and fever.   HENT:  Negative for trouble swallowing and voice change.    Eyes:  Negative for photophobia and visual disturbance.   Respiratory:  Negative for cough, choking, chest tightness, shortness of breath, wheezing and stridor.    Cardiovascular:  Positive for leg swelling. Negative for chest pain and palpitations.   Gastrointestinal:  Negative for abdominal pain, constipation, diarrhea, nausea and vomiting.   Genitourinary:  Negative for difficulty urinating, dysuria, enuresis, flank pain and hematuria.   Musculoskeletal:  Positive for arthralgias and back pain. Negative for myalgias.   Skin:  Negative for rash and wound.   Neurological:  Negative for dizziness, tremors, syncope, facial asymmetry, weakness, light-headedness, numbness and headaches.   Hematological:  Negative for adenopathy. Does not bruise/bleed easily.   Objective:     Vital Signs (Most Recent):  Temp: 97.6 °F (36.4 °C) (11/01/22 1213)  Pulse: 89 (11/01/22 1213)  Resp: 16 (11/01/22 1213)  BP: (!) 112/56 (11/01/22 1213)  SpO2: 97 % (11/01/22 1213)   Vital Signs (24h Range):  Temp:  [97.5 °F (36.4 °C)-99.1 °F (37.3 °C)] 97.6 °F (36.4 °C)  Pulse:  [] 89  Resp:  [13-20] 16  SpO2:  [92 %-100 %] 97 %  BP: (112-177)/(56-74) 112/56     Weight: 77.5 kg (170 lb 13.7 oz)  Body mass index is 21.94 kg/m².    Physical Exam  Constitutional:       Appearance: Normal appearance.      Comments: Appears thin    HENT:      Nose: Nose normal.      Mouth/Throat:      Pharynx: Oropharynx is clear.   Eyes:      Extraocular Movements: Extraocular movements intact.      Conjunctiva/sclera:  Conjunctivae normal.   Cardiovascular:      Rate and Rhythm: Normal rate and regular rhythm.      Pulses: Normal pulses.      Heart sounds: Normal heart sounds.   Pulmonary:      Effort: Pulmonary effort is normal.      Breath sounds: Normal breath sounds.   Abdominal:      General: Abdomen is flat. Bowel sounds are normal.      Palpations: Abdomen is soft.   Musculoskeletal:         General: Swelling present. Normal range of motion.      Cervical back: Normal range of motion and neck supple.      Comments: L ankle edema     RUE in splint and sling    Skin:     General: Skin is warm and dry.   Neurological:      General: No focal deficit present.      Mental Status: He is alert and oriented to person, place, and time.      Sensory: No sensory deficit.           Significant Labs: All pertinent labs within the past 24 hours have been reviewed.    Significant Imaging: I have reviewed all pertinent imaging results/findings within the past 24 hours.

## 2022-11-01 NOTE — CONSULTS
Consult Note  Pulmonary & Critical Care Medicine    Attending: Shital Lindsay  Fellow: Ariella Chapa  Admit Date: 10/31/2022  Today's Date: 11/01/2022  Reason for Consult:  Mediastinal Mass and left pleural effusion    SUBJECTIVE:     HPI: Mr. Castellanos is a 78yo CM with a PMH of pathologic right humerus fx, DMT2 and HLD who presented to his PCP with complaints of LLE swelling x1 week. States he noticed the swelling after he became more sedentary after his humerus fracture. BLE US revealed bilateral DVTs. He was subsequently sent to the ED for evaluation. Of note, he also endorses an ~25lbs weight loss in the past 6 months and lumbar pain. Currently being worked up outpt for malignancy. CT chest revealed a mediastinal and RLL mass with a right pleural effusion. Pulm consulted for evaluation.    Review of patient's allergies indicates:   Allergen Reactions    Asa [aspirin]      Patient states he doesn't have allergy to medication       Past Medical History:   Diagnosis Date    Arthritis     Diabetes mellitus, type II     fasting bg criteria    Hyperlipidemia      Past Surgical History:   Procedure Laterality Date    TONSILLECTOMY       Family History   Problem Relation Age of Onset    Cancer Mother         unknown, met to spine    Hypertension Mother     Osteoarthritis Mother     Deep vein thrombosis Father     Heart attack Father     Pulmonary embolism Father     Emphysema Father     Arthritis Sister     No Known Problems Daughter     No Known Problems Son     Fibromyalgia Sister     Breast cancer Maternal Aunt     Heart disease Neg Hx     Prostate cancer Neg Hx     Colon cancer Neg Hx     Diabetes Neg Hx      Social History     Tobacco Use    Smoking status: Never    Smokeless tobacco: Never   Substance Use Topics    Alcohol use: Yes     Comment: Occasionally    Drug use: No       All medications reviewed.    Review of Systems   Constitutional:  Positive for weight loss. Negative for chills, fever and  malaise/fatigue.   HENT:  Negative for congestion, nosebleeds, sinus pain and sore throat.    Eyes:  Negative for blurred vision and double vision.   Respiratory:  Positive for sputum production. Negative for cough, hemoptysis, shortness of breath and wheezing.    Cardiovascular:  Positive for leg swelling. Negative for chest pain and palpitations.   Gastrointestinal:  Negative for abdominal pain, constipation, diarrhea, nausea and vomiting.   Genitourinary:  Negative for frequency and urgency.   Musculoskeletal:  Positive for back pain and falls. Negative for joint pain, myalgias and neck pain.   Skin:  Negative for itching and rash.   Neurological:  Negative for dizziness, weakness and headaches.   All other systems reviewed and are negative.    OBJECTIVE:     Vital Signs Trends/Hx Reviewed  Vitals:    11/01/22 0434 11/01/22 0755 11/01/22 1213 11/01/22 1547   BP: 134/61 (!) 143/65 (!) 112/56 (!) 101/49   Patient Position: Lying      Pulse: 94 83 89 89   Resp: 18 16 16 20   Temp: 97.5 °F (36.4 °C) 98.3 °F (36.8 °C) 97.6 °F (36.4 °C) 98.7 °F (37.1 °C)   TempSrc: Oral      SpO2: (!) 92% 95% 97% 96%   Weight:       Height:           Physical Exam:  General: NAD, cooperative & interactive.  HEENT: AT/NC, PERRL, EOMI, oral and nasal mucosa moist.   Neck: Supple without JVD or palpable LAD.   Cardiac: normal rate, regular rhythm, with no MRG with brisk cap refill and symmetric pulses in distal extremities.  Respiratory: Normal inspection. Symmetric chest rise. Normal palpation and percussion. Auscultation clear bilaterally. No increased work of breathing noted.   Abdomen: Soft, NT/ND. +BS. No hepatosplenomegaly.   Extremities: RUE in sling. LLE edema resolved.  Neuro: Grossly intact to brief exam. Oriented x3 with appropriate mood/affect to situation.       Laboratory:  No results for input(s): PH, PCO2, PO2, HCO3, POCSATURATED, BE in the last 24 hours.  Recent Labs   Lab 11/01/22  0505   WBC 12.16   RBC 2.24*   HGB 7.4*    HCT 22.1*   PLT 90*   MCV 99*   MCH 33.0*   MCHC 33.5     Recent Labs   Lab 11/01/22  0505   *   K 4.3   CL 97   CO2 24   BUN 14   CREATININE 0.7   MG 2.0       Microbiology Data:   Microbiology Results (last 7 days)       ** No results found for the last 168 hours. **             Chest Imaging:   CT Chest-  mediastinal and RLL mass with a right pleural effusion. Hilar LAD.      Scheduled Medications:    atorvastatin  80 mg Oral QHS    losartan  100 mg Oral Daily       PRN Medications:   acetaminophen, dextrose 10%, dextrose 10%, glucagon (human recombinant), glucose, glucose, HYDROcodone-acetaminophen, insulin aspart U-100, naloxone, ondansetron, sodium chloride 0.9%    Assessment & Plan:   Patient Active Problem List   Diagnosis    Arthritis    Hearing loss of left ear    Hyperlipidemia    ED (erectile dysfunction)    Hypertension    IFG (impaired fasting glucose)    Diabetes mellitus, type II    DVT (deep venous thrombosis)    Fracture of right humerus    Anemia of chronic disease    Impacted cerumen of right ear    Lumbar pain    Bone lesion    Mass of right lung    Palliative care encounter       ASSESSMENT & RECOMMENDATIONS     #Right Pleural Effusion  -Bedside US revealed a 3.5-4cm pocket of pleural fluid for possible thoracentesis.  -Spoke to patient and wife at bedside regarding procedure and they are agreeable to it  -Therapeutic lovenox held with last dose being this morning .   -Will attempt bedside thoracentesis tomorrow.     #Mediastinal Mass  #RLL Mass  #Hilar LAD  -Will need EBUS for further evaluation  -Will reach out to [facility] proceduralists Kimberly Toure and Krysta to evaluate for their schedule.    Thank you for allowing us to participate in the care of this patient. We will continue to follow. Please call with questions.    Ariella Chapa M.D., PGY-V  [employer] Pulmonary/Critical Care Fellow

## 2022-11-01 NOTE — PLAN OF CARE
Problem: Adult Inpatient Plan of Care  Goal: Plan of Care Review  Outcome: Ongoing, Progressing   VIRTUAL NURSE:  Cued into patient's room.  Permission received per patient to turn camera to view patient.  Introduced as VN for night shift that will be working with floor nurse and nursing assistant.  Educated patient on VN's role in patient care and  VIP model.  Plan of care reviewed with patient.  Education per flowsheet.   Informed patient that staff will round on them every 2 hours but to use call light for any other needs they may have; informed of fall risk and fall precautions.  Patient verbalized understanding.  Call light within reach; bed siderails up x3.  Opportunity given for questions and questions answered.  Admission assessment questions answered.  Patient denies complaints or any needs at this time. Instructed to call for assistance.  Will cont to monitor and intervene as needed.    Labs, notes, orders, and careplan reviewed.

## 2022-11-01 NOTE — PLAN OF CARE
went to meet with patient. Patient reports he is independent and lives at home with his spouse. He has a walker and borrowed wheelchair at home. Patient was driving prior to hospital stay. Patient has family that can transport home at discharge. He does not have Home Health. Patient has a Right humerus fracture with arm in sling. Therapy to work with patient. Ortho also contacted. Patient has a previous Heme/Onc appointment scheduled.  will follow-up on recommendations to see if needs to be rescheduled. PCC appointment scheduled (prior to PCP request). Patient encouraged to call with any questions or concerns.  will continue to follow patient through transitions of care and assist with any discharge needs.     1224-- contacted Brace line [phone]. I spoke with Consuelo and notified her of order placed. She told me likely will not be able to deliver until after 5 pm today.      Inpatient consult to Social Work  Once     Complete     Comments: Patient needs R humerus Lees Brace with proximal extension for a pathologic humerus fracture        1528-- went to meet with patient. Wife at bedside. Patient confirmed brace was delivered.  sent Home Health referral to [facility].     Ortho appointment requested.     3901-- went to meet with patient and spouse at bedside. Spouse agreeable to Home Health ([place]). I also sent message to Dr. Wilson in appointment needs to be rescheduled. Patient's brace was delivered.    [facility]. Will need Home Health orders. Ortho follow-up requested and Dr. Wilson asked for appointment to rescheduled (heme/onc) for week of Nov 14. Appointments requested.    Patient Contacts    Name Relation Home Work Mobile   Cally Castellanos Spouse [phone]     Future Appointments   Date Time Provider Department Center   11/4/2022  9:00  AM Sherri Jane NP [facility] HEM ONC [facility]   11/15/2022  1:30 PM Brittanie Gonzalez MD [facility]  Home Health Services, Home Therapy Services [phone] [address] [facility] [address]      Next Steps: Follow up  Appointment:   Instructions: [facility]        11/01/22 1530   Post-Acute Status   Post-Acute Authorization Home Health   Home Health Status Referrals Sent        11/01/22 1016   Discharge Assessment   Assessment Type Discharge Planning Assessment   Confirmed/corrected address, phone number and insurance Yes   Confirmed Demographics Correct on Facesheet   Source of Information patient   Lives With significant other;spouse   Do you expect to return to your current living situation? Yes   Do you have help at home or someone to help you manage your care at home? Yes   Who are your caregiver(s) and their phone number(s)? Cally (Spouse) Phone#[phone]   Prior to hospitilization cognitive status: Alert/Oriented   Current cognitive status: Alert/Oriented   Walking or Climbing Stairs Difficulty ambulation difficulty, requires equipment   Dressing/Bathing Difficulty none   Equipment Currently Used at Home walker, rolling;other (see comments)  (Borrowed wheelchair)   Do you take prescription medications? Yes   Do you have prescription coverage? Yes   Do you have any problems affording any of your prescribed medications? No   Is the patient taking medications as prescribed? yes   Who is going to help you get home at discharge? Cally (Spouse) Phone#[phone]   How do you get to doctors appointments? car, drives self;family or friend will provide   Are you on dialysis? No   Do you take coumadin? No   Discharge Plan A Home with family   Discharge Plan B Home with family;Home Health   DME Needed Upon Discharge  none   Discharge Plan discussed with: Patient   Discharge Barriers Identified None   Physical Activity   On  average, how many days per week do you engage in moderate to strenuous exercise (like a brisk walk)? Patient refu   On average, how many minutes do you engage in exercise at this level? Patient refu   Financial Resource Strain   How hard is it for you to pay for the very basics like food, housing, medical care, and heating? Not hard   Housing Stability   In the last 12 months, was there a time when you were not able to pay the mortgage or rent on time? N   In the last 12 months, was there a time when you did not have a steady place to sleep or slept in a shelter (including now)? N   Transportation Needs   In the past 12 months, has lack of transportation kept you from medical appointments or from getting medications? no   In the past 12 months, has lack of transportation kept you from meetings, work, or from getting things needed for daily living? No   Food Insecurity   Within the past 12 months, you worried that your food would run out before you got the money to buy more. Never true   Within the past 12 months, the food you bought just didn't last and you didn't have money to get more. Never true   Stress   Do you feel stress - tense, restless, nervous, or anxious, or unable to sleep at night because your mind is troubled all the time - these days? Patient refu   Social Connections   In a typical week, how many times do you talk on the phone with family, friends, or neighbors? More than 3   How often do you get together with friends or relatives? More than 3   How often do you attend [employer]ian or Jehovah's witness services? Patient refu   Do you belong to any clubs or organizations such as [employer] groups, unions, fraternal or athletic groups, or school groups? Patient refu   How often do you attend meetings of the clubs or organizations you belong to? Patient refu   Are you , , , , never , or living with a partner?    Alcohol Use   Q1: How often do you have a drink containing  alcohol? Patient refu   Q2: How many drinks containing alcohol do you have on a typical day when you are drinking? Patient refu   Q3: How often do you have six or more drinks on one occasion? Patient refu     Rachel Vanegas RN    [phone]

## 2022-11-01 NOTE — PT/OT/SLP EVAL
Occupational Therapy   Evaluation    Name: Joi Castellanos  MRN: [id number]  Admitting Diagnosis:  DVT (deep venous thrombosis)  Recent Surgery: * No surgery found *      Recommendations:     Discharge Recommendations: other (see comments) (TBD; likely HH OT/PT with 24/7 care/assistance)  Discharge Equipment Recommendations:  other (see comments) (TBD)  Barriers to discharge:  Other (Comment) (Pt requries increased level of assistance)    Assessment:     Joi Castellanos is a 77 y.o. male with a medical diagnosis of DVT (deep venous thrombosis).  He presents with The primary encounter diagnosis was Acute deep vein thrombosis (DVT) of proximal vein of left lower extremity. 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 Performance deficits affecting function: weakness, impaired functional mobility, decreased coordination, gait instability, impaired endurance, impaired balance, decreased upper extremity function, impaired self care skills, orthopedic precautions, decreased ROM, impaired fine motor, impaired coordination, impaired joint extensibility, edema, decreased lower extremity function.      Pt would benefit from cont OT services in order to maximize functional independence. Recommending TBD, likely HH OT/PT with 24/7 care/assistance. Pt with RUE in brace throughout session. Pt performing functional mobility in room with CGA/Bravo & no AD. Will progress as able.     Rehab Prognosis: Good; patient would benefit from acute skilled OT services to address these deficits and reach maximum level of function.       Plan:     Patient to be seen 3 x/week to address the above listed problems via self-care/home management, therapeutic  activities, therapeutic exercises  Plan of Care Expires: 12/01/22  Plan of Care Reviewed with: patient    Subjective     Chief Complaint: Weakness  Patient/Family Comments/goals: Return to PLOF    Occupational Profile:  Patient lives with spouse (does not work) in a 1 SH, threshold to enter home, WIS with built in seat in bathroom.    Prior to admission, patients level of function was prior to UE fracture, patient was independent with mobility, required assist from wife for lower body dressing since hip surgery. + Driving.   Equipment used at home: walker, rolling, cane, straight, shower chair, bedside commode. DME owned (not currently used): rolling walker.    Upon discharge, patient will have assistance from spouse.    Pain/Comfort:  Pain Rating 1: 0/10    Patients cultural, spiritual, Hindu conflicts given the current situation: no    Objective:     Communicated with: akbar prior to session.  Patient found HOB elevated with bed alarm, peripheral IV upon OT entry to room.    General Precautions: Standard, fall   Orthopedic Precautions:RUE non weight bearing   Braces: UE Sling  Respiratory Status: Room air    Occupational Performance:    Bed Mobility:    Patient completed Supine to Sit with moderate assistance    Functional Mobility/Transfers:  Patient completed Sit <> Stand Transfer with contact guard assistance  with  no assistive device   Functional Mobility: Pt performing functional mobility in room with CGA/Slava & no AD; pt with no overt LOB however unsteady gait at times, lateral sway noted.     Activities of Daily Living:  Upper Body Dressing: minimum assistance to carlos gown 2/2 RUE in sling    Cognitive/Visual Perceptual:  Cognitive/Psychosocial Skills:     -       Oriented to: Person, Place, Time, and Situation   -       Follows Commands/attention:Follows multistep  commands  -       Memory: No Deficits noted  -       Mood/Affect/Coping skills/emotional control: Cooperative and Pleasant    Physical  Exam:  Sensation:    -       Intact  light/touch BUE  Upper Extremity Range of Motion:     -       Right Upper Extremity: immobilized in sling  -       Left Upper Extremity: Deficits: decreased shoulder flex ~145 degrees  Upper Extremity Strength:    -       Left Upper Extremity: 3+ to 4/5   Strength:    -       Right Upper Extremity: Fair-  -       Left Upper Extremity: Good-    AMPAC 6 Click ADL:  AMPAC Total Score: 16    Treatment & Education:  Pt would benefit from cont OT services in order to maximize functional independence.    Pt with RUE in brace throughout session.   Pt requires increased assistance for sup>sit 2/2 RUE in sling.   Pt performing functional mobility in room with CGA/Bravo & no AD.   Pt educated on RUE wrist & finger AROM as able.   Will progress as able.     Patient left up in chair with all lines intact, call button in reach, chair alarm on, and nsg notified    GOALS:   Multidisciplinary Problems       Occupational Therapy Goals          Problem: Occupational Therapy    Goal Priority Disciplines Outcome Interventions   Occupational Therapy Goal     OT, PT/OT Ongoing, Progressing    Description: Goals to be met by: 21/1/2022     Patient will increase functional independence with ADLs by performing:    LE Dressing with Minimal Assistance.  Toileting from toilet with Stand-by Assistance for hygiene and clothing management.   Supine to sit with Modified Nolan.  Step transfer with Supervision & appropriate AD.  Toilet transfer to toilet with Supervision & appropriate AD.                         History:     Past Medical History:   Diagnosis Date    Arthritis     Diabetes mellitus, type II     fasting bg criteria    Hyperlipidemia          Past Surgical History:   Procedure Laterality Date    TONSILLECTOMY         Time Tracking:     OT Date of Treatment: 11/01/22  OT Start Time: 0935  OT Stop Time: 0958  OT Total Time (min): 23 min with PT    Billable Minutes:Evaluation 10  Therapeutic  Activity 13    11/1/2022

## 2022-11-02 ENCOUNTER — TELEPHONE (OUTPATIENT)
Dept: HEMATOLOGY/ONCOLOGY | Facility: CLINIC | Age: 77
End: 2022-11-02
Payer: MEDICARE

## 2022-11-02 ENCOUNTER — NURSE TRIAGE (OUTPATIENT)
Dept: ADMINISTRATIVE | Facility: CLINIC | Age: 77
End: 2022-11-02
Payer: MEDICARE

## 2022-11-02 VITALS
WEIGHT: 170 LBS | HEIGHT: 74 IN | OXYGEN SATURATION: 93 % | RESPIRATION RATE: 18 BRPM | BODY MASS INDEX: 21.82 KG/M2 | TEMPERATURE: 97 F | HEART RATE: 88 BPM | DIASTOLIC BLOOD PRESSURE: 62 MMHG | SYSTOLIC BLOOD PRESSURE: 133 MMHG

## 2022-11-02 PROBLEM — R91.8 HILAR MASS: Status: ACTIVE | Noted: 2022-11-02

## 2022-11-02 PROBLEM — J90 PLEURAL EFFUSION ON RIGHT: Status: ACTIVE | Noted: 2022-11-02

## 2022-11-02 PROBLEM — F41.9 ANXIETY: Status: ACTIVE | Noted: 2022-11-02

## 2022-11-02 PROBLEM — C79.51 BONE METASTASES: Status: ACTIVE | Noted: 2022-11-02

## 2022-11-02 LAB
ALBUMIN FLD-MCNC: 2.5 G/DL
ALBUMIN SERPL BCP-MCNC: 2.6 G/DL (ref 3.5–5.2)
ALP SERPL-CCNC: 411 U/L (ref 55–135)
ALT SERPL W/O P-5'-P-CCNC: 29 U/L (ref 10–44)
AMYLASE, BODY FLUID: 35 U/L
ANION GAP SERPL CALC-SCNC: 14 MMOL/L (ref 8–16)
ANISOCYTOSIS BLD QL SMEAR: SLIGHT
APPEARANCE FLD: NORMAL
AST SERPL-CCNC: 29 U/L (ref 10–40)
BASOPHILS # BLD AUTO: ABNORMAL K/UL (ref 0–0.2)
BASOPHILS NFR BLD: 0 % (ref 0–1.9)
BILIRUB SERPL-MCNC: 0.6 MG/DL (ref 0.1–1)
BODY FLD TYPE: NORMAL
BODY FLUID SOURCE AMYLASE: NORMAL
BODY FLUID SOURCE, LDH: NORMAL
BUN SERPL-MCNC: 16 MG/DL (ref 8–23)
CALCIUM SERPL-MCNC: 8.9 MG/DL (ref 8.7–10.5)
CHLORIDE SERPL-SCNC: 98 MMOL/L (ref 95–110)
CO2 SERPL-SCNC: 21 MMOL/L (ref 23–29)
COLOR FLD: YELLOW
CREAT SERPL-MCNC: 0.7 MG/DL (ref 0.5–1.4)
DIFFERENTIAL METHOD: ABNORMAL
EOSINOPHIL # BLD AUTO: ABNORMAL K/UL (ref 0–0.5)
EOSINOPHIL NFR BLD: 1 % (ref 0–8)
EOSINOPHIL NFR FLD MANUAL: 7 %
ERYTHROCYTE [DISTWIDTH] IN BLOOD BY AUTOMATED COUNT: 16.7 % (ref 11.5–14.5)
EST. GFR  (NO RACE VARIABLE): >60 ML/MIN/1.73 M^2
GIANT PLATELETS BLD QL SMEAR: PRESENT
GLUCOSE FLD-MCNC: 153 MG/DL
GLUCOSE SERPL-MCNC: 141 MG/DL (ref 70–110)
GRAM STN SPEC: NORMAL
GRAM STN SPEC: NORMAL
HCT VFR BLD AUTO: 22.9 % (ref 40–54)
HGB BLD-MCNC: 7.5 G/DL (ref 14–18)
IMM GRANULOCYTES # BLD AUTO: ABNORMAL K/UL (ref 0–0.04)
IMM GRANULOCYTES NFR BLD AUTO: ABNORMAL % (ref 0–0.5)
LDH FLD L TO P-CCNC: 224 U/L
LYMPHOCYTES # BLD AUTO: ABNORMAL K/UL (ref 1–4.8)
LYMPHOCYTES NFR BLD: 6 % (ref 18–48)
LYMPHOCYTES NFR FLD MANUAL: 57 %
MAGNESIUM SERPL-MCNC: 2.1 MG/DL (ref 1.6–2.6)
MCH RBC QN AUTO: 32.9 PG (ref 27–31)
MCHC RBC AUTO-ENTMCNC: 32.8 G/DL (ref 32–36)
MCV RBC AUTO: 100 FL (ref 82–98)
METAMYELOCYTES NFR BLD MANUAL: 1 %
MONOCYTES # BLD AUTO: ABNORMAL K/UL (ref 0.3–1)
MONOCYTES NFR BLD: 2 % (ref 4–15)
MONOS+MACROS NFR FLD MANUAL: 30 %
MYELOCYTES NFR BLD MANUAL: 7 %
NEUTROPHILS NFR BLD: 79 % (ref 38–73)
NEUTROPHILS NFR FLD MANUAL: 6 %
NEUTS BAND NFR BLD MANUAL: 4 %
NRBC BLD-RTO: 0 /100 WBC
OVALOCYTES BLD QL SMEAR: ABNORMAL
PHOSPHATE SERPL-MCNC: 3.6 MG/DL (ref 2.7–4.5)
PLATELET # BLD AUTO: 92 K/UL (ref 150–450)
PLATELET BLD QL SMEAR: ABNORMAL
PMV BLD AUTO: 9.6 FL (ref 9.2–12.9)
POCT GLUCOSE: 142 MG/DL (ref 70–110)
POIKILOCYTOSIS BLD QL SMEAR: SLIGHT
POTASSIUM SERPL-SCNC: 4.1 MMOL/L (ref 3.5–5.1)
PROT FLD-MCNC: 4.5 G/DL
PROT SERPL-MCNC: 7 G/DL (ref 6–8.4)
RBC # BLD AUTO: 2.28 M/UL (ref 4.6–6.2)
SODIUM SERPL-SCNC: 133 MMOL/L (ref 136–145)
SPECIMEN SOURCE: NORMAL
WBC # BLD AUTO: 12.49 K/UL (ref 3.9–12.7)
WBC # FLD: 555 /CU MM

## 2022-11-02 PROCEDURE — 88307 TISSUE EXAM BY PATHOLOGIST: CPT | Mod: 26,,, | Performed by: PATHOLOGY

## 2022-11-02 PROCEDURE — 88341 IMHCHEM/IMCYTCHM EA ADD ANTB: CPT | Mod: 26,,, | Performed by: PATHOLOGY

## 2022-11-02 PROCEDURE — 88112 PR  CYTOPATH, CELL ENHANCE TECH: ICD-10-PCS | Mod: 26,,, | Performed by: PATHOLOGY

## 2022-11-02 PROCEDURE — 36415 COLL VENOUS BLD VENIPUNCTURE: CPT | Performed by: NURSE PRACTITIONER

## 2022-11-02 PROCEDURE — 25000003 PHARM REV CODE 250: Performed by: STUDENT IN AN ORGANIZED HEALTH CARE EDUCATION/TRAINING PROGRAM

## 2022-11-02 PROCEDURE — 88112 CYTOPATH CELL ENHANCE TECH: CPT | Performed by: PATHOLOGY

## 2022-11-02 PROCEDURE — 84311 SPECTROPHOTOMETRY: CPT | Performed by: STUDENT IN AN ORGANIZED HEALTH CARE EDUCATION/TRAINING PROGRAM

## 2022-11-02 PROCEDURE — 89051 BODY FLUID CELL COUNT: CPT | Performed by: STUDENT IN AN ORGANIZED HEALTH CARE EDUCATION/TRAINING PROGRAM

## 2022-11-02 PROCEDURE — G0378 HOSPITAL OBSERVATION PER HR: HCPCS

## 2022-11-02 PROCEDURE — 82945 GLUCOSE OTHER FLUID: CPT | Performed by: STUDENT IN AN ORGANIZED HEALTH CARE EDUCATION/TRAINING PROGRAM

## 2022-11-02 PROCEDURE — 85027 COMPLETE CBC AUTOMATED: CPT | Performed by: NURSE PRACTITIONER

## 2022-11-02 PROCEDURE — 25000003 PHARM REV CODE 250: Performed by: RADIOLOGY

## 2022-11-02 PROCEDURE — 82150 ASSAY OF AMYLASE: CPT | Performed by: STUDENT IN AN ORGANIZED HEALTH CARE EDUCATION/TRAINING PROGRAM

## 2022-11-02 PROCEDURE — 88312 SPECIAL STAINS GROUP 1: CPT | Performed by: PATHOLOGY

## 2022-11-02 PROCEDURE — 83735 ASSAY OF MAGNESIUM: CPT | Performed by: NURSE PRACTITIONER

## 2022-11-02 PROCEDURE — 88341 IMHCHEM/IMCYTCHM EA ADD ANTB: CPT | Mod: 59 | Performed by: PATHOLOGY

## 2022-11-02 PROCEDURE — 87102 FUNGUS ISOLATION CULTURE: CPT | Performed by: STUDENT IN AN ORGANIZED HEALTH CARE EDUCATION/TRAINING PROGRAM

## 2022-11-02 PROCEDURE — 88342 CHG IMMUNOCYTOCHEMISTRY: ICD-10-PCS | Mod: 26,,, | Performed by: PATHOLOGY

## 2022-11-02 PROCEDURE — 88342 IMHCHEM/IMCYTCHM 1ST ANTB: CPT | Mod: 59 | Performed by: PATHOLOGY

## 2022-11-02 PROCEDURE — 88307 PR  SURG PATH,LEVEL V: ICD-10-PCS | Mod: 26,,, | Performed by: PATHOLOGY

## 2022-11-02 PROCEDURE — 87206 SMEAR FLUORESCENT/ACID STAI: CPT | Performed by: STUDENT IN AN ORGANIZED HEALTH CARE EDUCATION/TRAINING PROGRAM

## 2022-11-02 PROCEDURE — 63600175 PHARM REV CODE 636 W HCPCS: Performed by: RADIOLOGY

## 2022-11-02 PROCEDURE — 83615 LACTATE (LD) (LDH) ENZYME: CPT | Performed by: STUDENT IN AN ORGANIZED HEALTH CARE EDUCATION/TRAINING PROGRAM

## 2022-11-02 PROCEDURE — 87116 MYCOBACTERIA CULTURE: CPT | Performed by: STUDENT IN AN ORGANIZED HEALTH CARE EDUCATION/TRAINING PROGRAM

## 2022-11-02 PROCEDURE — 87205 SMEAR GRAM STAIN: CPT | Performed by: STUDENT IN AN ORGANIZED HEALTH CARE EDUCATION/TRAINING PROGRAM

## 2022-11-02 PROCEDURE — 87070 CULTURE OTHR SPECIMN AEROBIC: CPT | Performed by: STUDENT IN AN ORGANIZED HEALTH CARE EDUCATION/TRAINING PROGRAM

## 2022-11-02 PROCEDURE — 88342 IMHCHEM/IMCYTCHM 1ST ANTB: CPT | Mod: 26,,, | Performed by: PATHOLOGY

## 2022-11-02 PROCEDURE — 88305 TISSUE EXAM BY PATHOLOGIST: ICD-10-PCS | Mod: 26,,, | Performed by: PATHOLOGY

## 2022-11-02 PROCEDURE — 88311 DECALCIFY TISSUE: CPT | Mod: 26,,, | Performed by: PATHOLOGY

## 2022-11-02 PROCEDURE — 84157 ASSAY OF PROTEIN OTHER: CPT | Performed by: STUDENT IN AN ORGANIZED HEALTH CARE EDUCATION/TRAINING PROGRAM

## 2022-11-02 PROCEDURE — 88307 TISSUE EXAM BY PATHOLOGIST: CPT | Performed by: PATHOLOGY

## 2022-11-02 PROCEDURE — 88342 IMHCHEM/IMCYTCHM 1ST ANTB: CPT | Performed by: PATHOLOGY

## 2022-11-02 PROCEDURE — 88184 FLOWCYTOMETRY/ TC 1 MARKER: CPT | Performed by: PATHOLOGY

## 2022-11-02 PROCEDURE — 88341 PR IHC OR ICC EACH ADD'L SINGLE ANTIBODY  STAINPR: ICD-10-PCS | Mod: 26,,, | Performed by: PATHOLOGY

## 2022-11-02 PROCEDURE — 88305 TISSUE EXAM BY PATHOLOGIST: CPT | Performed by: PATHOLOGY

## 2022-11-02 PROCEDURE — 88312 SPECIAL STAINS GROUP 1: CPT | Mod: 26,,, | Performed by: PATHOLOGY

## 2022-11-02 PROCEDURE — 88189 PR  FLOWCYTOMETRY/READ, 16 & > MARKERS: ICD-10-PCS | Mod: ,,, | Performed by: PATHOLOGY

## 2022-11-02 PROCEDURE — 88185 FLOWCYTOMETRY/TC ADD-ON: CPT | Mod: 59 | Performed by: PATHOLOGY

## 2022-11-02 PROCEDURE — 88305 TISSUE EXAM BY PATHOLOGIST: CPT | Mod: 26,,, | Performed by: PATHOLOGY

## 2022-11-02 PROCEDURE — 80053 COMPREHEN METABOLIC PANEL: CPT | Performed by: NURSE PRACTITIONER

## 2022-11-02 PROCEDURE — 88311 DECALCIFY TISSUE: CPT | Performed by: PATHOLOGY

## 2022-11-02 PROCEDURE — 88189 FLOWCYTOMETRY/READ 16 & >: CPT | Mod: ,,, | Performed by: PATHOLOGY

## 2022-11-02 PROCEDURE — 84100 ASSAY OF PHOSPHORUS: CPT | Performed by: NURSE PRACTITIONER

## 2022-11-02 PROCEDURE — 85007 BL SMEAR W/DIFF WBC COUNT: CPT | Performed by: NURSE PRACTITIONER

## 2022-11-02 PROCEDURE — 25000003 PHARM REV CODE 250: Performed by: NURSE PRACTITIONER

## 2022-11-02 PROCEDURE — 88112 CYTOPATH CELL ENHANCE TECH: CPT | Mod: 26,,, | Performed by: PATHOLOGY

## 2022-11-02 PROCEDURE — 82042 OTHER SOURCE ALBUMIN QUAN EA: CPT | Performed by: STUDENT IN AN ORGANIZED HEALTH CARE EDUCATION/TRAINING PROGRAM

## 2022-11-02 PROCEDURE — 88312 PR  SPECIAL STAINS,GROUP I: ICD-10-PCS | Mod: 26,,, | Performed by: PATHOLOGY

## 2022-11-02 PROCEDURE — 88311 PR  DECALCIFY TISSUE: ICD-10-PCS | Mod: 26,,, | Performed by: PATHOLOGY

## 2022-11-02 RX ORDER — LIDOCAINE HYDROCHLORIDE 10 MG/ML
INJECTION INFILTRATION; PERINEURAL
Status: COMPLETED | OUTPATIENT
Start: 2022-11-02 | End: 2022-11-02

## 2022-11-02 RX ORDER — LIDOCAINE HYDROCHLORIDE 10 MG/ML
10 INJECTION, SOLUTION EPIDURAL; INFILTRATION; INTRACAUDAL; PERINEURAL ONCE
Status: COMPLETED | OUTPATIENT
Start: 2022-11-02 | End: 2022-11-02

## 2022-11-02 RX ORDER — HYDROXYZINE PAMOATE 25 MG/1
25 CAPSULE ORAL EVERY 8 HOURS PRN
Qty: 21 CAPSULE | Refills: 0 | Status: SHIPPED | OUTPATIENT
Start: 2022-11-02

## 2022-11-02 RX ORDER — HYDROXYZINE PAMOATE 25 MG/1
25 CAPSULE ORAL EVERY 8 HOURS PRN
Status: DISCONTINUED | OUTPATIENT
Start: 2022-11-02 | End: 2022-11-02 | Stop reason: HOSPADM

## 2022-11-02 RX ORDER — TALC
6 POWDER (GRAM) TOPICAL NIGHTLY PRN
Status: DISCONTINUED | OUTPATIENT
Start: 2022-11-02 | End: 2022-11-02 | Stop reason: HOSPADM

## 2022-11-02 RX ORDER — FENTANYL CITRATE 50 UG/ML
INJECTION, SOLUTION INTRAMUSCULAR; INTRAVENOUS
Status: COMPLETED | OUTPATIENT
Start: 2022-11-02 | End: 2022-11-02

## 2022-11-02 RX ORDER — MIDAZOLAM HYDROCHLORIDE 1 MG/ML
INJECTION INTRAMUSCULAR; INTRAVENOUS
Status: COMPLETED | OUTPATIENT
Start: 2022-11-02 | End: 2022-11-02

## 2022-11-02 RX ORDER — TALC
6 POWDER (GRAM) TOPICAL NIGHTLY PRN
Qty: 30 TABLET | Refills: 0 | Status: SHIPPED | OUTPATIENT
Start: 2022-11-02 | End: 2022-11-15

## 2022-11-02 RX ORDER — METHOCARBAMOL 500 MG/1
1000 TABLET, FILM COATED ORAL 3 TIMES DAILY
Qty: 30 TABLET | Refills: 0 | Status: SHIPPED | OUTPATIENT
Start: 2022-11-02 | End: 2022-11-07

## 2022-11-02 RX ADMIN — FENTANYL CITRATE 50 MCG: 50 INJECTION INTRAMUSCULAR; INTRAVENOUS at 10:11

## 2022-11-02 RX ADMIN — MIDAZOLAM HYDROCHLORIDE 2 MG: 1 INJECTION INTRAMUSCULAR; INTRAVENOUS at 10:11

## 2022-11-02 RX ADMIN — LIDOCAINE HYDROCHLORIDE 100 MG: 10 INJECTION, SOLUTION EPIDURAL; INFILTRATION; INTRACAUDAL; PERINEURAL at 03:11

## 2022-11-02 RX ADMIN — LIDOCAINE HYDROCHLORIDE 10 ML: 10 INJECTION, SOLUTION INFILTRATION; PERINEURAL at 11:11

## 2022-11-02 RX ADMIN — LIDOCAINE HYDROCHLORIDE 10 ML: 10 INJECTION, SOLUTION INFILTRATION; PERINEURAL at 10:11

## 2022-11-02 RX ADMIN — LOSARTAN POTASSIUM 100 MG: 50 TABLET, FILM COATED ORAL at 08:11

## 2022-11-02 NOTE — PLAN OF CARE
AAO,meds admin as ordered,na in cunningham brace,poc reviewed,stated understanding of NPO at MN,bed alarm set.

## 2022-11-02 NOTE — PT/OT/SLP PROGRESS
Occupational Therapy  Visit Attempt    Patient Name:  Joi Castellanos   MRN:  [id number]    Patient not seen today secondary to Off the floor for procedure/surgery (9:05 - Patient KALEE in IR for bone biopsy.).     11/2/2022

## 2022-11-02 NOTE — PLAN OF CARE
CM met with pt, wife Cally at bedside   Orders sent to [facility] - pt accepted per [facility]   Pt choice form signed     No dme ordered   Future Appointments   Date Time Provider Department Center   11/14/2022  9:40 AM Rj Wilson MD [facility] HEM ONC [facility]   11/15/2022  1:30 PM Brittanie Gonzalez MD [facility]     Pt will need Pulmonary, Palliative Care, Heme Onc and Ortho    Scheduling Navigators to assist with f/u apts - pt / wife will be contacted.      Ms. Alamo was given resources for sitter services by MITCHEL Sidhu.     Family will transport pt to home         11/02/22 4164   Final Note   Assessment Type Final Discharge Note   Anticipated Discharge Disposition Home-Health  ([facility] )   What phone number can be called within the next 1-3 days to see how you are doing after discharge? [phone]   Hospital Resources/Appts/Education Provided Appointments scheduled and added to AVS;Post-Acute resouces added to AVS   Post-Acute Status   Home Health Status Set-up Complete/Auth obtained   Discharge Delays None known at this time

## 2022-11-02 NOTE — PLAN OF CARE
[facility]      HOME HEALTH ORDERS  FACE TO FACE ENCOUNTER    Patient Name: Joi Castellanos  YOB: 1945    PCP: Marc Lakhani MD   PCP Address: [address] / [place] [address]  PCP Phone Number: [phone]  PCP Fax: [phone]    Encounter Date: 10/31/22    Admit to Home Health    Diagnoses:  Active Hospital Problems    Diagnosis  POA    *DVT (deep venous thrombosis) [I82.409]  Unknown    Anxiety [F41.9]  Yes    Mass of right lung [R91.8]  Yes    Palliative care encounter [Z51.5]  Not Applicable    Fracture of right humerus [S42.301A]  Yes    Anemia of chronic disease [D63.8]  Yes    Impacted cerumen of right ear [H61.21]  Unknown    Lumbar pain [M54.50]  Unknown    Bone lesion [M89.9]  Yes    Diabetes mellitus, type II [E11.9]  Yes    Hypertension [I10]  Yes    Hyperlipidemia [E78.5]  Yes      Resolved Hospital Problems   No resolved problems to display.       Follow Up Appointments:  Future Appointments   Date Time Provider Department Center   11/14/2022  9:40 AM Rj Wilson MD [facility] HEM ONC [facility]   11/15/2022  1:30 PM Brittanie Gonzalez MD [facility] IMPRI [facility]       Allergies:  Review of patient's allergies indicates:   Allergen Reactions    Asa [aspirin]      Patient states he doesn't have allergy to medication       Medications: Review discharge medications with patient and family and provide education.    Current Facility-Administered Medications   Medication Dose Route Frequency Provider Last Rate Last Admin    acetaminophen tablet 650 mg  650 mg Oral Q8H PRN Kesha Higgins NP        atorvastatin tablet 80 mg  80 mg Oral QHS Kesha Higgins NP   80 mg at 11/01/22 2001    dextrose 10% bolus 125 mL  12.5 g Intravenous PRN Kesha Higgins NP        dextrose 10% bolus 250 mL  25 g Intravenous PRN Kesha Higgins NP        glucagon (human recombinant) injection 1 mg  1 mg Intramuscular PRN Kesha Higgins NP        glucose  chewable tablet 16 g  16 g Oral PRN Kesha Higgins NP        glucose chewable tablet 24 g  24 g Oral PRN Kesha Higgins NP        HYDROcodone-acetaminophen 5-325 mg per tablet 1 tablet  1 tablet Oral Q6H PRN Kesha Higgins NP   1 tablet at 11/01/22 2001    hydrOXYzine pamoate capsule 25 mg  25 mg Oral Q8H PRN Kesha Higgins NP        insulin aspart U-100 pen 0-5 Units  0-5 Units Subcutaneous QID (AC + HS) PRN Kesha Higgins NP   2 Units at 11/01/22 1702    LIDOcaine (PF) 10 mg/ml (1%) injection 100 mg  10 mL Intradermal Once Ariella Chapa MD        losartan tablet 100 mg  100 mg Oral Daily Kesha Higgins NP   100 mg at 11/02/22 0807    melatonin tablet 6 mg  6 mg Oral Nightly PRN Kesha Higgins NP        naloxone 0.4 mg/mL injection 0.02 mg  0.02 mg Intravenous PRN Kesha Higgins NP        ondansetron injection 4 mg  4 mg Intravenous Q8H PRN Kesha Higgins NP        sodium chloride 0.9% flush 10 mL  10 mL Intravenous Q12H PRN Kesha Higgins NP         Current Discharge Medication List        START taking these medications    Details   !! apixaban (ELIQUIS) 5 mg Tab Take 2 tablets (10 mg total) by mouth 2 (two) times daily. for 7 days  Qty: 28 tablet, Refills: 0    Comments: Then 5 mg PO BID      !! apixaban (ELIQUIS) 5 mg Tab Take 1 tablet (5 mg total) by mouth 2 (two) times daily.  Qty: 60 tablet, Refills: 2      hydrOXYzine pamoate (VISTARIL) 25 MG Cap Take 1 capsule (25 mg total) by mouth every 8 (eight) hours as needed (anxiety).  Qty: 21 capsule, Refills: 0      melatonin (MELATIN) 3 mg tablet Take 2 tablets (6 mg total) by mouth nightly as needed for Insomnia.  Qty: 30 tablet, Refills: 0       !! - Potential duplicate medications found. Please discuss with provider.        CONTINUE these medications which have CHANGED    Details   methocarbamoL (ROBAXIN) 500 MG Tab Take 2 tablets (1,000 mg total) by mouth 3 (three) times daily. for 5 days  Qty: 30  tablet, Refills: 0           CONTINUE these medications which have NOT CHANGED    Details   acetaminophen (TYLENOL) 500 MG tablet Take 500 mg by mouth every 6 (six) hours as needed for Pain.      fluticasone propionate (FLONASE) 50 mcg/actuation nasal spray 2 sprays (100 mcg total) by Each Nostril route once daily.  Qty: 16 g, Refills: 11      losartan (COZAAR) 100 MG tablet TAKE 1 TABLET BY MOUTH EVERY DAY  Qty: 90 tablet, Refills: 3      oxyCODONE-acetaminophen (PERCOCET) 5-325 mg per tablet Take 1 tablet by mouth every 6 (six) hours as needed for Pain.  Qty: 12 tablet, Refills: 0    Comments: Quantity prescribed more than 7 day supply? No      vitamin D (VITAMIN D3) 1000 units Tab Take 1,000 Units by mouth once daily.      co-enzyme Q-10 30 mg capsule Take 30 mg by mouth once daily.      rosuvastatin (CRESTOR) 20 MG tablet TAKE 1 TABLET BY MOUTH EVERY DAY  Qty: 90 tablet, Refills: 3    Associated Diagnoses: Pure hypercholesterolemia      zinc gluconate 50 mg tablet Take 50 mg by mouth once daily. PRN               I have seen and examined this patient within the last 30 days. My clinical findings that support the need for the home health skilled services and home bound status are the following:no   Requiring assistive device to leave home due to unsteady gait caused by  Malignancy/Cancer.     Diet:   diabetic diet 2000 calorie    Labs:      Referrals/ Consults  Physical Therapy to evaluate and treat. Evaluate for home safety and equipment needs; Establish/upgrade home exercise program. Perform / instruct on therapeutic exercises, gait training, transfer training, and Range of Motion.  Occupational Therapy to evaluate and treat. Evaluate home environment for safety and equipment needs. Perform/Instruct on transfers, ADL training, ROM, and therapeutic exercises.   to evaluate for community resources/long-range planning.  Aide to provide assistance with personal care, ADLs, and vital  signs.    Activities:   activity as tolerated    Nursing:   Agency to admit patient within 24 hours of hospital discharge unless specified on physician order or at patient request    SN to complete comprehensive assessment including routine vital signs. Instruct on disease process and s/s of complications to report to MD. Review/verify medication list sent home with the patient at time of discharge  and instruct patient/caregiver as needed. Frequency may be adjusted depending on start of care date.     Skilled nurse to perform up to 3 visits PRN for symptoms related to diagnosis    Notify MD if SBP > 160 or < 90; DBP > 90 or < 50; HR > 120 or < 50; Temp > 101; O2 < 88%; Other:       Ok to schedule additional visits based on staff availability and patient request on consecutive days within the home health episode.    When multiple disciplines ordered:    Start of Care occurs on Sunday - Wednesday schedule remaining discipline evaluations as ordered on separate consecutive days following the start of care.    Thursday SOC -schedule subsequent evaluations Friday and Monday the following week.     Friday - Saturday SOC - schedule subsequent discipline evaluations on consecutive days starting Monday of the following week.    For all post-discharge communication and subsequent orders please contact patient's primary care physician. If unable to reach primary care physician or do not receive response within 30 minutes, please contact [facility] on call  for clinical staff order clarification    Miscellaneous       Home Health Aide:  Nursing Three times weekly, Physical Therapy Three times weekly, Occupational Therapy Three times weekly, Medical Social Work Three times weekly, and Home Health Aide Three times weekly    Wound Care Orders      I certify that this patient is confined to his home and needs intermittent skilled nursing care, physical therapy, and occupational therapy.

## 2022-11-02 NOTE — PROGRESS NOTES
[place] - Med Surg  Adult Nutrition  Progress Note    SUMMARY       Recommendations  Recommendation:  1. Encourage intake at meals.   2. ADD Boost Glucose Control BID   3. Monitor weights/labs   4. RD to follow to monitor intake tolerance    Goals:   pt to consume at least 50-75% of meals by RD follow up  Nutrition Goal Status: new  Communication of RD Recs: reviewed with RN    Assessment and Plan  Nutrition Problem  Inadequate oral intake    Related to (etiology):   Dx, Hx    Signs and Symptoms (as evidenced by):   Loss of appetite, unintentional weight loss, 7% weight loss x 5 months    Interventions:  Commercial beverages  Collaborations with other providers    Nutrition Diagnosis Status:   New        Malnutrition Assessment  Weight Loss (Malnutrition): other (see comments) (7% x 5 months)       Moderate Weight Loss (Malnutrition): other (see comments) (7% x5 months)    Reason for Assessment  Reason For Assessment: identified at risk by screening criteria (MST)  Diagnosis: other (see comments) (DVT)  Relevant Medical History: HLD, DMll  Interdisciplinary Rounds: did not attend  General Information Comments: Pt was off the unit 2/2 bone scan and biopsy per family in the room. Was NPO today but recently on 2000kcal ADA diet. Per RN notes, pt has lost appetite 2/2 medications, with 25# unintentional weight loss in 6 months. Per note, pt has consumed 50% of meals. 7% weight loss x5 months. Angel 18- skin intact. Unable to assess NFPE 11/2- pt was off the unit  Nutrition Discharge Planning: dc on cardiac diet.    Nutrition Risk Screen  Nutrition Risk Screen: other (see comments) (loss of appetite, unintentional weight loss)    Nutrition/Diet History  Patient Reported Diet/Restrictions/Preferences: general  Spiritual, Cultural Beliefs, Christian Practices, Values that Affect Care: no  Factors Affecting Nutritional Intake: decreased appetite    Anthropometrics  Temp: 97.4 °F (36.3 °C)  Height Method: Stated  Height: 6'  "2" (188 cm)  Height (inches): 74 in  Weight Method: Bed Scale  Weight: 77.1 kg (169 lb 15.6 oz)  Weight (lb): 169.98 lb  Ideal Body Weight (IBW), Male: 190 lb  % Ideal Body Weight, Male (lb): 89.46 %  BMI (Calculated): 21.8  BMI Grade: 18.5-24.9 - normal  Weight Loss: unintentional  Usual Body Weight (UBW), k.6 kg (22)  % Usual Body Weight: 92.42  % Weight Change From Usual Weight: -7.78 %       Lab/Procedures/Meds  Pertinent Labs Reviewed: reviewed  Pertinent Labs Comments: rbc 2.28L, hgb 7.5L, hct 22.9L, mcv 100H, mch 32.9H, sodium 133L, CO2 21L, glu 141H, alk marianna 411H, alb 2.6L  Pertinent Medications Reviewed: reviewed  Pertinent Medications Comments: atorvastatin, lidocaine, losartan, insulin U-100    Estimated/Assessed Needs  Weight Used For Calorie Calculations: 77.1 kg (169 lb 15.6 oz)  Energy Calorie Requirements (kcal): 2313 (30kcal/kg)  Energy Need Method: Kcal/kg  Protein Requirements: 77.2-92 (*1.0g/kg-1.2g/kg)  Weight Used For Protein Calculations: 77.1 kg (169 lb 15.6 oz)  RDA Method (mL): 2313  CHO Requirement: 250      Nutrition Prescription Ordered  Current Diet Order: ADA    Evaluation of Received Nutrient/Fluid Intake  I/O: 360/2200  Energy Calories Required: not meeting needs  Protein Required: not meeting needs  Fluid Required: not meeting needs  Comments: LBM 10/31  Tolerance: not tolerating  % Intake of Estimated Energy Needs: 0 - 25 %  % Meal Intake: 25 - 50 %    Nutrition Risk  Level of Risk/Frequency of Follow-up:  (2xweekly)     Monitor and Evaluation  Food and Nutrient Intake: energy intake, food and beverage intake  Food and Nutrient Adminstration: diet order  Knowledge/Beliefs/Attitudes: beliefs and attitudes  Physical Activity and Function: factors affecting access to physical activity  Anthropometric Measurements: weight, weight change, body mass index  Biochemical Data, Medical Tests and Procedures: glucose/endocrine profile, inflammatory profile  Nutrition-Focused Physical " Findings: overall appearance     Nutrition Follow-Up  RD Follow-up?: Yes    Felicitas PINEDA-SAADIAN    Laurie Martins, RD,LDN

## 2022-11-02 NOTE — ASSESSMENT & PLAN NOTE
Patient has a current diagnosis of HTN which is uncontrolled.  Latest blood pressure and vitals reviewed-   Temp:  [97.4 °F (36.3 °C)-98.9 °F (37.2 °C)]   Pulse:  []   Resp:  [16-20]   BP: (101-148)/(49-66)   SpO2:  [93 %-97 %] .   Patient currently off IV antihypertensives.   Home meds for hypertension were reviewed and noted below.   Hypertension Medications             losartan (COZAAR) 100 MG tablet TAKE 1 TABLET BY MOUTH EVERY DAY          Medication adjustment for hospital antihypertensives is as follows- resume losartan     Will goal for controlled BP reduction as noted be medications noted above and monitor and mitigate end organ damage as indicated.

## 2022-11-02 NOTE — PROGRESS NOTES
"Progress Note  Pulmonary & Critical Care Medicine    Attending: Shital Lindsay  Fellow: Ariella Chapa  Admit Date: 10/31/2022  Today's Date: 11/02/2022      SUBJECTIVE:     NAEO. Laying in bed in good spirits and no acute distress.     OBJECTIVE:     Vital Signs Trends/Hx Reviewed  Vitals:    11/02/22 0434 11/02/22 0743 11/02/22 0845 11/02/22 1310   BP: (!) 148/64 (!) 145/66 133/62    Patient Position: Lying Lying     Pulse: 106 91 88    Resp: 18 18 18    Temp: 98.9 °F (37.2 °C)  97.4 °F (36.3 °C)    TempSrc: Oral Oral Skin    SpO2: 97% (!) 94% (!) 93%    Weight:   77.1 kg (170 lb) 77.1 kg (169 lb 15.6 oz)   Height:   6' 2" (1.88 m) 6' 2" (1.88 m)         Physical Exam:  General: NAD, cooperative & interactive.  HEENT: AT/NC, PERRL, EOMI, oral and nasal mucosa moist.   Neck: Supple without JVD or palpable LAD.   Cardiac: normal rate, regular rhythm, with no MRG with brisk cap refill and symmetric pulses in distal extremities.  Respiratory: Normal inspection. Symmetric chest rise. Normal palpation and percussion. Auscultation clear bilaterally. No increased work of breathing noted.   Abdomen: Soft, NT/ND. +BS. No hepatosplenomegaly.   Extremities: RUE in sling. LLE edema resolved.  Neuro: Grossly intact to brief exam. Oriented x3 with appropriate mood/affect to situation.       Laboratory:  No results for input(s): PH, PCO2, PO2, HCO3, POCSATURATED, BE in the last 24 hours.  Recent Labs   Lab 11/02/22  0517   WBC 12.49   RBC 2.28*   HGB 7.5*   HCT 22.9*   PLT 92*   *   MCH 32.9*   MCHC 32.8     Recent Labs   Lab 11/02/22  0517   *   K 4.1   CL 98   CO2 21*   BUN 16   CREATININE 0.7   MG 2.1       Microbiology Data:   Microbiology Results (last 7 days)       ** No results found for the last 168 hours. **             Chest Imaging:   No results found in the last 24 hours.      Infusions:  None      Scheduled Medications:    atorvastatin  80 mg Oral QHS    LIDOcaine (PF) 10 mg/ml (1%)  10 mL Intradermal " Once    losartan  100 mg Oral Daily       PRN Medications:   acetaminophen, dextrose 10%, dextrose 10%, glucagon (human recombinant), glucose, glucose, HYDROcodone-acetaminophen, hydrOXYzine pamoate, insulin aspart U-100, melatonin, naloxone, ondansetron, sodium chloride 0.9%    Problem List:   Patient Active Problem List   Diagnosis    Arthritis    Hearing loss of left ear    Hyperlipidemia    ED (erectile dysfunction)    Hypertension    IFG (impaired fasting glucose)    Diabetes mellitus, type II    DVT (deep venous thrombosis)    Fracture of right humerus    Anemia of chronic disease    Impacted cerumen of right ear    Lumbar pain    Bone lesion    Mass of right lung    Palliative care encounter    Anxiety       ASSESSMENT & RECOMMENDATIONS     #Right Pleural Effusion  -Bedside US revealed a 3.5-4cm pocket of pleural fluid for thoracentesis.  -Bedside thoracentesis performed today with 1L serous fluid removed.   -Fluid studies sent, including cytology and flow cytometry.   -Can resume therapeutic lovenox in the AM.      #Mediastinal Mass  #RLL Mass  #Hilar LAD  -Will need EBUS for further evaluation  -Sent message to [facility] main campus proceduralists Kimberly Toure and Krysta to evaluate for their schedule. Patient and wife aware.    Thank you for allowing us to participate in the care of this patient. Please call with questions.    Ariella Chapa M.D., PGY-V  [employer] Pulmonary/Critical Care Fellow

## 2022-11-02 NOTE — TELEPHONE ENCOUNTER
----- Message from Rj Wilson MD sent at 11/1/2022  5:00 PM CDT -----  Regarding: RE: hosp f/u  Thanks!    Felicitas, cancel appt with Sherri Jane on 11/4/22 and schedule with me during week of 11/14/22.    Thanks!  Rj  ----- Message -----  From: Radha Alcaraz  Sent: 11/1/2022   4:59 PM CDT  To: [facility] Staff  Subject: hosp f/u                                             Patient is being discharged from [facility] and is requiring a hospital follow up with Dr. Wilson on 11/14 if possible.  I am unable to schedule an appointment for that date.      Please schedule an appointment and message me back to advise patient prior to discharge.       DX:Mass of right lung          ThanksRadha Andrés/Discharge

## 2022-11-02 NOTE — PROCEDURES
"Joi Castellanos is a 77 y.o. male patient.    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 Written consent obtained.  Risks and benefits: risks, benefits and alternatives were discussed  Consent given by: patient  Patient understanding: patient states understanding of the procedure being performed  Patient consent: the patient's understanding of the procedure matches consent given  Procedure consent: procedure consent matches procedure scheduled  Relevant documents: relevant documents present and verified  Test results: test results available and properly labeled  Site marked: the operative site was marked  Imaging studies: imaging studies available  Required items: required blood products, implants, devices, and special equipment available  Patient identity confirmed: , name and MRN  Time out: Immediately prior to procedure a "time out" was called to verify the correct patient, procedure, equipment, support staff and site/side marked as required.  Procedure purpose: therapeutic and diagnostic  Indications: pleural effusion  Preparation: Patient was prepped and draped in the usual sterile fashion.  Local anesthesia used: yes    Anesthesia:  Local anesthesia used: yes  Local Anesthetic: lidocaine 1% without epinephrine  Anesthetic total: 10 mL    Patient sedated: no  Preparation: skin prepped with ChloraPrep  Patient position: sitting  Ultrasound " guidance: yes  Location: right lateral  Intercostal space: 5th  Puncture method: through-the-needle catheter  Needle size: 16  Catheter size: 8 Romansh  Number of attempts: 1  Drainage amount: 1000 ml  Drainage characteristics: serous  Patient tolerance: Patient tolerated the procedure well with no immediate complications  Chest x-ray performed: yes  Complications: No  Drainage Tube: removed      11/2/2022

## 2022-11-02 NOTE — ASSESSMENT & PLAN NOTE
Extensive left lower extremity deep vein thrombosis and partially occlusive thrombus in the right femoral vein.    Symptoms of L ankle edema occurred prior to noted fx.   With pathologic fx and multiple DVTs concerned for malignancy     hypercoagulable labs   -full dose lovenox with plans for DOAC at discharge   -consult hemoc   -PT/OT  To go home with homehealth, PT/OT

## 2022-11-02 NOTE — ASSESSMENT & PLAN NOTE
Hilar/RLL mass   PSA WNL   IGA elevated   Anemia   Brain MRI with early mets   New BLE DVTs in hypercoagulable state   Multiple bone lesions   Hemoc on board, working up Multiple myeloma   -palliative care consult. Also refer outpatient.   -pulmonary to perform diagnostic thoracentesis today   Plan to DC home today after thoracentesis   Outpatient EBUS per pulmonology   -close hemoc and pulm follow up

## 2022-11-02 NOTE — CONSULTS
Interventional Radiology Consult/Pre-Procedure Note      Chief Complaint/Reason for Consult: Bone mets, pathologic fracture    History of Present Illness:  Joi Castellanos is a 77 y.o. 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 Discussed with Oncology and Ortho. Ortho concerned that their may be two processes. Request bx of the right humeral lytic lesion at the sight of fracture and the posterior iliac spine, which appears sclerotic.    Admission H&P reviewed.    Past Medical History:   Diagnosis Date    Arthritis     Diabetes mellitus, type II     fasting bg criteria    Hyperlipidemia      Past Surgical History:   Procedure Laterality Date    TONSILLECTOMY         Allergies:   Review of patient's allergies indicates:   Allergen Reactions    Asa [aspirin]      Patient states he doesn't have allergy to medication       Scheduled Meds:    atorvastatin  80 mg Oral QHS    LIDOcaine (PF) 10 mg/ml (1%)  10 mL Intradermal Once    losartan  100 mg Oral Daily     Continuous Infusions:   PRN Meds:acetaminophen, dextrose 10%, dextrose 10%, glucagon (human recombinant), glucose, glucose, HYDROcodone-acetaminophen, hydrOXYzine pamoate, insulin aspart U-100, melatonin, naloxone, ondansetron, sodium chloride 0.9%    Anticoagulation/Antiplatelet Meds: Lovenox held    Review of Systems:   As documented in admission H&P.    Physical Exam:  Temp: 97.4 °F (36.3 °C) (11/02/22 0845)  Pulse: 88 (11/02/22 0845)  Resp: 18 (11/02/22 0845)  BP: 133/62 (11/02/22 0845)  SpO2: (!) 93 % (11/02/22 0845)     General: NAD  HEENT: Normocephalic, sclera anicteric, oropharynx clear  Neck: Supple, no palpable lymphadenopathy  Heart: RRR  Lungs: Symmetric excursions, breathing unlabored  Abd: NTND, soft  Extremities: DAMON, RUE ROM pain-limited  Neuro: Gross nonfocal    Labs:  Recent Labs   Lab 10/31/22  1407   INR 1.2       Recent Labs   Lab  11/02/22  0517   WBC 12.49   HGB 7.5*   HCT 22.9*   *   PLT 92*      Recent Labs   Lab 11/02/22  0517   *   *   K 4.1   CL 98   CO2 21*   BUN 16   CREATININE 0.7   CALCIUM 8.9   MG 2.1   ALT 29   AST 29   ALBUMIN 2.6*   BILITOT 0.6       Imaging:  CT CAP 10/31/22 reviewed.    Assessment/Plan:   Bone mets, mixed lytic and sclerotic. Will undergo bone bx of two separate lesions today.    Sedation:  Sedation history: have not been any systemic reactions  ASA: 3 / Mallampati: 2  Sedation plan: Up to moderate (Versed, fentanyl)     Risks (including, but not limited to, pain, bleeding, infection, damage to nearby structures, treatment failure/recurrence, and the need for additional procedures), potential benefits, and alternatives were discussed with the patient. All questions were answered to the best of my abilities. The patient wishes to proceed. Written informed consent was obtained.      Kranthi Fitzpatrick MD  [facility]  Pager [phone]

## 2022-11-02 NOTE — PLAN OF CARE
VN reviewed discharge instructions with pt. And wife. Using teach back method.  AVS printed and handed to pt by bedside nurse.  Reviewed follow-up appointments, medications, diet, and importance of medication compliance.  Reviewed home care instructions, treatment plan, self-management, and when to seek medical attention.  Allowed time for questions.  All questions answered.  Patient and wife verbalized complete understanding of discharge instructions and voices no concerns.     Discharge instructions complete.  Bedside delivery done.  Transport/wheelchair requested.  Bedside nurse notified.

## 2022-11-02 NOTE — ASSESSMENT & PLAN NOTE
In splint and sling since 10/26  Neurovascularly intact  -consult ortho   -bone scan   -IR for bone biopsy today 11/2  -may have IM nail at a later date   -referral sent to orthopedics for close follow up

## 2022-11-02 NOTE — PROGRESS NOTES
[facility] Medicine  Progress Note    Patient Name: Joi Castellanos  MRN: [id number]  Patient Class: OP- Observation   Admission Date: 10/31/2022  Length of Stay: 0 days  Attending Physician: Adrian Ramos MD  Primary Care Provider: Marc Lakhani MD        Subjective:     Principal Problem:DVT (deep venous thrombosis)        HPI:  78 yo male with a PMH of DM II, HLD, Hip replacement 2019, noted pathologic R humerus fx 10/26/22 presents today with LLE edema for about a week. He notes the edema to be prior to the fx occurring. Prior to the noted fracture, he states he caught his weight on the R arm after slipping back slightly while on the toilet.. Since 10/26 he has had prolonged sitting. He notes about 25 pounds of unintentional weight loss over the past 6 months. He notes R lumbar aching pain, constant for the past few days. He denies HA, vision changes, dizziness, lightheadedness, CP, SOB, abdominal pain, n/v/d/f/c/c, numbness, tingling, recent travel, melena, hemoptysis, hematemesis.     Family hx mother: cancer with spinal tumors    He has a follow up appointment with Hemoc planned for 11/4/22 for suspected possible malignancy.       Overview/Hospital Course:  No notes on file    Interval hx: Patient states he did not sleep well, and he has anxiety.    Noted R hilar, RLL mass   PSA WNL   IGA elevated   Multiple bone lesions   Early brain mets  Hemoc working up MM   Ortho consult   Palliative care consult.   -bone scan   -thoracentesis   -IR bone bx     Review of Systems   Constitutional:  Positive for activity change, appetite change, fatigue and unexpected weight change. Negative for chills, diaphoresis and fever.   HENT:  Negative for trouble swallowing and voice change.    Eyes:  Negative for photophobia and visual disturbance.   Respiratory:  Negative for cough, choking, chest tightness, shortness of breath, wheezing and stridor.    Cardiovascular:  Positive for leg swelling. Negative for  chest pain and palpitations.   Gastrointestinal:  Negative for abdominal pain, constipation, diarrhea, nausea and vomiting.   Genitourinary:  Negative for difficulty urinating, dysuria, enuresis, flank pain and hematuria.   Musculoskeletal:  Positive for arthralgias and back pain. Negative for myalgias.   Skin:  Negative for rash and wound.   Neurological:  Negative for dizziness, tremors, syncope, facial asymmetry, weakness, light-headedness, numbness and headaches.   Hematological:  Negative for adenopathy. Does not bruise/bleed easily.   Psychiatric/Behavioral:  The patient is nervous/anxious.    Objective:     Vital Signs (Most Recent):  Temp: 97.4 °F (36.3 °C) (11/02/22 0845)  Pulse: 88 (11/02/22 0845)  Resp: 18 (11/02/22 0845)  BP: 133/62 (11/02/22 0845)  SpO2: (!) 93 % (11/02/22 0845)   Vital Signs (24h Range):  Temp:  [97.4 °F (36.3 °C)-98.9 °F (37.2 °C)] 97.4 °F (36.3 °C)  Pulse:  [] 88  Resp:  [16-20] 18  SpO2:  [93 %-97 %] 93 %  BP: (101-148)/(49-66) 133/62     Weight: 77.1 kg (170 lb)  Body mass index is 21.83 kg/m².    Physical Exam  Constitutional:       Appearance: Normal appearance.      Comments: Appears thin    HENT:      Nose: Nose normal.      Mouth/Throat:      Pharynx: Oropharynx is clear.   Eyes:      Extraocular Movements: Extraocular movements intact.      Conjunctiva/sclera: Conjunctivae normal.   Cardiovascular:      Rate and Rhythm: Normal rate and regular rhythm.      Pulses: Normal pulses.      Heart sounds: Normal heart sounds.   Pulmonary:      Effort: Pulmonary effort is normal.      Breath sounds: Normal breath sounds.   Abdominal:      General: Abdomen is flat. Bowel sounds are normal.      Palpations: Abdomen is soft.   Musculoskeletal:         General: Swelling present. Normal range of motion.      Cervical back: Normal range of motion and neck supple.      Comments: L ankle edema     RUE in splint and sling    Skin:     General: Skin is warm and dry.   Neurological:       General: No focal deficit present.      Mental Status: He is alert and oriented to person, place, and time.      Sensory: No sensory deficit.           Significant Labs: All pertinent labs within the past 24 hours have been reviewed.    Significant Imaging: I have reviewed all pertinent imaging results/findings within the past 24 hours.      Assessment/Plan:      * DVT (deep venous thrombosis)  Extensive left lower extremity deep vein thrombosis and partially occlusive thrombus in the right femoral vein.    Symptoms of L ankle edema occurred prior to noted fx.   With pathologic fx and multiple DVTs concerned for malignancy     hypercoagulable labs   -full dose lovenox with plans for DOAC at discharge   -consult hemoc   -PT/OT  May need placement with debilitated state     Anxiety    And insomnia   - PRN melatonin   -PRN vistaril     Palliative care encounter        Mass of right lung    Hilar/RLL mass   PSA WNL   IGA elevated   Anemia   Brain MRI with early mets   New BLE DVTs in hypercoagulable state   Multiple bone lesions   Hemoc on board, working up Multiple myeloma   -palliative care consult   -pulmonary to perform diagnostic thoracentesis today     Bone lesion        Lumbar pain   -xray lumbar spine -L1 mild height loss. This is likely a mets bone lesion     Impacted cerumen of right ear    -liquid colace and normal saline flush     Anemia of chronic disease  Anemia panel noted 9/2022  Hgb 10.5--> 8.3--> 7.4 over the past 5 months   Will transfuse if < 7 or symptomatic   Continue to monitor   hemoc consulted     Fracture of right humerus    In splint and sling since 10/26  Neurovascularly intact  -consult ortho   -bone scan   -IR for bone biopsy today 11/2  -may have IM nail at a later date     Diabetes mellitus, type II  Patient's FSGs are controlled on current medication regimen.  Last A1c reviewed-   Lab Results   Component Value Date    HGBA1C 6.1 (H) 09/08/2022     Most recent fingerstick glucose  reviewed-   Recent Labs   Lab 11/01/22  1211 11/01/22  1544 11/01/22  1929 11/02/22  0506   POCTGLUCOSE 187* 212* 132* 142*     Current correctional scale  Low    Hold Oral hypoglycemics while patient is in the hospital.    Hypertension    Patient has a current diagnosis of HTN which is uncontrolled.  Latest blood pressure and vitals reviewed-   Temp:  [97.4 °F (36.3 °C)-98.9 °F (37.2 °C)]   Pulse:  []   Resp:  [16-20]   BP: (101-148)/(49-66)   SpO2:  [93 %-97 %] .   Patient currently off IV antihypertensives.   Home meds for hypertension were reviewed and noted below.   Hypertension Medications             losartan (COZAAR) 100 MG tablet TAKE 1 TABLET BY MOUTH EVERY DAY          Medication adjustment for hospital antihypertensives is as follows- resume losartan     Will goal for controlled BP reduction as noted be medications noted above and monitor and mitigate end organ damage as indicated.        Hyperlipidemia    Lab Results   Component Value Date    LDLCALC 63.4 06/08/2022     Resume statin       VTE Risk Mitigation (From admission, onward)    None          Discharge Planning   ALEJANDRO: 11/2/2022     Code Status: Full Code   Is the patient medically ready for discharge?:     Reason for patient still in hospital (select all that apply): Patient trending condition  Discharge Plan A: Home with family                  Kesha Higgins NP  Department of Hospital Medicine   [facility]

## 2022-11-02 NOTE — PROCEDURES
Interventional Radiology Immediate Post-Procedure Note    Pre-Op Diagnosis: Bone mets  Post-Op Diagnosis: Same    Procedure: CT-guided coaxial core needle bx    Procedure performed by: Kranthi Fitzpatrick MD  Assistants: None    Estimated Blood Loss: Minimal  Specimen Removed: Yes    Findings/description of procedure:  Successful CT-guided coaxial core needle bx of two separate lesions.    18-ga cores x6 taken from a lytic lesion in the right humerus at the site of known fracture.    13-ga cores x2 taken from the posterior iliac spine which appears sclerotic.    No immediate complications. Patient tolerated procedure well. Please see full dictated procedure report for additional details and recommendations.      Kranthi Fitzpatrick MD  [facility]  Pager [phone]

## 2022-11-02 NOTE — PT/OT/SLP PROGRESS
"Physical Therapy      Patient Name:  Joi Castellanos   MRN:  [id number]    Patient not seen today secondary to Off the floor for procedure/surgery.Patient at IR, then states he is going to have his "lungs tapped."  Patient states he is fatigued and wants to rest.  Spouse and son present.  Discussed Home Health therapy as well as aide.   Will follow-up.    "

## 2022-11-02 NOTE — ASSESSMENT & PLAN NOTE
Hilar/RLL mass   PSA WNL   IGA elevated   Anemia   Brain MRI with early mets   New BLE DVTs in hypercoagulable state   Multiple bone lesions   Hemoc on board, working up Multiple myeloma   -palliative care consult   -pulmonary to perform diagnostic thoracentesis today

## 2022-11-02 NOTE — TREATMENT PLAN
[facility] RN met with patient , spouse and son regarding [facility] hospital follow up upon discharge. Pt agreeable to hospital follow up. Virtual visit offered due to wife unable to drive but family to provide transportation. RN informed pt of scheduled appointment and that appointment will also appear on d/c AVS. Patient informed to bring portable home O2 if used and all medication bottles to [facility] follow up appointment.  [facility] information handout, appointment letter and folder provided to patient.Family  to provide transportation to appointment; unable to confirm person at this time.      Patient Contact info:   Cally Castellanos (Spouse)   [phone] (Mobile)       Person providing transportation contact info: unknown    Barriers to attending [facility] visit: transportation, unable to confirm at this time      Future Appointments   Date Time Provider Department Center   11/14/2022  9:40 AM Rj Wilson MD [facility] HEM ONC [facility]   11/15/2022  1:30 PM Brittanie Gonzalez MD [facility] IMPRI [facility]     Zeynep Vu RN-[facility]  [phone]

## 2022-11-02 NOTE — ASSESSMENT & PLAN NOTE
In splint and sling since 10/26  Neurovascularly intact  -consult ortho   -bone scan   -IR for bone biopsy today 11/2  -may have IM nail at a later date

## 2022-11-02 NOTE — ASSESSMENT & PLAN NOTE
Patient's FSGs are controlled on current medication regimen.  Last A1c reviewed-   Lab Results   Component Value Date    HGBA1C 6.1 (H) 09/08/2022     Most recent fingerstick glucose reviewed-   Recent Labs   Lab 11/01/22  1544 11/01/22  1929 11/02/22  0506   POCTGLUCOSE 212* 132* 142*     Current correctional scale  Low    Hold Oral hypoglycemics while patient is in the hospital.

## 2022-11-02 NOTE — DISCHARGE SUMMARY
[facility] Medicine  Discharge Summary      Patient Name: Joi Castellanos  MRN: [id number]  RAMIRO: [id number]  Patient Class: OP- Observation  Admission Date: 10/31/2022  Hospital Length of Stay: 0 days  Discharge Date and Time:  11/02/2022 3:14 PM  Attending Physician: Adrian Ramos MD   Discharging Provider: Kesha Higgins NP  Primary Care Provider: Marc Lakhani MD    Primary Care Team: Networked reference to record PCT     HPI:   78 yo male with a PMH of DM II, HLD, Hip replacement 2019, noted pathologic R humerus fx 10/26/22 presents today with LLE edema for about a week. He notes the edema to be prior to the fx occurring. Prior to the noted fracture, he states he caught his weight on the R arm after slipping back slightly while on the toilet.. Since 10/26 he has had prolonged sitting. He notes about 25 pounds of unintentional weight loss over the past 6 months. He notes R lumbar aching pain, constant for the past few days. He denies HA, vision changes, dizziness, lightheadedness, CP, SOB, abdominal pain, n/v/d/f/c/c, numbness, tingling, recent travel, melena, hemoptysis, hematemesis.     Family hx mother: cancer with spinal tumors    He has a follow up appointment with Hemoc planned for 11/4/22 for suspected possible malignancy.       * No surgery found *      Hospital Course:   No notes on file     Goals of Care Treatment Preferences:  Code Status: Full Code    Health care agent: Cally Castellanos  Health care agent number: [phone]          What is most important right now is to focus on spending time at home, symptom/pain control, extending life as long as possible, even it it means sacrificing quality, curative/life-prolongation (regardless of treatment burdens).  Accordingly, we have decided that the best plan to meet the patient's goals includes continuing with treatment.      Consults:   Consults (From admission, onward)        Status Ordering Provider     Inpatient consult to  Pulmonology  Once        Provider:  (Not yet assigned)    Completed DEANDRE LO     Inpatient consult to Interventional Radiology  Once        Provider:  (Not yet assigned)    Completed CODY GAMING     Inpatient consult to Social Work  Once        Provider:  (Not yet assigned)    Acknowledged CODY GAMING     Inpatient consult to Palliative Care  Once        Provider:  (Not yet assigned)    Completed DEANDRE LO     Inpatient consult to Orthopedic Surgery  Once        Provider:  (Not yet assigned)    Acknowledged DEANDRE LO     Inpatient consult to Hematology/Oncology  Once        Provider:  (Not yet assigned)    Completed DEANDRE LO          * DVT (deep venous thrombosis)  Extensive left lower extremity deep vein thrombosis and partially occlusive thrombus in the right femoral vein.    Symptoms of L ankle edema occurred prior to noted fx.   With pathologic fx and multiple DVTs concerned for malignancy     hypercoagulable labs   -full dose lovenox with plans for DOAC at discharge   -consult hemoc   -PT/OT  To go home with homehealth, PT/OT    Anxiety    And insomnia   - PRN melatonin   -PRN vistaril     Palliative care encounter        Mass of right lung    Hilar/RLL mass   PSA WNL   IGA elevated   Anemia   Brain MRI with early mets   New BLE DVTs in hypercoagulable state   Multiple bone lesions   Hemoc on board, working up Multiple myeloma   -palliative care consult. Also refer outpatient.   -pulmonary to perform diagnostic thoracentesis today   Plan to DC home today after thoracentesis   Outpatient EBUS per pulmonology   -close hemoc and pulm follow up     Bone lesion        Lumbar pain   -xray lumbar spine -L1 mild height loss. This is likely a mets bone lesion     Impacted cerumen of right ear    -liquid colace and normal saline flush     Anemia of chronic disease  Anemia panel noted 9/2022  Hgb 10.5--> 8.3--> 7.4 over the past 5 months   Will  transfuse if < 7 or symptomatic   Continue to monitor   hemoc consulted     Fracture of right humerus    In splint and sling since 10/26  Neurovascularly intact  -consult ortho   -bone scan   -IR for bone biopsy today 11/2  -may have IM nail at a later date   -referral sent to orthopedics for close follow up       Diabetes mellitus, type II  Patient's FSGs are controlled on current medication regimen.  Last A1c reviewed-   Lab Results   Component Value Date    HGBA1C 6.1 (H) 09/08/2022     Most recent fingerstick glucose reviewed-   Recent Labs   Lab 11/01/22  1544 11/01/22  1929 11/02/22  0506   POCTGLUCOSE 212* 132* 142*     Current correctional scale  Low    Hold Oral hypoglycemics while patient is in the hospital.    Hypertension    Patient has a current diagnosis of HTN which is uncontrolled.  Latest blood pressure and vitals reviewed-   Temp:  [97.4 °F (36.3 °C)-98.9 °F (37.2 °C)]   Pulse:  []   Resp:  [16-20]   BP: (101-148)/(49-66)   SpO2:  [93 %-97 %] .   Patient currently off IV antihypertensives.   Home meds for hypertension were reviewed and noted below.   Hypertension Medications             losartan (COZAAR) 100 MG tablet TAKE 1 TABLET BY MOUTH EVERY DAY          Medication adjustment for hospital antihypertensives is as follows- resume losartan     Will goal for controlled BP reduction as noted be medications noted above and monitor and mitigate end organ damage as indicated.        Hyperlipidemia    Lab Results   Component Value Date    LDLCALC 63.4 06/08/2022     Resume statin       Final Active Diagnoses:    Diagnosis Date Noted POA    PRINCIPAL PROBLEM:  DVT (deep venous thrombosis) [I82.409] 10/31/2022 Unknown    Anxiety [F41.9] 11/02/2022 Yes    Mass of right lung [R91.8] 11/01/2022 Yes    Palliative care encounter [Z51.5] 11/01/2022 Not Applicable    Fracture of right humerus [S42.301A] 10/31/2022 Yes    Anemia of chronic disease [D63.8] 10/31/2022 Yes    Impacted cerumen of right  ear [H61.21] 10/31/2022 Unknown    Lumbar pain [M54.50] 10/31/2022 Unknown    Bone lesion [M89.9] 10/31/2022 Yes    Diabetes mellitus, type II [E11.9]  Yes    Hypertension [I10] 03/22/2017 Yes    Hyperlipidemia [E78.5] 08/19/2015 Yes      Problems Resolved During this Admission:       Discharged Condition: stable    [place] [facility] [place]:   Follow-up Information     [facility] Follow up.    Specialties: Home Health Services, Home Therapy Services  Why: [employer]  Contact information:  [address], [facility] [address]  [phone]           Marc Lakhani MD Follow up in 1 week(s).    Specialty: Family Medicine  Contact information:  [address] [place]  [address]  [facility] [address]  [phone]                       Patient Instructions:      Ambulatory referral/consult to Hematology / Oncology   Standing Status: Future   Referral Priority: Routine Referral Type: Consultation   Referral Reason: Specialty Services Required   Requested Specialty: Hematology and Oncology   Number of Visits Requested: 1     Ambulatory referral/consult to CLINIC Palliative Care   Standing Status: Future   Referral Priority: Routine Referral Type: Consultation   Requested Specialty: Palliative Medicine   Number of Visits Requested: 1     Ambulatory referral/consult to Orthopedics   Standing Status: Future   Referral Priority: Routine Referral Type: Consultation   Requested Specialty: Orthopedic Surgery   Number of Visits Requested: 1     Ambulatory referral/consult to Pulmonology   Standing Status: Future   Referral Priority: Routine Referral Type: Consultation   Referral Reason: Specialty Services Required   Requested Specialty: Pulmonary Disease   Number of Visits Requested: 1     Diet diabetic     Notify your health care provider if you experience any of the following:  temperature >100.4     Notify your health care provider if you experience any  of the following:  persistent nausea and vomiting or diarrhea     Notify your health care provider if you experience any of the following:  severe uncontrolled pain     Notify your health care provider if you experience any of the following:  redness, tenderness, or signs of infection (pain, swelling, redness, odor or green/yellow discharge around incision site)     Notify your health care provider if you experience any of the following:  difficulty breathing or increased cough     Notify your health care provider if you experience any of the following:  severe persistent headache     Notify your health care provider if you experience any of the following:  worsening rash     Notify your health care provider if you experience any of the following:  persistent dizziness, light-headedness, or visual disturbances     Notify your health care provider if you experience any of the following:  increased confusion or weakness     Activity as tolerated       Significant Diagnostic Studies: Labs:   CMP   Recent Labs   Lab 11/01/22  0505 11/02/22  0517   * 133*   K 4.3 4.1   CL 97 98   CO2 24 21*   * 141*   BUN 14 16   CREATININE 0.7 0.7   CALCIUM 8.6* 8.9   PROT 6.8 7.0   ALBUMIN 2.6* 2.6*   BILITOT 0.6 0.6   ALKPHOS 450* 411*   AST 33 29   ALT 32 29   ANIONGAP 12 14    and CBC   Recent Labs   Lab 11/01/22  0505 11/02/22  0517   WBC 12.16 12.49   HGB 7.4* 7.5*   HCT 22.1* 22.9*   PLT 90* 92*       Pending Diagnostic Studies:     Procedure Component Value Units Date/Time    CT Biopsy Bone Deep [[id number]] Resulted: 11/02/22 1012    Order Status: Sent Lab Status: In process Updated: 11/02/22 1133    Specimen to Pathology, Radiology Other, please specify [[id number]] Collected: 11/02/22 1110    Order Status: Sent Lab Status: In process Updated: 11/02/22 1209         Medications:  Reconciled Home Medications:      Medication List      START taking these medications    * ELIQUIS 5 mg Tab  Generic drug: apixaban  Take 2  tablets (10 mg total) by mouth 2 (two) times daily. for 7 days, then 1 tablet (5mg) 2 times daily thereafter     * apixaban 5 mg Tab  Commonly known as: ELIQUIS  Take 1 tablet (5 mg total) by mouth 2 (two) times daily.     hydrOXYzine pamoate 25 MG Cap  Commonly known as: VISTARIL  Take 1 capsule (25 mg total) by mouth every 8 (eight) hours as needed (anxiety).     melatonin 3 mg tablet  Commonly known as: MELATIN  Take 2 tablets (6 mg total) by mouth nightly as needed for Insomnia.         * This list has 2 medication(s) that are the same as other medications prescribed for you. Read the directions carefully, and ask your doctor or other care provider to review them with you.            CONTINUE taking these medications    acetaminophen 500 MG tablet  Commonly known as: TYLENOL  Take 500 mg by mouth every 6 (six) hours as needed for Pain.     co-enzyme Q-10 30 mg capsule  Take 30 mg by mouth once daily.     fluticasone propionate 50 mcg/actuation nasal spray  Commonly known as: FLONASE  2 sprays (100 mcg total) by Each Nostril route once daily.     losartan 100 MG tablet  Commonly known as: COZAAR  TAKE 1 TABLET BY MOUTH EVERY DAY     methocarbamoL 500 MG Tab  Commonly known as: ROBAXIN  Take 2 tablets (1,000 mg total) by mouth 3 (three) times daily. for 5 days     oxyCODONE-acetaminophen 5-325 mg per tablet  Commonly known as: PERCOCET  Take 1 tablet by mouth every 6 (six) hours as needed for Pain.     rosuvastatin 20 MG tablet  Commonly known as: CRESTOR  TAKE 1 TABLET BY MOUTH EVERY DAY     vitamin D 1000 units Tab  Commonly known as: VITAMIN D3  Take 1,000 Units by mouth once daily.     zinc gluconate 50 mg tablet  Take 50 mg by mouth once daily. PRN            Indwelling Lines/Drains at time of discharge:   Lines/Drains/Airways     None                 Time spent on the discharge of patient: 30 minutes         Kesha Higgins NP  Department of Hospital Medicine  [facility]

## 2022-11-02 NOTE — PLAN OF CARE
CM discussed dispo with wife Cally.  Ms Alamo is requesting 24/7 assistance at home for help with ADL's,educated on differences between SNF and [facility] placement, pt's needs are considered [facility].  Skilled needs from PT/OT can be met using HH services.  Insurance does not cover [facility] placement and would be out of pocket cost.  Also informed Ms Alamo that any possible cancer treatment and outpatient appts would be the responsibilty of the family should pt go [facility], NH will not accept responsibility to treatment cost, transportation to and from appointments, etc.  Informed that any placement at this time could cause delay in treatment options.  Ms Alamo verbalized understanding and has elected to return home with HH services.  Informed that therapy services would be provided 2-3 days a week along with nursing as covered by insurance.  Primary team notified as this is best plan to decrease delay in further care.    CM attempted to discuss out of pocket resources with Ms Alamo with personalized sitter services.  Will place in pt's AVS should she need services further down the line.    Pt has been accepted by [facility] for start of care day following hospital d/c.    Thea Monk RN CCM  Super[facility]  [phone]

## 2022-11-02 NOTE — SUBJECTIVE & OBJECTIVE
Interval hx: Patient states he did not sleep well, and he has anxiety.    Noted R hilar, RLL mass   PSA WNL   IGA elevated   Multiple bone lesions   Early brain mets  Hemoc working up MM   Ortho consult   Palliative care consult.   -bone scan   -thoracentesis   -IR bone bx     Review of Systems   Constitutional:  Positive for activity change, appetite change, fatigue and unexpected weight change. Negative for chills, diaphoresis and fever.   HENT:  Negative for trouble swallowing and voice change.    Eyes:  Negative for photophobia and visual disturbance.   Respiratory:  Negative for cough, choking, chest tightness, shortness of breath, wheezing and stridor.    Cardiovascular:  Positive for leg swelling. Negative for chest pain and palpitations.   Gastrointestinal:  Negative for abdominal pain, constipation, diarrhea, nausea and vomiting.   Genitourinary:  Negative for difficulty urinating, dysuria, enuresis, flank pain and hematuria.   Musculoskeletal:  Positive for arthralgias and back pain. Negative for myalgias.   Skin:  Negative for rash and wound.   Neurological:  Negative for dizziness, tremors, syncope, facial asymmetry, weakness, light-headedness, numbness and headaches.   Hematological:  Negative for adenopathy. Does not bruise/bleed easily.   Psychiatric/Behavioral:  The patient is nervous/anxious.    Objective:     Vital Signs (Most Recent):  Temp: 97.4 °F (36.3 °C) (11/02/22 0845)  Pulse: 88 (11/02/22 0845)  Resp: 18 (11/02/22 0845)  BP: 133/62 (11/02/22 0845)  SpO2: (!) 93 % (11/02/22 0845)   Vital Signs (24h Range):  Temp:  [97.4 °F (36.3 °C)-98.9 °F (37.2 °C)] 97.4 °F (36.3 °C)  Pulse:  [] 88  Resp:  [16-20] 18  SpO2:  [93 %-97 %] 93 %  BP: (101-148)/(49-66) 133/62     Weight: 77.1 kg (170 lb)  Body mass index is 21.83 kg/m².    Physical Exam  Constitutional:       Appearance: Normal appearance.      Comments: Appears thin    HENT:      Nose: Nose normal.      Mouth/Throat:      Pharynx:  Oropharynx is clear.   Eyes:      Extraocular Movements: Extraocular movements intact.      Conjunctiva/sclera: Conjunctivae normal.   Cardiovascular:      Rate and Rhythm: Normal rate and regular rhythm.      Pulses: Normal pulses.      Heart sounds: Normal heart sounds.   Pulmonary:      Effort: Pulmonary effort is normal.      Breath sounds: Normal breath sounds.   Abdominal:      General: Abdomen is flat. Bowel sounds are normal.      Palpations: Abdomen is soft.   Musculoskeletal:         General: Swelling present. Normal range of motion.      Cervical back: Normal range of motion and neck supple.      Comments: L ankle edema     RUE in splint and sling    Skin:     General: Skin is warm and dry.   Neurological:      General: No focal deficit present.      Mental Status: He is alert and oriented to person, place, and time.      Sensory: No sensory deficit.           Significant Labs: All pertinent labs within the past 24 hours have been reviewed.    Significant Imaging: I have reviewed all pertinent imaging results/findings within the past 24 hours.

## 2022-11-02 NOTE — NURSING
Pt arrived in IR pre/post area, VS denies pain, room air, reviewed POC with NM scan @ 0930, prior to bone bx, awaiting consent for procedure, continue to monitor

## 2022-11-02 NOTE — PLAN OF CARE
Recommendation:  1. Encourage intake at meals.   2. ADD Boost Glucose Control BID   3. Monitor weights/labs   4. RD to follow to monitor intake tolerance    Goals:   pt to consume at least 50-75% of meals by RD follow up  Nutrition Goal Status: new  Communication of RD Recs: reviewed with RN

## 2022-11-02 NOTE — ASSESSMENT & PLAN NOTE
Patient's FSGs are controlled on current medication regimen.  Last A1c reviewed-   Lab Results   Component Value Date    HGBA1C 6.1 (H) 09/08/2022     Most recent fingerstick glucose reviewed-   Recent Labs   Lab 11/01/22  1211 11/01/22  1544 11/01/22  1929 11/02/22  0506   POCTGLUCOSE 187* 212* 132* 142*     Current correctional scale  Low    Hold Oral hypoglycemics while patient is in the hospital.

## 2022-11-02 NOTE — CARE UPDATE
Spoke to spouse Cally. Updated to patient status, plan of care.   She voiced understanding. All questions answered.

## 2022-11-03 ENCOUNTER — TELEPHONE (OUTPATIENT)
Dept: PULMONOLOGY | Facility: CLINIC | Age: 77
End: 2022-11-03
Payer: MEDICARE

## 2022-11-03 LAB
AT III ACT/NOR PPP CHRO: 85 % (ref 83–118)
FLOW CYTOMETRY ANTIBODIES ANALYZED - FLUID: NORMAL
FLOW CYTOMETRY COMMENT - FLUID: NORMAL
FLOW CYTOMETRY INTERPRETATION - FLUID: NORMAL
FLUID TYPE: NORMAL

## 2022-11-03 PROCEDURE — G0180 PR HOME HEALTH MD CERTIFICATION: ICD-10-PCS | Mod: ,,, | Performed by: NURSE PRACTITIONER

## 2022-11-03 PROCEDURE — G0180 MD CERTIFICATION HHA PATIENT: HCPCS | Mod: ,,, | Performed by: NURSE PRACTITIONER

## 2022-11-03 NOTE — TELEPHONE ENCOUNTER
Patient's wife states patient was discharged this evening and called to inquire if patient should take his evening dose of Eliquis. Patient's discharge instruction state to take Eliquis 10mg two times daily for 7 days. Patient stated he did not have his dose today.     Care advice given to take Eliquis 10 mg as prescribed and to take evening dose today. Patient's wife states understanding of care advice and cite no additional concerns at this time.    Reason for Disposition   Caller has medicine question only, adult not sick, AND triager answers question    Additional Information   Negative: [1] Caller is not with the adult (patient) AND [2] reporting urgent symptoms   Negative: Lab result questions   Medication questions   Negative: [1] Intentional drug overdose AND [2] suicidal thoughts or ideas   Negative: Drug overdose and triager unable to answer question   Negative: Caller requesting a renewal or refill of a medicine patient is currently taking   Negative: Caller requesting information unrelated to medicine   Negative: Caller requesting information about COVID-19 Vaccine   Negative: Caller requesting information about Emergency Contraception   Negative: Caller requesting information about Combined Birth Control Pills   Negative: Caller requesting information about Progestin Birth Control Pills   Negative: Caller requesting information about Post-Op pain or medicines   Negative: Caller requesting a prescription antibiotic (such as Penicillin) for Strep throat and has a positive culture result   Negative: Caller requesting a prescription anti-viral med (such as Tamiflu) and has influenza (flu) symptoms   Negative: Immunization reaction suspected   Negative: Rash while taking a medicine or within 3 days of stopping it   Negative: [1] Asthma and [2] having symptoms of asthma (cough, wheezing, etc.)   Negative: [1] Symptom of illness (e.g., headache, abdominal pain, earache, vomiting) AND [2] more than mild    Negative: Breastfeeding questions about mother's medicines and diet   Negative: MORE THAN A DOUBLE DOSE of a prescription or over-the-counter (OTC) drug   Negative: [1] DOUBLE DOSE (an extra dose or lesser amount) of prescription drug AND [2] any symptoms (e.g., dizziness, nausea, pain, sleepiness)   Negative: [1] DOUBLE DOSE (an extra dose or lesser amount) of over-the-counter (OTC) drug AND [2] any symptoms (e.g., dizziness, nausea, pain, sleepiness)   Negative: Took another person's prescription drug   Negative: [1] DOUBLE DOSE (an extra dose or lesser amount) of prescription drug AND [2] NO symptoms (Exception: a double dose of antibiotics)   Negative: Diabetes drug error or overdose (e.g., took wrong type of insulin or took extra dose)   Negative: [1] Prescription not at pharmacy AND [2] was prescribed by PCP recently (Exception: triager has access to EMR and prescription is recorded there. Go to Home Care and confirm for pharmacy.)   Negative: [1] Pharmacy calling with prescription question AND [2] triager unable to answer question   Negative: [1] Caller has URGENT medicine question about med that PCP or specialist prescribed AND [2] triager unable to answer question   Negative: Medicine patch causing local rash or itching   Negative: [1] Caller has medicine question about med NOT prescribed by PCP AND [2] triager unable to answer question (e.g., compatibility with other med, storage)   Negative: Prescription request for new medicine (not a refill)   Negative: [1] Caller has NON-URGENT medicine question about med that PCP prescribed AND [2] triager unable to answer question   Negative: Caller wants to use a complementary or alternative medicine   Negative: [1] Prescription prescribed recently is not at pharmacy AND [2] triager has access to patient's EMR AND [3] prescription is recorded in the EMR   Negative: [1] DOUBLE DOSE (an extra dose or lesser amount) of over-the-counter (OTC) drug AND [2] NO symptoms    Negative: [1] DOUBLE DOSE (an extra dose or lesser amount) of antibiotic drug AND [2] NO symptoms    Protocols used: Information Only Call - No Triage-A-AH, Medication Question Call-A-AH

## 2022-11-03 NOTE — TELEPHONE ENCOUNTER
----- Message from Britany Smith MA sent at 11/3/2022  5:05 PM CDT -----  Regarding: FW: HFU    ----- Message -----  From: Viktoriya Agrawal  Sent: 11/3/2022   9:17 AM CDT  To: Jamarcus NICHOLAS Staff  Subject: HFU                                              Patient discharged from [facility] and needs a HFU with Pulmonary.  Please schedule soonest appointment and message me back so we can relay appointment information to patient  and DC Nurse can chart.    DX: Mass of right lung     Mary Stroud  Access Navigator/Discharge

## 2022-11-04 ENCOUNTER — TELEPHONE (OUTPATIENT)
Dept: ORTHOPEDICS | Facility: CLINIC | Age: 77
End: 2022-11-04
Payer: MEDICARE

## 2022-11-04 LAB — CHOLEST FLD-MCNC: 79 MG/DL

## 2022-11-04 NOTE — TELEPHONE ENCOUNTER
----- Message from Aida Cisneros RN sent at 11/4/2022 10:29 AM CDT -----  Regarding: FW: hosp f/u  Please see if this referral needs to go to [facility]  Thanks!  ----- Message -----  From: Aida Cisneros RN  Sent: 11/1/2022   5:14 PM CDT  To: Aida Cisneros RN  Subject: FW: hosp f/u                                   From: Radha Alcaraz  Sent: 11/1/2022   3:34 PM CDT  To: [facility] Staff  Subject: hosp f/u                                     Patient is being discharged from [facility] and is requiring a hospital follow up with Dr. Merino within 1-2 weeks.  I am unable to schedule an appointment within that time frame.      Please schedule a sooner appointment and message me back to advise patient prior to discharge. DX:Fracture of right humerusThanksRadha  Access Andrés/Discharge     I sent Ms Garcia a message I need more info & to explain where Dr Merino is   no response yet  she did not leave a phone number  will try again Monday

## 2022-11-07 ENCOUNTER — TELEPHONE (OUTPATIENT)
Dept: ORTHOPEDICS | Facility: CLINIC | Age: 77
End: 2022-11-07
Payer: MEDICARE

## 2022-11-07 LAB
FINAL PATHOLOGIC DIAGNOSIS: NORMAL
GROSS: NORMAL
Lab: NORMAL
MICROSCOPIC EXAM: NORMAL
PROT C ACT/NOR PPP CHRO: NORMAL %
PROT S ACT/NOR PPP: NORMAL %

## 2022-11-08 ENCOUNTER — OFFICE VISIT (OUTPATIENT)
Dept: PALLIATIVE MEDICINE | Facility: CLINIC | Age: 77
End: 2022-11-08
Payer: MEDICARE

## 2022-11-08 VITALS — SYSTOLIC BLOOD PRESSURE: 135 MMHG | HEART RATE: 97 BPM | OXYGEN SATURATION: 97 % | DIASTOLIC BLOOD PRESSURE: 60 MMHG

## 2022-11-08 DIAGNOSIS — I82.4Y2 ACUTE DEEP VEIN THROMBOSIS (DVT) OF PROXIMAL VEIN OF LEFT LOWER EXTREMITY: ICD-10-CM

## 2022-11-08 DIAGNOSIS — Z71.89 ADVANCED CARE PLANNING/COUNSELING DISCUSSION: ICD-10-CM

## 2022-11-08 DIAGNOSIS — S42.291D OTHER CLOSED DISPLACED FRACTURE OF PROXIMAL END OF RIGHT HUMERUS WITH ROUTINE HEALING, SUBSEQUENT ENCOUNTER: ICD-10-CM

## 2022-11-08 DIAGNOSIS — G89.3 CANCER ASSOCIATED PAIN: Primary | ICD-10-CM

## 2022-11-08 DIAGNOSIS — R91.8 MASS OF RIGHT LUNG: ICD-10-CM

## 2022-11-08 LAB — BACTERIA FLD CULT: NORMAL

## 2022-11-08 PROCEDURE — 99999 PR PBB SHADOW E&M-EST. PATIENT-LVL III: CPT | Mod: PBBFAC,,, | Performed by: STUDENT IN AN ORGANIZED HEALTH CARE EDUCATION/TRAINING PROGRAM

## 2022-11-08 PROCEDURE — 99215 OFFICE O/P EST HI 40 MIN: CPT | Mod: S$GLB,,, | Performed by: STUDENT IN AN ORGANIZED HEALTH CARE EDUCATION/TRAINING PROGRAM

## 2022-11-08 PROCEDURE — 3078F PR MOST RECENT DIASTOLIC BLOOD PRESSURE < 80 MM HG: ICD-10-PCS | Mod: CPTII,S$GLB,, | Performed by: STUDENT IN AN ORGANIZED HEALTH CARE EDUCATION/TRAINING PROGRAM

## 2022-11-08 PROCEDURE — 1100F PR PT FALLS ASSESS DOC 2+ FALLS/FALL W/INJURY/YR: ICD-10-PCS | Mod: CPTII,S$GLB,, | Performed by: STUDENT IN AN ORGANIZED HEALTH CARE EDUCATION/TRAINING PROGRAM

## 2022-11-08 PROCEDURE — 99215 PR OFFICE/OUTPT VISIT, EST, LEVL V, 40-54 MIN: ICD-10-PCS | Mod: S$GLB,,, | Performed by: STUDENT IN AN ORGANIZED HEALTH CARE EDUCATION/TRAINING PROGRAM

## 2022-11-08 PROCEDURE — 3078F DIAST BP <80 MM HG: CPT | Mod: CPTII,S$GLB,, | Performed by: STUDENT IN AN ORGANIZED HEALTH CARE EDUCATION/TRAINING PROGRAM

## 2022-11-08 PROCEDURE — 99999 PR PBB SHADOW E&M-EST. PATIENT-LVL III: ICD-10-PCS | Mod: PBBFAC,,, | Performed by: STUDENT IN AN ORGANIZED HEALTH CARE EDUCATION/TRAINING PROGRAM

## 2022-11-08 PROCEDURE — 1123F PR ADV CARE PLAN DISCUSSED, PLAN OR SURROGATE DOCUMENTED: ICD-10-PCS | Mod: CPTII,S$GLB,, | Performed by: STUDENT IN AN ORGANIZED HEALTH CARE EDUCATION/TRAINING PROGRAM

## 2022-11-08 PROCEDURE — 3288F PR FALLS RISK ASSESSMENT DOCUMENTED: ICD-10-PCS | Mod: CPTII,S$GLB,, | Performed by: STUDENT IN AN ORGANIZED HEALTH CARE EDUCATION/TRAINING PROGRAM

## 2022-11-08 PROCEDURE — 3075F SYST BP GE 130 - 139MM HG: CPT | Mod: CPTII,S$GLB,, | Performed by: STUDENT IN AN ORGANIZED HEALTH CARE EDUCATION/TRAINING PROGRAM

## 2022-11-08 PROCEDURE — 1100F PTFALLS ASSESS-DOCD GE2>/YR: CPT | Mod: CPTII,S$GLB,, | Performed by: STUDENT IN AN ORGANIZED HEALTH CARE EDUCATION/TRAINING PROGRAM

## 2022-11-08 PROCEDURE — 1125F PR PAIN SEVERITY QUANTIFIED, PAIN PRESENT: ICD-10-PCS | Mod: CPTII,S$GLB,, | Performed by: STUDENT IN AN ORGANIZED HEALTH CARE EDUCATION/TRAINING PROGRAM

## 2022-11-08 PROCEDURE — 1125F AMNT PAIN NOTED PAIN PRSNT: CPT | Mod: CPTII,S$GLB,, | Performed by: STUDENT IN AN ORGANIZED HEALTH CARE EDUCATION/TRAINING PROGRAM

## 2022-11-08 PROCEDURE — 3075F PR MOST RECENT SYSTOLIC BLOOD PRESS GE 130-139MM HG: ICD-10-PCS | Mod: CPTII,S$GLB,, | Performed by: STUDENT IN AN ORGANIZED HEALTH CARE EDUCATION/TRAINING PROGRAM

## 2022-11-08 PROCEDURE — 3288F FALL RISK ASSESSMENT DOCD: CPT | Mod: CPTII,S$GLB,, | Performed by: STUDENT IN AN ORGANIZED HEALTH CARE EDUCATION/TRAINING PROGRAM

## 2022-11-08 PROCEDURE — 1123F ACP DISCUSS/DSCN MKR DOCD: CPT | Mod: CPTII,S$GLB,, | Performed by: STUDENT IN AN ORGANIZED HEALTH CARE EDUCATION/TRAINING PROGRAM

## 2022-11-08 RX ORDER — ACETAMINOPHEN 500 MG
1000 TABLET ORAL EVERY 6 HOURS PRN
Qty: 120 TABLET | Refills: 0 | Status: SHIPPED | OUTPATIENT
Start: 2022-11-08 | End: 2022-12-08

## 2022-11-08 RX ORDER — MELOXICAM 7.5 MG/1
7.5 TABLET ORAL DAILY
Qty: 30 TABLET | Refills: 0 | Status: SHIPPED | OUTPATIENT
Start: 2022-11-08 | End: 2022-12-08

## 2022-11-08 RX ORDER — HYDROCODONE BITARTRATE AND ACETAMINOPHEN 5; 325 MG/1; MG/1
1 TABLET ORAL EVERY 6 HOURS PRN
Qty: 120 TABLET | Refills: 0 | Status: SHIPPED | OUTPATIENT
Start: 2022-11-08 | End: 2022-12-08

## 2022-11-08 NOTE — PROGRESS NOTES
Subjective:       Patient ID: Joi Castellanos is a 77 y.o. male.    Chief Complaint: No chief complaint on file.    76 yo male with a PMH of DM II, HLD, Hip replacement 2019, noted pathologic R humerus fx 10/26/22 presents today with LLE edema for about a week. He notes the edema to be prior to the fx occurring. Prior to the noted fracture, he states he caught his weight on the R arm after slipping back slightly while on the toilet. He notes about 25 pounds of unintentional weight loss over the past 6 months. He notes R lumbar aching pain, constant for the past few days.     Pt reports baseline mood today, affect is constricted. He does not have an adjustable bed and is sleeping in a lift chair, states sleep maintenance is poor waking 2-3x nightly either to urinate or due to feeling restless. His main functional deficit is weakness in his arms R > L with difficulty pushing out of a chair as pt's dominant right shoulder is injured and in a soft cast. He is generally pain-free at rest but sometimes feels too stiff to move, particularly overnight, and states these aches are his worst symptom. Poorly localizing  to multiple large joints and not driven primarily by the identifiable bone mets on imaging. He is using a cane to ambulate at home for the past few months. Spends most of his waking hours in a chair and is eager to start working with home PT which is supposed to start today.     He denies HA, vision changes, dizziness, lightheadedness, CP, SOB, abdominal pain, n/v/d/f/c/c, numbness, tingling, recent travel, melena, hemoptysis, hematemesis.     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    Discussed with pt that at this time he is known to have a metastatic adenocarcinoma of unknown primary. Right lung origin appears most likely but not conclusively proven and this does make treatment more challenging. PDL-1 not checked on tissue samples to date. Advised pt his  disease is widespread and incurable but there are cancer directed treatments that can delay disease progression and improve his symptoms of weakness and weight loss. Conventional chemotherapy alone for presumed lung adenocarcinoma has lower survival and quality of life than palliative care alone however adjuvant immunotherapy will allow for better outcomes if it is available - recommend discussion with heme/onc at upcoming followup.    Completed an MPOA designating his spouse Cally and an LW indicating that in the event of terminal incapacity he would NOT accept long-term life support and would wish for comfort measures only without artificial fluids and nutrition. Otherwise pt has requested no limitations on care.    Per family request have ordered a hospital bed (to limit reliance on lift chair) and will step up pain regimen to include standing tylenol 1g bid and mobic 7.5mg daily while continuing Norco as 5/325mg q6h prn.    Review of Systems   Constitutional:  Positive for activity change, fatigue and unexpected weight change.   Respiratory:  Negative for chest tightness and shortness of breath.    Cardiovascular:  Negative for chest pain.   Gastrointestinal:  Negative for abdominal pain, constipation, nausea and vomiting.   Genitourinary:  Negative for difficulty urinating.   Musculoskeletal:  Positive for arthralgias.   Psychiatric/Behavioral:  Positive for sleep disturbance.        Objective:      Physical Exam  Constitutional:       General: He is not in acute distress.     Appearance: Normal appearance. He is normal weight. He is not ill-appearing.   HENT:      Head: Normocephalic and atraumatic.      Mouth/Throat:      Mouth: Mucous membranes are moist.      Pharynx: Oropharynx is clear.   Eyes:      Extraocular Movements: Extraocular movements intact.      Pupils: Pupils are equal, round, and reactive to light.   Cardiovascular:      Rate and Rhythm: Normal rate and regular rhythm.      Pulses: Normal  pulses.      Heart sounds: Normal heart sounds. No murmur heard.    No friction rub. No gallop.   Pulmonary:      Effort: Pulmonary effort is normal.      Breath sounds: Normal breath sounds. No wheezing or rales.      Comments: No chest wall or rib tenderness  Abdominal:      General: Abdomen is flat. There is no distension.      Palpations: Abdomen is soft.      Tenderness: There is no abdominal tenderness.   Musculoskeletal:         General: Tenderness and signs of injury (R shoulder in soft cast) present.   Skin:     General: Skin is warm and dry.      Coloration: Skin is pale.   Neurological:      General: No focal deficit present.      Mental Status: He is alert and oriented to person, place, and time.      Cranial Nerves: No cranial nerve deficit.      Sensory: No sensory deficit.      Motor: No weakness.      Coordination: Coordination normal.   Psychiatric:         Mood and Affect: Mood normal.         Behavior: Behavior normal.         Thought Content: Thought content normal.         Judgment: Judgment normal.        Review of Symptoms      Symptom Assessment (ESAS 0-10 Scale)  Pain:  0  Dyspnea:  0  Anxiety:  2  Nausea:  0  Depression:  0  Anorexia:  0  Fatigue:  5  Insomnia:  0  Restlessness:  3  Agitation:  0     CAM / Delirium:  Negative  Constipation:  Negative  Diarrhea:  Negative    Constipation:  No constipation    Bowel Management Plan (BMP):  Yes      Performance Status:  70    ECOG Performance Status thGthrthathdtheth:th th4th Living Arrangements:  Lives in home and Lives with family    Psychosocial/Cultural: Pt has been  for 50 years. Has 3 grown children and 5 grandchildren.  Retired from the [employer].      Spiritual:  F - Inge and Belief:  Yarsani  I - Importance:  High  C - Community:  [facility]  A - Address in Care:  No particular areas of impact     Time-Based Charting:  Yes  Chart Review: 10 minutes  Face to Face: 10 minutes  Symptom Assessment: 10 minutes  Coordination  of Care: 5 minutes  Discharge Plannin minutes  Advance Care Plannin minutes  Goals of Care: 8 minutes    Total Time Spent: 56 minutes      Advance Care Planning   Advance Directives:   Living Will: Yes        Copy on chart: Yes    Medical Power of : Yes    Agent's Name:  Cally Castellanos (spouse)   Agent's Contact Number:  [phone]    Decision Making:  Patient answered questions and Family answered questions  Goals of Care: What is most important right now is to focus on spending time at home, symptom/pain control, extending life as long as possible, even it it means sacrificing quality, curative/life-prolongation (regardless of treatment burdens). Accordingly, we have decided that the best plan to meet the patient's goals includes continuing with treatment.     Assessment:       1. Cancer associated pain    2. Acute deep vein thrombosis (DVT) of proximal vein of left lower extremity    3. Mass of right lung    4. Other closed displaced fracture of proximal end of right humerus with routine healing, subsequent encounter    5. Advanced care planning/counseling discussion          Plan:       Diagnoses and all orders for this visit:    Cancer associated pain  - c/w Norco 5/325mg q6h prn, seldom needed at this time  - start Mobic 7.5mg daily  - start Tylenol 1000mg bid  -     meloxicam (MOBIC) 7.5 MG tablet; Take 1 tablet (7.5 mg total) by mouth once daily. TAKE WITH FOOD.  -     HOSPITAL BED FOR HOME USE  -     HYDROcodone-acetaminophen (NORCO) 5-325 mg per tablet; Take 1 tablet by mouth every 6 (six) hours as needed for Pain. (Patient not taking: Reported on 2022)    Acute deep vein thrombosis (DVT) of proximal vein of left lower extremity  -     Ambulatory referral/consult to CLINIC Palliative Care    Mass of right lung  -     Ambulatory referral/consult to CLINIC Palliative Care  -     HYDROcodone-acetaminophen (NORCO) 5-325 mg per tablet; Take 1 tablet by mouth every 6 (six) hours as needed  for Pain. (Patient not taking: Reported on 11/14/2022)    Other closed displaced fracture of proximal end of right humerus with routine healing, subsequent encounter  -     Ambulatory referral/consult to CLINIC Palliative Care  -     HOSPITAL BED FOR HOME USE    Advanced care planning/counseling discussion  - spouse, Cally is MPOA  - LW created; no artificial fluids or feeds at end of life  - otherwise FULL CODE status pursuing cancer directed tx with no other care restrictions    Other orders  -     acetaminophen (TYLENOL) 500 MG tablet; Take 2 tablets (1,000 mg total) by mouth every 6 (six) hours as needed for Pain.          RTC in 6 weeks    Ronald Ho MD  Hospice and Palliative Medicine  Palliative Care Pager: [phone]    Advance Care Planning     Date: 11/14/2022    Living Will  During this visit, I engaged the patient and family  in the advance care planning process.  The patient and I reviewed the role for advance directives and their purpose in directing future healthcare if the patient's unable to speak for him/herself.  At this point in time, the patient does have full decision-making capacity.  We discussed different extreme health states that he could experience, and reviewed what kind of medical care he would want in those situations.  The patient communicated that if he were comatose and had little chance of a meaningful recovery, he would not want machines/life-sustaining treatments used. In addition to the above preference, other important end-of-life issues for the patient include none. The patient has completed a living will to reflect these preferences and The patient has already designated a healthcare power of  to make decisions on [unfilled] behalf.  I spent a total of 16 minutes engaging the patient in this advance care planning discussion.          Power of   I initiated the process of advance care planning today and explained the importance of this process to the  patient.  I introduced the concept of advance directives to the patient, as well. Then the patient received detailed information about the importance of designating a Health Care Power of  (HCPOA). He was also instructed to communicate with this person about their wishes for future healthcare, should he become sick and lose decision-making capacity. The patient has previously appointed a HCPOA. After our discussion, the patient has decided to complete a HCPOA and has appointed his significant other, health care agent: Cally Castellanos & health care agent number: [phone] I spent a total time of 16 minutes discussing this issue with the patient.         [facility]  I engaged the patient and family in a conversation about advance care planning and we specifically addressed what the goals of care would be moving forward, in light of the patient's change in clinical status, specifically metastatic adenocarcinoma.  We did not specifically address the patient's likely prognosis, which is fair .  We explored the patient's values and preferences for future care.  The patient and family endorses that what is most important right now is to focus on spending time at home, avoiding the hospital, remaining as independent as possible, symptom/pain control, and improvement in condition but with limits to invasive therapies    Accordingly, we have decided that the best plan to meet the patient's goals includes continuing with treatment    I did not explain the role for hospice care at this stage of the patient's illness, including its ability to help the patient live with the best quality of life possible.  We will not be making a hospice referral.    I spent a total of 16 minutes engaging the patient in this advance care planning discussion.

## 2022-11-09 DIAGNOSIS — C80.1 CANCER WITH UNKNOWN PRIMARY SITE: Primary | ICD-10-CM

## 2022-11-10 LAB
COMMENT: ABNORMAL
FINAL PATHOLOGIC DIAGNOSIS: ABNORMAL
Lab: ABNORMAL

## 2022-11-13 NOTE — PROGRESS NOTES
PATIENT: Joi Castellanos  MRN: [id number]  DATE: 11/14/2022    Diagnosis:   1. Non-small cell cancer of right lung    2. Malignant pleural effusion    3. Secondary malignant neoplasm of bone    4. Chronic neoplasm-related pain    5. Mild protein-calorie malnutrition    6. Chemotherapy-induced nausea and vomiting    7. Advance care planning      Chief Complaint: Lung Cancer    Oncologic History:      Oncologic History Cancer of unknown primary, likely adenocarcinoma of right lung      Oncologic Treatment Planned therapy: carboplatin/pemetrexed/pembrolizumab      Pathology 11/2/22:  1. BIOPSY OF RIGHT HUMERUS:   METASTATIC CARCINOMA WITH ASSOCIATED FIBROUS TISSUE WITH CHRONIC AND ACUTE INFLAMMATION   2. BIOPSY OF RIGHT ILIAC BONE:   METASTATIC ADENOCARCINOMA    11/2/22:  Cytology:  Positive for malignancy   The cytomorphologic features and immunohistochemical staining are consistent   with adenocarcinoma   Special stain for fungal elements (GMS) is negative with appropriate controls   Insufficient tumor is present for molecular studies   Note:  Patient's recent right humerus and right iliac bone biopsies showing   metastatic adenocarcinoma are noted (KES-).   Tumor cells from this biopsy show strong positivity with CK7.  Tumor cells   are negative for TTF1, P40, GATA3, and CDX2.   As per imaging, patient with multiple right lower lobe lung masses with   mediastinal and hilar adenopathy.   These findings may represent either a primary pulmonary adenocarcinoma (with   TTF1 negativity) or metastatic process.         Subjective:    History of Present Illness: Mr. Castellanos is a 77 y.o. male who presents for evaluation and management of non-small cell adenocarcinoma of right lung. I had seen him during a hospitalization in October/November 2022.    - he was admitted on 10/31/22 for deep vein thrombosis of right femoral vein. He had had a pathologic fracture of right humerus prior to admission. Imaging revealed a right  lung mass/pleural effusion.  - he underwent thoracentesis and biopsy of bone lesion on 11/2/22. Pathology revealed adenocarcinoma, felt to be of right lung origin given imaging.    Interval history:  - he presents for a follow-up appointment for his non-small cell lung cancer of right lung  - today, he endorses fatigue, weight loss, arm pain, back pain, weakness, dyspnea upon exertion.      Past medical, surgical, family, and social histories have been reviewed and updated below.    Past Medical History:   Past Medical History:   Diagnosis Date    Arthritis     Diabetes mellitus, type II     fasting bg criteria    Hyperlipidemia        Past Surgical History:   Past Surgical History:   Procedure Laterality Date    TONSILLECTOMY         Family History:   Family History   Problem Relation Age of Onset    Cancer Mother         unknown, met to spine    Hypertension Mother     Osteoarthritis Mother     Deep vein thrombosis Father     Heart attack Father     Pulmonary embolism Father     Emphysema Father     Arthritis Sister     No Known Problems Daughter     No Known Problems Son     Fibromyalgia Sister     Breast cancer Maternal Aunt     Heart disease Neg Hx     Prostate cancer Neg Hx     Colon cancer Neg Hx     Diabetes Neg Hx        Social History:  reports that he has never smoked. He has never used smokeless tobacco. He reports current alcohol use. He reports that he does not use drugs.    Allergies:  Review of patient's allergies indicates:   Allergen Reactions    Asa [aspirin]      Patient states he doesn't have allergy to medication       Medications:  Current Outpatient Medications   Medication Sig Dispense Refill    acetaminophen (TYLENOL) 500 MG tablet Take 500 mg by mouth every 6 (six) hours as needed for Pain.      acetaminophen (TYLENOL) 500 MG tablet Take 2 tablets (1,000 mg total) by mouth every 6 (six) hours as needed for Pain. 120 tablet 0    apixaban (ELIQUIS) 5 mg Tab Take 2 tablets (10 mg total) by  mouth 2 (two) times daily. for 7 days, then 1 tablet (5mg) 2 times daily thereafter 70 tablet 0    apixaban (ELIQUIS) 5 mg Tab Take 1 tablet (5 mg total) by mouth 2 (two) times daily. 60 tablet 2    co-enzyme Q-10 30 mg capsule Take 30 mg by mouth once daily.      fluticasone propionate (FLONASE) 50 mcg/actuation nasal spray 2 sprays (100 mcg total) by Each Nostril route once daily. 16 g 11    HYDROcodone-acetaminophen (NORCO) 5-325 mg per tablet Take 1 tablet by mouth every 6 (six) hours as needed for Pain. 120 tablet 0    hydrOXYzine pamoate (VISTARIL) 25 MG Cap Take 1 capsule (25 mg total) by mouth every 8 (eight) hours as needed (anxiety). 21 capsule 0    losartan (COZAAR) 100 MG tablet TAKE 1 TABLET BY MOUTH EVERY DAY 90 tablet 3    melatonin (MELATIN) 3 mg tablet Take 2 tablets (6 mg total) by mouth nightly as needed for Insomnia. 30 tablet 0    meloxicam (MOBIC) 7.5 MG tablet Take 1 tablet (7.5 mg total) by mouth once daily. TAKE WITH FOOD. 30 tablet 0    oxyCODONE-acetaminophen (PERCOCET) 5-325 mg per tablet Take 1 tablet by mouth every 6 (six) hours as needed for Pain. 12 tablet 0    rosuvastatin (CRESTOR) 20 MG tablet TAKE 1 TABLET BY MOUTH EVERY DAY 90 tablet 3    vitamin D (VITAMIN D3) 1000 units Tab Take 1,000 Units by mouth once daily.      zinc gluconate 50 mg tablet Take 50 mg by mouth once daily. PRN       No current facility-administered medications for this visit.       Review of Systems   Constitutional:  Positive for fatigue and unexpected weight change.   HENT:  Negative for sore throat.    Eyes:  Negative for visual disturbance.   Respiratory:  Positive for shortness of breath.    Cardiovascular:  Negative for chest pain.   Gastrointestinal:  Negative for abdominal pain.   Genitourinary:  Negative for dysuria.   Musculoskeletal:  Positive for back pain.   Skin:  Negative for rash.   Neurological:  Positive for weakness. Negative for headaches.   Hematological:  Negative for adenopathy.  "  Psychiatric/Behavioral:  The patient is not nervous/anxious.      ECOG Performance Status:   ECOG SCORE 1            Objective:      Vitals:   Vitals:    11/14/22 0941   BP: (!) 118/59   BP Location: Left arm   Patient Position: Sitting   BP Method: Medium (Automatic)   Pulse: 95   Resp: 20   Temp: 98 °F (36.7 °C)   TempSrc: Oral   SpO2: 96%   Height: 6' 2" (1.88 m)     BMI: Body mass index is 21.82 kg/m².    Physical Exam  Vitals and nursing note reviewed.   Constitutional:       Appearance: He is well-developed.      Comments: Weak, fatigued, malnourished.   HENT:      Head: Normocephalic and atraumatic.   Eyes:      Pupils: Pupils are equal, round, and reactive to light.   Cardiovascular:      Rate and Rhythm: Regular rhythm. Tachycardia present.   Pulmonary:      Effort: Pulmonary effort is normal.      Breath sounds: Normal breath sounds.   Abdominal:      General: Bowel sounds are normal.      Palpations: Abdomen is soft.   Musculoskeletal:         General: Normal range of motion.      Cervical back: Normal range of motion and neck supple.   Skin:     General: Skin is warm and dry.   Neurological:      Mental Status: He is alert and oriented to person, place, and time.   Psychiatric:         Behavior: Behavior normal.         Thought Content: Thought content normal.         Judgment: Judgment normal.       Laboratory Data:  Labs have been reviewed.    Lab Results   Component Value Date    WBC 12.49 11/02/2022    HGB 7.5 (L) 11/02/2022    HCT 22.9 (L) 11/02/2022     (H) 11/02/2022    PLT 92 (L) 11/02/2022           Imaging:    Bone scan (11/2/22):  Scintigraphic evidence of diffuse osteoblastic metastatic disease throughout the axial and proximal appendicular skeleton.     Increased uptake within the right proximal humerus correlating with the fracture identified on prior CT.      MRI brain (11/1/22):  5 mm right postcentral gyrus focus of abnormal signal which may reflect acute infarct versus " metastatic lesion although the lack of enhancement is atypical for metastasis.  Correlate clinically with symptomatology.     Scattered tiny supratentorial white matter enhancing lesions that may represent very early metastatic disease.    CT chest/abdomen/pelvis (10/31/22): I have personally reviewed the images  measuring 6 by 3 cm in the right lower lobe.  There is right lower lobe consolidation and compressive atelectasis limiting evaluation.  Left lung appears clear.     There is a right hilar mass or metastatic adenopathy measuring 3.6 cm.  There is right paratracheal 2 cm lymph node as well as precarinal 1.8 cm lymph node.  Matted adenopathy in the subcarinal region as well as AP window and left hilum.     The heart is not enlarged.  No pericardial effusion.     Abdomen pelvis: Innumerable granulomas are noted in the liver and the spleen.  No focal lesions are appreciated.     Gallbladder is unremarkable.     Pancreas, adrenal glands and kidneys appear normal.     Bowel is unremarkable.  Bladder appears normal.  Prostate is enlarged.     The imaged cervical, thoracic and lumbosacral spine as well as the is the scapula bilaterally, sternum,  pelvic bones and ribs demonstrated innumerable metastatic lesions with target lesions, sclerotic foci as well as a L1 compression fracture.  No subluxation.     Impression:     Mediastinal and hilar adenopathy with right hilar mass as well as 6 cm the 3.6 cm right lower lobe mass as described suspicious for metastatic lesions.  Correlate for primary malignancy.     Right mild to moderate pleural effusion.     No focal visceral abdominal lesions .     Innumerable metastatic lesions throughout the skeletal system.    Ultrasound lower extremity (10/31/22):  Right thigh veins: Right common femoral vein is patent.  Nonocclusive thrombus identified in the right femoral vein.  Otherwise, the imaged veins of the right lower extremity are patent.     Right calf veins: The visualized  "calf veins are patent.     Left thigh veins: Partially occlusive thrombus in the left common femoral vein.  Occlusive thrombosis throughout the left lower extremity including the femoral, greater saphenous, and popliteal veins.     Left calf veins: Occlusive thrombosis involving the posterior tibial, anterior tibial, and peroneal veins.     Miscellaneous: Left iliac vein appears patent.     Impression:     Extensive left lower extremity deep vein thrombosis and partially occlusive thrombus in the right femoral vein.              Assessment:       1. Non-small cell cancer of right lung    2. Malignant pleural effusion    3. Secondary malignant neoplasm of bone    4. Chronic neoplasm-related pain    5. Mild protein-calorie malnutrition    6. Chemotherapy-induced nausea and vomiting    7. Thrombocytopenia    8. Anemia in neoplastic disease    9. Acute deep vein thrombosis (DVT) of right femoral vein    10. Advance care planning           Plan:     Non-small cell lung cancer of right lung - stage IV  - I have reviewed his chart  - he was admitted on 10/31/22 for deep vein thrombosis of right femoral vein. He had had a pathologic fracture of right humerus prior to admission. Imaging revealed a right lung mass/pleural effusion.  - he underwent thoracentesis and biopsy of bone lesion on 11/2/22. Pathology revealed adenocarcinoma, felt to be of right lung origin given imaging.  - MRI brain (11/1/22) revealed"scattered tiny supratentorial white matter enhancing lesions that may represent very early metastatic disease."  - send Tempus liquid biopsy.  - unable to check PD-L1 on bone biopsy  - I discussed systemic therapy for lung cancer. I and [facility] recommend carboplatin/pemetrexed. If molecular testing comes back with an actionable mutation, we will give targeted therapy. If testing is negative, I will add pembrolizumab to carboplatin/pemetrexed.  - refer to interventional radiology for port placement. Will give " lovenox for perioperative anticoagulation since will need to hold oral anticoagulation.  - send folic acid.  - return to clinic in 3 weeks in preparation for cycle #1 of carboplatin/pemetrexed/pembrolizumab.    2. Chemotherapy-induced nausea/vomiting  - sent zofran and dexamethasone    3. Chronic neoplasm-related pain / fracture of right humerus  - I sent oxycodone 10mg tablets  - he is scheduled for surgery with Dr. Merino on 11/26/22.    4. Mild protein malnutrition  - weight loss noted. Continue to monitor    5. Thrombocytopenia / anemia in neoplastic disease  - likely secondary to advanced cancer, perhaps myelophthistic process from bone/bone marrow involvement  - monitor closely    6. Acute deep vein thrombosis of right femoral vein.  - on eliquis  - refer to interventional radiology for port placement. Will give lovenox for perioperative anticoagulation since will need to hold oral anticoagulation.  - continue to monitor    7. Advance Care Planning   - completed previously.         - return to clinic in 3 weeks in preparation for cycle #1 of carboplatin/pemetrexed/pembrolizumab.    Rj Wilson M.D.  Hematology/Oncology  [facility] - [address], [address]  [place] [address]  Phone: [phone]  Fax: [phone]

## 2022-11-14 ENCOUNTER — OFFICE VISIT (OUTPATIENT)
Dept: HEMATOLOGY/ONCOLOGY | Facility: CLINIC | Age: 77
End: 2022-11-14
Payer: MEDICARE

## 2022-11-14 VITALS
OXYGEN SATURATION: 96 % | TEMPERATURE: 98 F | HEIGHT: 74 IN | HEART RATE: 95 BPM | RESPIRATION RATE: 20 BRPM | BODY MASS INDEX: 21.82 KG/M2 | DIASTOLIC BLOOD PRESSURE: 59 MMHG | SYSTOLIC BLOOD PRESSURE: 118 MMHG

## 2022-11-14 DIAGNOSIS — C34.91 NON-SMALL CELL CANCER OF RIGHT LUNG: Primary | ICD-10-CM

## 2022-11-14 DIAGNOSIS — M84.421A PATHOLOGICAL FRACTURE OF RIGHT HUMERUS, UNSPECIFIED PATHOLOGICAL CAUSE, INITIAL ENCOUNTER: ICD-10-CM

## 2022-11-14 DIAGNOSIS — D63.0 ANEMIA IN NEOPLASTIC DISEASE: ICD-10-CM

## 2022-11-14 DIAGNOSIS — M89.9 BONE LESION: ICD-10-CM

## 2022-11-14 DIAGNOSIS — D50.9 IRON DEFICIENCY ANEMIA, UNSPECIFIED IRON DEFICIENCY ANEMIA TYPE: ICD-10-CM

## 2022-11-14 DIAGNOSIS — R91.8 MASS OF RIGHT LUNG: ICD-10-CM

## 2022-11-14 DIAGNOSIS — T45.1X5A CHEMOTHERAPY-INDUCED NAUSEA AND VOMITING: ICD-10-CM

## 2022-11-14 DIAGNOSIS — D69.6 THROMBOCYTOPENIA: ICD-10-CM

## 2022-11-14 DIAGNOSIS — J91.0 MALIGNANT PLEURAL EFFUSION: ICD-10-CM

## 2022-11-14 DIAGNOSIS — E03.2 DRUG-INDUCED HYPOTHYROIDISM: ICD-10-CM

## 2022-11-14 DIAGNOSIS — G89.3 CHRONIC NEOPLASM-RELATED PAIN: ICD-10-CM

## 2022-11-14 DIAGNOSIS — C79.51 SECONDARY MALIGNANT NEOPLASM OF BONE: ICD-10-CM

## 2022-11-14 DIAGNOSIS — E44.1 MILD PROTEIN-CALORIE MALNUTRITION: ICD-10-CM

## 2022-11-14 DIAGNOSIS — R11.2 CHEMOTHERAPY-INDUCED NAUSEA AND VOMITING: ICD-10-CM

## 2022-11-14 DIAGNOSIS — I82.411 ACUTE DEEP VEIN THROMBOSIS (DVT) OF RIGHT FEMORAL VEIN: ICD-10-CM

## 2022-11-14 PROCEDURE — 3078F DIAST BP <80 MM HG: CPT | Mod: CPTII,S$GLB,, | Performed by: INTERNAL MEDICINE

## 2022-11-14 PROCEDURE — 1126F PR PAIN SEVERITY QUANTIFIED, NO PAIN PRESENT: ICD-10-PCS | Mod: CPTII,S$GLB,, | Performed by: INTERNAL MEDICINE

## 2022-11-14 PROCEDURE — 1159F PR MEDICATION LIST DOCUMENTED IN MEDICAL RECORD: ICD-10-PCS | Mod: CPTII,S$GLB,, | Performed by: INTERNAL MEDICINE

## 2022-11-14 PROCEDURE — 3074F SYST BP LT 130 MM HG: CPT | Mod: CPTII,S$GLB,, | Performed by: INTERNAL MEDICINE

## 2022-11-14 PROCEDURE — 1100F PTFALLS ASSESS-DOCD GE2>/YR: CPT | Mod: CPTII,S$GLB,, | Performed by: INTERNAL MEDICINE

## 2022-11-14 PROCEDURE — 3074F PR MOST RECENT SYSTOLIC BLOOD PRESSURE < 130 MM HG: ICD-10-PCS | Mod: CPTII,S$GLB,, | Performed by: INTERNAL MEDICINE

## 2022-11-14 PROCEDURE — 99215 OFFICE O/P EST HI 40 MIN: CPT | Mod: S$GLB,,, | Performed by: INTERNAL MEDICINE

## 2022-11-14 PROCEDURE — 3288F PR FALLS RISK ASSESSMENT DOCUMENTED: ICD-10-PCS | Mod: CPTII,S$GLB,, | Performed by: INTERNAL MEDICINE

## 2022-11-14 PROCEDURE — 99499 UNLISTED E&M SERVICE: CPT | Mod: S$GLB,,, | Performed by: INTERNAL MEDICINE

## 2022-11-14 PROCEDURE — 99499 RISK ADDL DX/OHS AUDIT: ICD-10-PCS | Mod: S$GLB,,, | Performed by: INTERNAL MEDICINE

## 2022-11-14 PROCEDURE — 3288F FALL RISK ASSESSMENT DOCD: CPT | Mod: CPTII,S$GLB,, | Performed by: INTERNAL MEDICINE

## 2022-11-14 PROCEDURE — 1126F AMNT PAIN NOTED NONE PRSNT: CPT | Mod: CPTII,S$GLB,, | Performed by: INTERNAL MEDICINE

## 2022-11-14 PROCEDURE — 1160F PR REVIEW ALL MEDS BY PRESCRIBER/CLIN PHARMACIST DOCUMENTED: ICD-10-PCS | Mod: CPTII,S$GLB,, | Performed by: INTERNAL MEDICINE

## 2022-11-14 PROCEDURE — 99215 PR OFFICE/OUTPT VISIT, EST, LEVL V, 40-54 MIN: ICD-10-PCS | Mod: S$GLB,,, | Performed by: INTERNAL MEDICINE

## 2022-11-14 PROCEDURE — 1159F MED LIST DOCD IN RCRD: CPT | Mod: CPTII,S$GLB,, | Performed by: INTERNAL MEDICINE

## 2022-11-14 PROCEDURE — 3078F PR MOST RECENT DIASTOLIC BLOOD PRESSURE < 80 MM HG: ICD-10-PCS | Mod: CPTII,S$GLB,, | Performed by: INTERNAL MEDICINE

## 2022-11-14 PROCEDURE — 1123F ACP DISCUSS/DSCN MKR DOCD: CPT | Mod: CPTII,S$GLB,, | Performed by: INTERNAL MEDICINE

## 2022-11-14 PROCEDURE — 1123F PR ADV CARE PLAN DISCUSSED, PLAN OR SURROGATE DOCUMENTED: ICD-10-PCS | Mod: CPTII,S$GLB,, | Performed by: INTERNAL MEDICINE

## 2022-11-14 PROCEDURE — 1160F RVW MEDS BY RX/DR IN RCRD: CPT | Mod: CPTII,S$GLB,, | Performed by: INTERNAL MEDICINE

## 2022-11-14 PROCEDURE — 99999 PR PBB SHADOW E&M-EST. PATIENT-LVL V: CPT | Mod: PBBFAC,,, | Performed by: INTERNAL MEDICINE

## 2022-11-14 PROCEDURE — 1100F PR PT FALLS ASSESS DOC 2+ FALLS/FALL W/INJURY/YR: ICD-10-PCS | Mod: CPTII,S$GLB,, | Performed by: INTERNAL MEDICINE

## 2022-11-14 PROCEDURE — 99999 PR PBB SHADOW E&M-EST. PATIENT-LVL V: ICD-10-PCS | Mod: PBBFAC,,, | Performed by: INTERNAL MEDICINE

## 2022-11-14 RX ORDER — OXYCODONE HYDROCHLORIDE 10 MG/1
10 TABLET ORAL EVERY 6 HOURS PRN
Qty: 120 TABLET | Refills: 0 | Status: SHIPPED | OUTPATIENT
Start: 2022-11-14

## 2022-11-14 RX ORDER — ENOXAPARIN SODIUM 100 MG/ML
60 INJECTION SUBCUTANEOUS SEE ADMIN INSTRUCTIONS
Qty: 4 EACH | Refills: 0 | Status: ON HOLD | OUTPATIENT
Start: 2022-11-14 | End: 2022-11-18 | Stop reason: HOSPADM

## 2022-11-14 RX ORDER — FOLIC ACID 1 MG/1
1 TABLET ORAL DAILY
Qty: 100 TABLET | Refills: 3 | Status: SHIPPED | OUTPATIENT
Start: 2022-11-14 | End: 2023-11-14

## 2022-11-15 ENCOUNTER — HOSPITAL ENCOUNTER (INPATIENT)
Facility: HOSPITAL | Age: 77
LOS: 2 days | Discharge: HOSPICE | DRG: 065 | End: 2022-11-18
Attending: EMERGENCY MEDICINE | Admitting: INTERNAL MEDICINE
Payer: MEDICARE

## 2022-11-15 DIAGNOSIS — I63.9 ISCHEMIC CEREBROVASCULAR ACCIDENT (CVA): ICD-10-CM

## 2022-11-15 DIAGNOSIS — R53.83 FATIGUE: ICD-10-CM

## 2022-11-15 DIAGNOSIS — D63.8 ANEMIA OF CHRONIC DISEASE: Primary | ICD-10-CM

## 2022-11-15 DIAGNOSIS — J91.0 MALIGNANT PLEURAL EFFUSION: ICD-10-CM

## 2022-11-15 DIAGNOSIS — R79.89 ELEVATED TROPONIN: ICD-10-CM

## 2022-11-15 DIAGNOSIS — I63.89 ACUTE ISCHEMIC MULTIFOCAL MULTIPLE VASCULAR TERRITORIES STROKE: ICD-10-CM

## 2022-11-15 DIAGNOSIS — C34.90 ADENOCARCINOMA OF LUNG, STAGE 4, UNSPECIFIED LATERALITY: ICD-10-CM

## 2022-11-15 DIAGNOSIS — R07.9 CHEST PAIN: ICD-10-CM

## 2022-11-15 DIAGNOSIS — D64.9 ANEMIA: ICD-10-CM

## 2022-11-15 PROBLEM — D72.829 LEUKOCYTOSIS: Status: ACTIVE | Noted: 2022-11-15

## 2022-11-15 PROBLEM — G93.40 ENCEPHALOPATHY: Status: ACTIVE | Noted: 2022-11-15

## 2022-11-15 LAB
ABO + RH BLD: NORMAL
ALBUMIN SERPL BCP-MCNC: 2.5 G/DL (ref 3.5–5.2)
ALLENS TEST: ABNORMAL
ALLENS TEST: NORMAL
ALP SERPL-CCNC: 546 U/L (ref 55–135)
ALT SERPL W/O P-5'-P-CCNC: 20 U/L (ref 10–44)
ANION GAP SERPL CALC-SCNC: 13 MMOL/L (ref 8–16)
ANISOCYTOSIS BLD QL SMEAR: SLIGHT
AST SERPL-CCNC: 27 U/L (ref 10–40)
BASOPHILS NFR BLD: 0 % (ref 0–1.9)
BILIRUB SERPL-MCNC: 0.7 MG/DL (ref 0.1–1)
BLD GP AB SCN CELLS X3 SERPL QL: NORMAL
BLD PROD TYP BPU: NORMAL
BLD PROD TYP BPU: NORMAL
BLOOD UNIT EXPIRATION DATE: NORMAL
BLOOD UNIT EXPIRATION DATE: NORMAL
BLOOD UNIT TYPE CODE: 5100
BLOOD UNIT TYPE CODE: 5100
BLOOD UNIT TYPE: NORMAL
BLOOD UNIT TYPE: NORMAL
BUN SERPL-MCNC: 19 MG/DL (ref 8–23)
CALCIUM SERPL-MCNC: 8.7 MG/DL (ref 8.7–10.5)
CHLORIDE SERPL-SCNC: 102 MMOL/L (ref 95–110)
CHOLEST SERPL-MCNC: 110 MG/DL (ref 120–199)
CHOLEST/HDLC SERPL: 5.2 {RATIO} (ref 2–5)
CO2 SERPL-SCNC: 20 MMOL/L (ref 23–29)
CODING SYSTEM: NORMAL
CODING SYSTEM: NORMAL
CREAT SERPL-MCNC: 0.6 MG/DL (ref 0.5–1.4)
CREAT SERPL-MCNC: 0.7 MG/DL (ref 0.5–1.4)
DELSYS: ABNORMAL
DELSYS: NORMAL
DIFFERENTIAL METHOD: ABNORMAL
DISPENSE STATUS: NORMAL
DISPENSE STATUS: NORMAL
EOSINOPHIL NFR BLD: 0 % (ref 0–8)
ERYTHROCYTE [DISTWIDTH] IN BLOOD BY AUTOMATED COUNT: 17.6 % (ref 11.5–14.5)
EST. GFR  (NO RACE VARIABLE): >60 ML/MIN/1.73 M^2
ESTIMATED AVG GLUCOSE: 117 MG/DL (ref 68–131)
FERRITIN SERPL-MCNC: 5453 NG/ML (ref 20–300)
GLUCOSE SERPL-MCNC: 153 MG/DL (ref 70–110)
HBA1C MFR BLD: 5.7 % (ref 4–5.6)
HCT VFR BLD AUTO: 19.6 % (ref 40–54)
HCT VFR BLD AUTO: 21 % (ref 40–54)
HDLC SERPL-MCNC: 21 MG/DL (ref 40–75)
HDLC SERPL: 19.1 % (ref 20–50)
HGB BLD-MCNC: 6.1 G/DL (ref 14–18)
HGB BLD-MCNC: 6.8 G/DL (ref 14–18)
HYPOCHROMIA BLD QL SMEAR: ABNORMAL
IMM GRANULOCYTES # BLD AUTO: ABNORMAL K/UL (ref 0–0.04)
IMM GRANULOCYTES NFR BLD AUTO: ABNORMAL % (ref 0–0.5)
INR PPP: 1.3 (ref 0.8–1.2)
LDLC SERPL CALC-MCNC: 57 MG/DL (ref 63–159)
LYMPHOCYTES NFR BLD: 3 % (ref 18–48)
MAGNESIUM SERPL-MCNC: 2.1 MG/DL (ref 1.6–2.6)
MCH RBC QN AUTO: 33 PG (ref 27–31)
MCHC RBC AUTO-ENTMCNC: 32.4 G/DL (ref 32–36)
MCV RBC AUTO: 102 FL (ref 82–98)
MODE: ABNORMAL
MODE: NORMAL
MONOCYTES NFR BLD: 2 % (ref 4–15)
NEUTROPHILS NFR BLD: 95 % (ref 38–73)
NONHDLC SERPL-MCNC: 89 MG/DL
NRBC BLD-RTO: 1 /100 WBC
NUM UNITS TRANS PACKED RBC: NORMAL
NUM UNITS TRANS PACKED RBC: NORMAL
PLATELET # BLD AUTO: 77 K/UL (ref 150–450)
PLATELET BLD QL SMEAR: ABNORMAL
PMV BLD AUTO: 10.2 FL (ref 9.2–12.9)
POC PTINR: 1.6 (ref 0.9–1.2)
POC PTWBT: 19.3 SEC (ref 9.7–14.3)
POCT GLUCOSE: 150 MG/DL (ref 70–110)
POCT GLUCOSE: 173 MG/DL (ref 70–110)
POCT GLUCOSE: 194 MG/DL (ref 70–110)
POIKILOCYTOSIS BLD QL SMEAR: ABNORMAL
POLYCHROMASIA BLD QL SMEAR: ABNORMAL
POTASSIUM SERPL-SCNC: 3.9 MMOL/L (ref 3.5–5.1)
PROT SERPL-MCNC: 6.6 G/DL (ref 6–8.4)
PROTHROMBIN TIME: 13.4 SEC (ref 9–12.5)
RBC # BLD AUTO: 2.06 M/UL (ref 4.6–6.2)
RETICS/RBC NFR AUTO: 4.3 % (ref 0.4–2)
SAMPLE: ABNORMAL
SAMPLE: NORMAL
SITE: ABNORMAL
SITE: NORMAL
SODIUM SERPL-SCNC: 135 MMOL/L (ref 136–145)
TRIGL SERPL-MCNC: 160 MG/DL (ref 30–150)
TROPONIN I SERPL DL<=0.01 NG/ML-MCNC: 0.42 NG/ML (ref 0–0.03)
TROPONIN I SERPL DL<=0.01 NG/ML-MCNC: 0.61 NG/ML (ref 0–0.03)
TSH SERPL DL<=0.005 MIU/L-ACNC: 1.96 UIU/ML (ref 0.4–4)
WBC # BLD AUTO: 18.43 K/UL (ref 3.9–12.7)

## 2022-11-15 PROCEDURE — 85610 PROTHROMBIN TIME: CPT

## 2022-11-15 PROCEDURE — 83036 HEMOGLOBIN GLYCOSYLATED A1C: CPT | Performed by: NURSE PRACTITIONER

## 2022-11-15 PROCEDURE — 80061 LIPID PANEL: CPT | Performed by: EMERGENCY MEDICINE

## 2022-11-15 PROCEDURE — 85018 HEMOGLOBIN: CPT | Performed by: NURSE PRACTITIONER

## 2022-11-15 PROCEDURE — 85027 COMPLETE CBC AUTOMATED: CPT | Performed by: EMERGENCY MEDICINE

## 2022-11-15 PROCEDURE — 93005 ELECTROCARDIOGRAM TRACING: CPT

## 2022-11-15 PROCEDURE — 25500020 PHARM REV CODE 255: Performed by: INTERNAL MEDICINE

## 2022-11-15 PROCEDURE — 96367 TX/PROPH/DG ADDL SEQ IV INF: CPT

## 2022-11-15 PROCEDURE — 96361 HYDRATE IV INFUSION ADD-ON: CPT

## 2022-11-15 PROCEDURE — 84484 ASSAY OF TROPONIN QUANT: CPT | Mod: 91 | Performed by: NURSE PRACTITIONER

## 2022-11-15 PROCEDURE — 85007 BL SMEAR W/DIFF WBC COUNT: CPT | Mod: 91 | Performed by: INTERNAL MEDICINE

## 2022-11-15 PROCEDURE — G0378 HOSPITAL OBSERVATION PER HR: HCPCS

## 2022-11-15 PROCEDURE — 85007 BL SMEAR W/DIFF WBC COUNT: CPT | Performed by: EMERGENCY MEDICINE

## 2022-11-15 PROCEDURE — 93010 ELECTROCARDIOGRAM REPORT: CPT | Mod: ,,, | Performed by: INTERNAL MEDICINE

## 2022-11-15 PROCEDURE — 82728 ASSAY OF FERRITIN: CPT | Performed by: STUDENT IN AN ORGANIZED HEALTH CARE EDUCATION/TRAINING PROGRAM

## 2022-11-15 PROCEDURE — 96375 TX/PRO/DX INJ NEW DRUG ADDON: CPT

## 2022-11-15 PROCEDURE — 85027 COMPLETE CBC AUTOMATED: CPT | Mod: 91 | Performed by: INTERNAL MEDICINE

## 2022-11-15 PROCEDURE — 93010 EKG 12-LEAD: ICD-10-PCS | Mod: ,,, | Performed by: INTERNAL MEDICINE

## 2022-11-15 PROCEDURE — P9016 RBC LEUKOCYTES REDUCED: HCPCS | Performed by: NURSE PRACTITIONER

## 2022-11-15 PROCEDURE — 99900035 HC TECH TIME PER 15 MIN (STAT)

## 2022-11-15 PROCEDURE — 85045 AUTOMATED RETICULOCYTE COUNT: CPT | Performed by: STUDENT IN AN ORGANIZED HEALTH CARE EDUCATION/TRAINING PROGRAM

## 2022-11-15 PROCEDURE — 86920 COMPATIBILITY TEST SPIN: CPT | Performed by: NURSE PRACTITIONER

## 2022-11-15 PROCEDURE — 85610 PROTHROMBIN TIME: CPT | Performed by: EMERGENCY MEDICINE

## 2022-11-15 PROCEDURE — 84443 ASSAY THYROID STIM HORMONE: CPT | Performed by: EMERGENCY MEDICINE

## 2022-11-15 PROCEDURE — 82565 ASSAY OF CREATININE: CPT

## 2022-11-15 PROCEDURE — 86901 BLOOD TYPING SEROLOGIC RH(D): CPT | Performed by: EMERGENCY MEDICINE

## 2022-11-15 PROCEDURE — 63600175 PHARM REV CODE 636 W HCPCS: Performed by: EMERGENCY MEDICINE

## 2022-11-15 PROCEDURE — 96365 THER/PROPH/DIAG IV INF INIT: CPT

## 2022-11-15 PROCEDURE — P9016 RBC LEUKOCYTES REDUCED: HCPCS | Performed by: EMERGENCY MEDICINE

## 2022-11-15 PROCEDURE — 83735 ASSAY OF MAGNESIUM: CPT | Performed by: EMERGENCY MEDICINE

## 2022-11-15 PROCEDURE — 25000003 PHARM REV CODE 250: Performed by: EMERGENCY MEDICINE

## 2022-11-15 PROCEDURE — 82962 GLUCOSE BLOOD TEST: CPT

## 2022-11-15 PROCEDURE — 80053 COMPREHEN METABOLIC PANEL: CPT | Performed by: EMERGENCY MEDICINE

## 2022-11-15 PROCEDURE — 85014 HEMATOCRIT: CPT | Performed by: NURSE PRACTITIONER

## 2022-11-15 PROCEDURE — 86920 COMPATIBILITY TEST SPIN: CPT | Performed by: EMERGENCY MEDICINE

## 2022-11-15 PROCEDURE — 84466 ASSAY OF TRANSFERRIN: CPT | Performed by: NURSE PRACTITIONER

## 2022-11-15 PROCEDURE — G0425 PR INPT TELEHEALTH CONSULT 30M: ICD-10-PCS | Mod: 95,,, | Performed by: STUDENT IN AN ORGANIZED HEALTH CARE EDUCATION/TRAINING PROGRAM

## 2022-11-15 PROCEDURE — 36415 COLL VENOUS BLD VENIPUNCTURE: CPT | Performed by: INTERNAL MEDICINE

## 2022-11-15 PROCEDURE — 99285 EMERGENCY DEPT VISIT HI MDM: CPT | Mod: 25

## 2022-11-15 PROCEDURE — 84484 ASSAY OF TROPONIN QUANT: CPT | Performed by: EMERGENCY MEDICINE

## 2022-11-15 PROCEDURE — 63600175 PHARM REV CODE 636 W HCPCS: Performed by: INTERNAL MEDICINE

## 2022-11-15 PROCEDURE — G0425 INPT/ED TELECONSULT30: HCPCS | Mod: 95,,, | Performed by: STUDENT IN AN ORGANIZED HEALTH CARE EDUCATION/TRAINING PROGRAM

## 2022-11-15 PROCEDURE — A9585 GADOBUTROL INJECTION: HCPCS | Performed by: INTERNAL MEDICINE

## 2022-11-15 RX ORDER — ASPIRIN 325 MG
325 TABLET, DELAYED RELEASE (ENTERIC COATED) ORAL ONCE
Status: DISCONTINUED | OUTPATIENT
Start: 2022-11-15 | End: 2022-11-17

## 2022-11-15 RX ORDER — IBUPROFEN 200 MG
16 TABLET ORAL
Status: DISCONTINUED | OUTPATIENT
Start: 2022-11-15 | End: 2022-11-18 | Stop reason: HOSPADM

## 2022-11-15 RX ORDER — HYDROCODONE BITARTRATE AND ACETAMINOPHEN 500; 5 MG/1; MG/1
TABLET ORAL
Status: DISCONTINUED | OUTPATIENT
Start: 2022-11-15 | End: 2022-11-18 | Stop reason: HOSPADM

## 2022-11-15 RX ORDER — SODIUM CHLORIDE 0.9 % (FLUSH) 0.9 %
10 SYRINGE (ML) INJECTION
Status: DISCONTINUED | OUTPATIENT
Start: 2022-11-15 | End: 2022-11-18 | Stop reason: HOSPADM

## 2022-11-15 RX ORDER — GLUCAGON 1 MG
1 KIT INJECTION
Status: DISCONTINUED | OUTPATIENT
Start: 2022-11-15 | End: 2022-11-18 | Stop reason: HOSPADM

## 2022-11-15 RX ORDER — ASPIRIN 81 MG/1
81 TABLET ORAL DAILY
Status: DISCONTINUED | OUTPATIENT
Start: 2022-11-16 | End: 2022-11-17

## 2022-11-15 RX ORDER — IBUPROFEN 200 MG
24 TABLET ORAL
Status: DISCONTINUED | OUTPATIENT
Start: 2022-11-15 | End: 2022-11-18 | Stop reason: HOSPADM

## 2022-11-15 RX ORDER — HYDROXYZINE PAMOATE 25 MG/1
25 CAPSULE ORAL EVERY 8 HOURS PRN
Status: DISCONTINUED | OUTPATIENT
Start: 2022-11-15 | End: 2022-11-18 | Stop reason: HOSPADM

## 2022-11-15 RX ORDER — LOSARTAN POTASSIUM 50 MG/1
100 TABLET ORAL DAILY
Status: DISCONTINUED | OUTPATIENT
Start: 2022-11-16 | End: 2022-11-18 | Stop reason: HOSPADM

## 2022-11-15 RX ORDER — ACETAMINOPHEN 325 MG/1
650 TABLET ORAL EVERY 4 HOURS PRN
Status: DISCONTINUED | OUTPATIENT
Start: 2022-11-15 | End: 2022-11-18 | Stop reason: HOSPADM

## 2022-11-15 RX ORDER — SODIUM CHLORIDE 0.9 % (FLUSH) 0.9 %
10 SYRINGE (ML) INJECTION EVERY 12 HOURS PRN
Status: DISCONTINUED | OUTPATIENT
Start: 2022-11-15 | End: 2022-11-18 | Stop reason: HOSPADM

## 2022-11-15 RX ORDER — MORPHINE SULFATE 2 MG/ML
2 INJECTION, SOLUTION INTRAMUSCULAR; INTRAVENOUS ONCE
Status: COMPLETED | OUTPATIENT
Start: 2022-11-15 | End: 2022-11-15

## 2022-11-15 RX ORDER — METHOCARBAMOL 500 MG/1
TABLET, FILM COATED ORAL
COMMUNITY
Start: 2022-11-10 | End: 2022-11-15

## 2022-11-15 RX ORDER — TALC
6 POWDER (GRAM) TOPICAL NIGHTLY PRN
Status: DISCONTINUED | OUTPATIENT
Start: 2022-11-15 | End: 2022-11-18 | Stop reason: HOSPADM

## 2022-11-15 RX ORDER — PROCHLORPERAZINE EDISYLATE 5 MG/ML
10 INJECTION INTRAMUSCULAR; INTRAVENOUS
Status: COMPLETED | OUTPATIENT
Start: 2022-11-15 | End: 2022-11-15

## 2022-11-15 RX ORDER — ASPIRIN 300 MG/1
600 SUPPOSITORY RECTAL DAILY
Status: DISCONTINUED | OUTPATIENT
Start: 2022-11-15 | End: 2022-11-15

## 2022-11-15 RX ORDER — NALOXONE HCL 0.4 MG/ML
0.02 VIAL (ML) INJECTION
Status: DISCONTINUED | OUTPATIENT
Start: 2022-11-15 | End: 2022-11-18 | Stop reason: HOSPADM

## 2022-11-15 RX ORDER — ONDANSETRON 2 MG/ML
4 INJECTION INTRAMUSCULAR; INTRAVENOUS EVERY 8 HOURS PRN
Status: DISCONTINUED | OUTPATIENT
Start: 2022-11-15 | End: 2022-11-18 | Stop reason: HOSPADM

## 2022-11-15 RX ORDER — METHOCARBAMOL 500 MG/1
500 TABLET, FILM COATED ORAL
COMMUNITY
Start: 2022-11-10 | End: 2022-11-15

## 2022-11-15 RX ORDER — INSULIN ASPART 100 [IU]/ML
0-5 INJECTION, SOLUTION INTRAVENOUS; SUBCUTANEOUS
Status: DISCONTINUED | OUTPATIENT
Start: 2022-11-15 | End: 2022-11-18 | Stop reason: HOSPADM

## 2022-11-15 RX ORDER — HYDROCODONE BITARTRATE AND ACETAMINOPHEN 5; 325 MG/1; MG/1
1 TABLET ORAL EVERY 6 HOURS PRN
Status: DISCONTINUED | OUTPATIENT
Start: 2022-11-15 | End: 2022-11-18 | Stop reason: HOSPADM

## 2022-11-15 RX ORDER — GADOBUTROL 604.72 MG/ML
7.5 INJECTION INTRAVENOUS
Status: COMPLETED | OUTPATIENT
Start: 2022-11-15 | End: 2022-11-15

## 2022-11-15 RX ORDER — LOPERAMIDE HYDROCHLORIDE 2 MG/1
2 CAPSULE ORAL 4 TIMES DAILY PRN
COMMUNITY

## 2022-11-15 RX ORDER — ATORVASTATIN CALCIUM 40 MG/1
80 TABLET, FILM COATED ORAL DAILY
Status: DISCONTINUED | OUTPATIENT
Start: 2022-11-16 | End: 2022-11-18 | Stop reason: HOSPADM

## 2022-11-15 RX ADMIN — AZITHROMYCIN MONOHYDRATE 500 MG: 500 INJECTION, POWDER, LYOPHILIZED, FOR SOLUTION INTRAVENOUS at 02:11

## 2022-11-15 RX ADMIN — PROCHLORPERAZINE EDISYLATE 10 MG: 5 INJECTION INTRAMUSCULAR; INTRAVENOUS at 11:11

## 2022-11-15 RX ADMIN — MORPHINE SULFATE 2 MG: 2 INJECTION, SOLUTION INTRAMUSCULAR; INTRAVENOUS at 11:11

## 2022-11-15 RX ADMIN — CEFTRIAXONE 1 G: 1 INJECTION, SOLUTION INTRAVENOUS at 01:11

## 2022-11-15 RX ADMIN — SODIUM CHLORIDE 1000 ML: 0.9 INJECTION, SOLUTION INTRAVENOUS at 11:11

## 2022-11-15 RX ADMIN — GADOBUTROL 7.5 ML: 604.72 INJECTION INTRAVENOUS at 05:11

## 2022-11-15 RX ADMIN — IOHEXOL 100 ML: 350 INJECTION, SOLUTION INTRAVENOUS at 07:11

## 2022-11-15 NOTE — ASSESSMENT & PLAN NOTE
Outpatient management by Hem/Onc  Analgesics PRN  Maintain O2 >90%  Pulm consulted for thoracentesis

## 2022-11-15 NOTE — ASSESSMENT & PLAN NOTE
H/H 6.1/19.6  1u PRBCs prepared and hung but patient experienced AMS  New unit PRBCs ordered for patient to receive upon arrival to floor  Recheck H/H

## 2022-11-15 NOTE — ASSESSMENT & PLAN NOTE
Likely r/t non-small cell cancer of R lung  Present on CXR 11/15:  -Worsening pulmonary edema pneumonia aspiration or sepsis.  Maintain O2 sats >90%  Pulmonology consulted for thoracentesis

## 2022-11-15 NOTE — SUBJECTIVE & OBJECTIVE
Past Medical History:   Diagnosis Date    Arthritis     Cancer     Diabetes mellitus, type II     fasting bg criteria    Hyperlipidemia     Hypertension     Ischemic cerebrovascular accident (CVA) 11/15/2022       Past Surgical History:   Procedure Laterality Date    TONSILLECTOMY         Family History   Problem Relation Age of Onset    Cancer Mother         unknown, met to spine    Hypertension Mother     Osteoarthritis Mother     Deep vein thrombosis Father     Heart attack Father     Pulmonary embolism Father     Emphysema Father     Arthritis Sister     No Known Problems Daughter     No Known Problems Son     Fibromyalgia Sister     Breast cancer Maternal Aunt     Heart disease Neg Hx     Prostate cancer Neg Hx     Colon cancer Neg Hx     Diabetes Neg Hx        Social History     Socioeconomic History    Marital status:    Tobacco Use    Smoking status: Never    Smokeless tobacco: Never   Substance and Sexual Activity    Alcohol use: Yes     Comment: Occasionally    Drug use: No    Sexual activity: Yes     Partners: Female   Social History Narrative    Lives in [place] with his wife. He is retired from [employer]. He is originally from [place]. 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  2 daughters with marital and family problems. One of them is  and remarried because her  has a serious anger problems. His other son-in-law is in treatment for drug and alcohol problems. He has 5 grand children. He was in the [employer]. He goes to the gym. His son loves to hunt. He bought a small [employer]     Social Determinants of Health     Financial Resource Strain: Low Risk     Difficulty of Paying Living Expenses: Not hard at all   Food Insecurity: No Food Insecurity    Worried About Running Out of Food in the Last Year: Never true    Ran Out of Food in the Last Year: Never true   Transportation Needs: No Transportation Needs    Lack of Transportation (Medical): No    Lack of Transportation (Non-Medical): No   Physical  Activity: Unknown    Days of Exercise per Week: Patient refused    Minutes of Exercise per Session: Patient refused   Stress: Unknown    Feeling of Stress : Patient refused   Social Connections: Unknown    Frequency of Communication with Friends and Family: More than three times a week    Frequency of Social Gatherings with Friends and Family: More than three times a week    Attends Anabaptism Services: Patient refused    Active Member of Clubs or Organizations: Patient refused    Attends Club or Organization Meetings: Patient refused    Marital Status:    Housing Stability: Unknown    Unable to Pay for Housing in the Last Year: No    Unstable Housing in the Last Year: No       Current Facility-Administered Medications   Medication Dose Route Frequency Provider Last Rate Last Admin    0.9%  NaCl infusion (for blood administration)   Intravenous Q24H PRN Chris Vance MD        0.9%  NaCl infusion (for blood administration)   Intravenous Q24H PRN Katie Patel, JOSE        acetaminophen tablet 650 mg  650 mg Oral Q4H PRN Katie Patel NP        aspirin EC tablet 325 mg  325 mg Oral Once Adrian Ramos MD        aspirin EC tablet 81 mg  81 mg Oral Daily Adrian Ramos MD        atorvastatin tablet 80 mg  80 mg Oral Daily Katie Patel NP        azithromycin 500 mg in dextrose 5 % 250 mL IVPB (ready to mix system)  500 mg Intravenous Q24H Katie Patel, JOSE        cefTRIAXone (ROCEPHIN) 1 g/50 mL D5W IVPB  1 g Intravenous Q24H Katie Patel, JOSE        dextrose 10% bolus 125 mL  12.5 g Intravenous PRN Katie Patel, NP        dextrose 10% bolus 125 mL  12.5 g Intravenous PRN Katie Patel, NP        dextrose 10% bolus 250 mL  25 g Intravenous PRN Katie Patel, NP        dextrose 10% bolus 250 mL  25 g Intravenous PRN Katie Patel, NP        glucagon (human recombinant) injection 1 mg  1 mg Intramuscular PRN Katie Patel, NP        glucagon (human recombinant) injection 1 mg  1 mg Intramuscular PRN Katie Patel,  "NP        glucose chewable tablet 16 g  16 g Oral PRN Katie Amanda, NP        glucose chewable tablet 16 g  16 g Oral PRN Katie Amanda, NP        glucose chewable tablet 24 g  24 g Oral PRN Katie Amanda, NP        glucose chewable tablet 24 g  24 g Oral PRN Katie Amanda, NP        HYDROcodone-acetaminophen 5-325 mg per tablet 1 tablet  1 tablet Oral Q6H PRN Katie Patel, NP        hydrOXYzine pamoate capsule 25 mg  25 mg Oral Q8H PRN Katie Amanda, NP        insulin aspart U-100 pen 0-5 Units  0-5 Units Subcutaneous QID (AC + HS) PRN Katie Amanda, NP        losartan tablet 100 mg  100 mg Oral Daily [facility], NP        melatonin tablet 6 mg  6 mg Oral Nightly PRN Katie Amanda, NP        naloxone 0.4 mg/mL injection 0.02 mg  0.02 mg Intravenous PRN [facility], NP        ondansetron injection 4 mg  4 mg Intravenous Q8H PRN Katie Amanda, NP        sodium chloride 0.9% flush 10 mL  10 mL Intravenous Q12H PRN Katie Amanda, NP        sodium chloride 0.9% flush 10 mL  10 mL Intravenous PRN [facility], NP         Home medications reviewed    Review of patient's allergies indicates:   Allergen Reactions    Asa [aspirin]      Patient states he doesn't have allergy to medication         Review of Systems   Unable to perform ROS: Other  - Due to aphasia      Objective:     Vitals:    11/16/22 0535 11/16/22 0600 11/16/22 0758 11/16/22 0900   BP: (!) 152/67  (!) 155/69    BP Location: Left arm  Left arm    Patient Position: Lying  Lying    Pulse: 92  95    Resp: 20  18    Temp: 97.7 °F (36.5 °C)  97.8 °F (36.6 °C)    TempSrc: Oral  Oral    SpO2: (!) 90%  (!) 90% (!) 92%   Weight:  81 kg (178 lb 9.2 oz)  80.7 kg (178 lb)   Height:    6' 2" (1.88 m)         Physical Exam  Vitals reviewed.   Constitutional:       General: He is not in acute distress.  HENT:      Head: Normocephalic.      Nose: Nose normal.   Pulmonary:      Effort: Pulmonary effort is normal. No respiratory distress.   Abdominal:      General: There is no " distension.      Tenderness: There is no guarding.   Musculoskeletal:      Cervical back: Normal range of motion.      Right lower leg: No edema.      Left lower leg: No edema.      Comments: Brace in place to right arm/shoulder   Skin:     General: Skin is dry.   Neurological:      Mental Status: He is alert.   Psychiatric:         Mood and Affect: Mood normal.         Behavior: Behavior normal.     Neurological Exam  LOC: alert  Attention Span: Good   Language: Expressive aphasia, Receptive aphasia  Articulation: Dysarthria  Orientation: Untestable due to severe aphasia   Visual Fields: Blinks to threat bilaterally  EOM (CN III, IV, VI): Full/intact  Facial Movement (CN VII): Symmetric facial expression    Motor: Arm left    Full antigravity; Full power noted with nurse assistance  Leg left    Full antigravity; Full power noted with nurse assistance  Arm right  Limited evaluate due to pain and brace - can bend arm at elbow but no antigravity given; hand strength slightly less as compared to left with nurse assitance  Leg right   Full antigravity; Full power noted with nurse assistance  Cerebellum: Unable to test due to aphasia  Sensation: Localizes to painful stimuli in all 4 extremities      Diagnostic Results     Brain Imaging   11/16/2022 MRI Brain  Diffusion restriction left frontal lobe and insula.  There are smaller areas of scattered diffusion restriction in both the the right and left occipital lobe, along with a small lesion in the right parietal and right cerebellum.  Susceptibility noted within infarcted area of the left frontal lobe.  Post contrast lesions noted.     Vessel Imaging  11/15/2022 CTA head and neck  No high-grade stenosis or occlusion.    Cardiac Imaging   TTE pending

## 2022-11-15 NOTE — PHARMACY MED REC
"Admission Medication History     The home medication history was taken by Marguerite Whaley CPhT.    Medication history obtained from, Patient Verified    You may go to "Admission" then "Reconcile Home Medications" tabs to review and/or act upon these items.     The home medication list has been updated by the Pharmacy department.   Please read ALL comments highlighted in yellow.   Please address this information as you see fit.    Feel free to contact us if you have any questions or require assistance.      The medications listed below were removed from the home medication list.  Please reorder if appropriate:  Patient reports no longer taking the following medication(s):  Co-enzyme Q-10 30 mg  Flonase 50 mcg  Melatonin 3 mg  Robaxin 500 mg      Marguerite Whaley CPhT.  Ext [phone]               .        "

## 2022-11-15 NOTE — ASSESSMENT & PLAN NOTE
Developed after beginning blood - suspected blood transfusion reaction vs stroke  Head CT:  -No acute large vascular territory infarct or intracranial hemorrhage identified.  If persistent neurologic deficit, MRI brain can be obtained.  -Involutional change and suspected sequela minimal chronic microvascular ischemic change.  MRI brain in process  Patient requires blood d/t significant anemia - nursing instructed to administer as slowly as possible

## 2022-11-15 NOTE — CONSULTS
[facility]  Vascular Neurology  Comprehensive Stroke Center  TeleVascular Neurology Acute Consultation Note      Consult to Telemedicine - Acute Stroke  Consult performed by: Mary Heller MD  Consult ordered by: Louis Preston MD        Consulting Provider: SUMIT CATALAN  Current Providers  No providers found    Patient Location:  [facility] EMERGENCY DEPARTMENT Emergency Department  Spoke hospital nurse at bedside with patient assisting consultant.     Patient information was obtained from patient and spouse/SO.         Assessment/Plan:       Diagnoses:   Encephalopathy  77M with PMHx metastatic NSCLC, DM, HLD, pathologic R humerus fracture, DVT on lovenox who presents with acute disorientation and difficulty answering questions while receiving a blood transfusion. Initially improved after CT and then recurred. Exam is limited due to confusion and multiple pathologic fractures, but no new weakness or numbness per providers; notable for inability to answer orientation questions or follow commands.      NCCTH was negative for acute hemorrhage or large territory ischemia. At this time, patient is not a candidate for acute stroke intervention. He is not a candidate for IV thrombolysis due to full dose anticoagulation and concern for metastatic brain disease on MRI. His exam at this time is not consistent with a large vessel occlusion.    Ddx transfusion reaction vs medication reaction vs seizure vs metastatic disease vs stroke/TIA. At this time, recommend expedited MRI brain with and without contrast to evaluate given his history of metastatic disease. Also consider general neurology consultation and EEG pending the results of these studies.         STROKE DOCUMENTATION     Acute Stroke Times:   Acute Stroke Times   Last Known Normal Date: 11/15/22  Last Known Normal Time: 1540  Symptom Onset Date: 11/15/22  Symptom Onset Time: 1540  Stroke Team Called Date: 11/15/22  Stroke Team  "Called Time: 1545  Stroke Team Arrival Date: 11/15/22  Stroke Team Arrival Time: 1549  Alteplase Recommended: No  Thrombectomy Recommended: No    NIH Scale:  Interval: baseline  1a. Level of Consciousness: 1-->Not alert, but arousable by minor stimulation to obey, answer, or respond  1b. LOC Questions: 2-->Answers neither question correctly  1c. LOC Commands: 2-->Performs neither task correctly  2. Best Gaze: 0-->Normal  3. Visual: 0-->No visual loss  4. Facial Palsy: 0-->Normal symmetrical movements  5a. Motor Arm, Left: 3-->No effort against gravity, limb falls  5b. Motor Arm, Right: 0-->No drift, limb holds 90 (or 45) degrees for full 10 secs  6a. Motor Leg, Left: 3-->No effort against gravity, leg falls to bed immediately  6b. Motor Leg, Right: 3-->No effort against gravity, leg falls to bed immediately  7. Limb Ataxia: 0-->Absent  8. Sensory: 0-->Normal, no sensory loss  9. Best Language: 2-->Severe aphasia, all communication is through fragmentary expression, great need for inference, questioning, and guessing by the listener. Range of information that can be exchanged is limited, listener carries burden of. . . (see row details)  10. Dysarthria: 1-->Mild-to-moderate dysarthria, patient slurs at least some words and, at worst, can be understood with some difficulty  11. Extinction and Inattention (formerly Neglect): 0-->No abnormality  Total (NIH Stroke Scale): 17     Modified [place] Score: 1  [place] Coma Scale:    ABCD2 Score:    [id number] Score:   HAS -BLED Score:   ICH Score:   Hunt & Lezama Classification:     Blood pressure 138/65, pulse 96, temperature 99 °F (37.2 °C), resp. rate (!) 31, height 6' 2" (1.88 m), weight 77.1 kg (170 lb), SpO2 (!) 90 %.    Eligible for thrombolytic therapy?: No  Thrombolytic therapy recomended: Alteplase not recommended due to Full dose anticoagulation   Possible Interventional Revascularization Candidate? No; at this time symptoms not suggestive of large vessel " "occlusion    Disposition Recommendation: pending further studies    Subjective:     History of Present Illness:  77M with PMHx metastatic NSCLC, DM, HLD, pathologic R humerus fracture, DVT on lovenox who presents with acute disorientation while receiving a blood transfusion. LKN a few minutes prior to the transfusion, though provider at bedside noted mild confusion earlier in the day. Received compazine prior to the transfusion. No prior history of transfusion or transfusion reaction.         Woke up with symptoms?: no    Recent bleeding noted: no     Does the patient take any Blood Thinners? yes     Medications: Anticoagulants:  enoxaparin/Lovenox    Past Medical History: Cancer    Past Surgical History: no relevant surgical history    Family History: no relevant history    Social History: no smoking, no drinking, no drugs    Allergies: Asa [Aspirin]     Review of Systems   The following systems were reviewed with pertinent positives and negatives documented in the HPI: Constitutional, Eyes, CV, Respiratory, GI, , Musculoskeletal, Skin, Neurological, Psychiatric      Objective:   Vitals: Blood pressure 122/66, pulse 88, temperature 99 °F (37.2 °C), resp. rate 16, height 6' 2" (1.88 m), weight 77.1 kg (170 lb), SpO2 (!) 90 %.     CT READ: Yes  No hemmorhage. No mass effect. No early infarct signs.     Physical Exam  Vitals reviewed.   Constitutional:       Appearance: He is ill-appearing.   HENT:      Head: Normocephalic.      Nose: Nose normal.   Eyes:      Extraocular Movements: Extraocular movements intact.   Cardiovascular:      Rate and Rhythm: Normal rate.   Pulmonary:      Effort: Pulmonary effort is normal.   Abdominal:      General: Abdomen is flat.   Musculoskeletal:         General: Signs of injury present.      Cervical back: Normal range of motion.   Neurological:      Mental Status: He is disoriented.      Sensory: No sensory deficit.      Motor: Weakness present.   Psychiatric:         Mood and " Affect: Mood normal.         Behavior: Behavior normal.             Recommended the emergency room physician to have a brief discussion with the patient and/or family if available regarding the  risks and benefits of treatment, and to briefly document the occurrence of that discussion in his clinical encounter note.     The attending portion of this evaluation, treatment, and documentation was performed per Mary Heller MD via audiovisual.    Billing code:  (non-intervention mild to moderate stroke, TIA, some mimics)      Care was coordinated with other physicians involved in the patient's care.  Radiologic studies and laboratory data were reviewed and interpreted, and plan of care was re-assessed based on the results.  Diagnosis, treatment options and prognosis may have been discussed with the patient and/or family members or caregiver.    In your opinion, this was a: Tier 1 Van Positive    Consult End Time: 4:20 PM     Mary Heller MD, PhD  Comprehensive Stroke Center  Vascular Neurology   [facility]

## 2022-11-15 NOTE — ED NOTES
Review of patient's allergies indicates:   Allergen Reactions    Asa [aspirin]      Patient states he doesn't have allergy to medication        Patient has verified the spelling of their name and  on armband.   APPEARANCE: Patient is alert, calm, oriented x 4, and does not appear distressed.  SKIN: Skin is normal for race, warm, and dry. Normal skin turgor and mucous membranes moist.denies fever.  CARDIAC: Normal rate and rhythm, no murmur heard. Pt weak last 2 days.  RESPIRATORY:Normal rate and effort. Breath sounds clear bilaterally diminished throughout chest. Respirations are equal and unlabored.    GASTRO: Bowel sounds normal, abdomen is soft, no tenderness, and no abdominal .  PERIPHERAL VASCULAR: peripheral pulses present. Normal cap refill. No edema. Warm to touch.  NEURO: 5/5 strength major flexors/extensors bilaterally. Sensory intact to light touch bilaterally. Suzan coma scale: eyes open spontaneously-4, oriented & converses-5, obeys commands-6. No neurological abnormalities.   MENTAL STATUS: awake, alert and aware of environment.  EYE: No overt deficits noted. No drainage. Sclera WNL  GENITALIA: Normal external genitalia.  : Voids without complication

## 2022-11-15 NOTE — ED PROVIDER NOTES
Encounter Date: 11/15/2022       History     Chief Complaint   Patient presents with    generalized weakness    Hip Pain     Arrived via [facility] w c/o non-traumatic L hip pain and generalized weakness for several days. Dx with lung CA with mets 2 weeks ago. Family originally called EMS for low O2 sats. Pt denies ever experiencing any SOB. On arrival O2 sats 95% and in no distress     77-year-old male brought to the emergency department for evaluation of left hip pain, hypoxemia.  Patient with metastatic colon cancer.  Had recent right arm fracture.  States he felt lightheaded earlier today and had a low pulse ox reading.  This was self-limiting.  Denies any shortness of breath during that time.  Does note persistent left hip pain for the last week or so.  Denies any recent fall or trauma to the hip.  Denies any focal numbness or weakness, loss of continence, difficulty urinating or defecating.  Does note some diarrhea recently.  Denies any abdominal pain, vomiting, fever.  No other symptoms reported at this time.    Review of patient's allergies indicates:   Allergen Reactions    Asa [aspirin]      Patient states he doesn't have allergy to medication     Past Medical History:   Diagnosis Date    Arthritis     Diabetes mellitus, type II     fasting bg criteria    Hyperlipidemia      Past Surgical History:   Procedure Laterality Date    TONSILLECTOMY       Family History   Problem Relation Age of Onset    Cancer Mother         unknown, met to spine    Hypertension Mother     Osteoarthritis Mother     Deep vein thrombosis Father     Heart attack Father     Pulmonary embolism Father     Emphysema Father     Arthritis Sister     No Known Problems Daughter     No Known Problems Son     Fibromyalgia Sister     Breast cancer Maternal Aunt     Heart disease Neg Hx     Prostate cancer Neg Hx     Colon cancer Neg Hx     Diabetes Neg Hx      Social History     Tobacco Use    Smoking status: Never    Smokeless tobacco:  Never   Substance Use Topics    Alcohol use: Yes     Comment: Occasionally    Drug use: No     Review of Systems   Constitutional:  Positive for fatigue. Negative for chills and fever.   HENT:  Negative for congestion and sore throat.    Eyes:  Negative for photophobia and visual disturbance.   Respiratory:  Negative for cough and shortness of breath.    Cardiovascular:  Negative for chest pain and palpitations.   Gastrointestinal:  Positive for diarrhea. Negative for abdominal pain and vomiting.   Musculoskeletal:  Negative for back pain, neck pain and neck stiffness.   Neurological:  Positive for light-headedness. Negative for numbness and headaches.     Physical Exam     Initial Vitals [11/15/22 1031]   BP Pulse Resp Temp SpO2   135/60 94 20 98.1 °F (36.7 °C) 95 %      MAP       --         Physical Exam    Nursing note and vitals reviewed.  Constitutional: He appears well-developed and well-nourished. No distress.   HENT:   Head: Normocephalic and atraumatic.   Eyes: Conjunctivae and EOM are normal. Pupils are equal, round, and reactive to light.   Neck: Neck supple. No tracheal deviation present.   Normal range of motion.  Musculoskeletal:      Cervical back: Normal range of motion and neck supple.         ED Course   Procedures  Labs Reviewed   CBC W/ AUTO DIFFERENTIAL - Abnormal; Notable for the following components:       Result Value    WBC 18.43 (*)     RBC 2.06 (*)     Hemoglobin 6.8 (*)     Hematocrit 21.0 (*)      (*)     MCH 33.0 (*)     RDW 17.6 (*)     Platelets 77 (*)     nRBC 1 (*)     Gran % 95.0 (*)     Lymph % 3.0 (*)     Mono % 2.0 (*)     Platelet Estimate Decreased (*)     All other components within normal limits   COMPREHENSIVE METABOLIC PANEL - Abnormal; Notable for the following components:    Sodium 135 (*)     CO2 20 (*)     Glucose 153 (*)     Albumin 2.5 (*)     Alkaline Phosphatase 546 (*)     All other components within normal limits   TROPONIN I - Abnormal; Notable for the  following components:    Troponin I 0.425 (*)     All other components within normal limits   MAGNESIUM   URINALYSIS   TYPE & SCREEN   PREPARE RBC SOFT     EKG Readings: (Independently Interpreted)   Initial Reading: No STEMI. Previous EKG: Compared with most recent EKG Previous EKG Date: 10/31/2022 (Minimal change). Rhythm: Normal Sinus Rhythm. Heart Rate: 91. Ectopy: No Ectopy. Conduction: Normal. ST Segments: Normal ST Segments. Axis: Normal.         X-Rays:   Independently Interpreted Readings:   Other Readings:  Imaging interpreted by radiologist and visualized by me:   Imaging Results              X-Ray Chest AP Portable (Final result)  Result time 11/15/22 11:44:05      Final result by Todd South III, MD (11/15/22 11:44:05)                   Impression:      Worsening pulmonary edema pneumonia aspiration or sepsis.      Electronically signed by: Todd South MD  Date:    11/15/2022  Time:    11:44               Narrative:    EXAMINATION:  XR CHEST AP PORTABLE    CLINICAL HISTORY:  Fatigue;    FINDINGS:  Chest one view AP portable.    There is cardiomegaly aortic plaque and DJD.  There is bilateral edema and a worsening right pleural effusion when compared to 11/02/2022.                                       X-Ray Pelvis Routine AP (Final result)  Result time 11/15/22 11:44:38      Final result by Todd South III, MD (11/15/22 11:44:38)                   Impression:      No complication seen.      Electronically signed by: Todd South MD  Date:    11/15/2022  Time:    11:44               Narrative:    EXAMINATION:  XR PELVIS ROUTINE AP    CLINICAL HISTORY:  Hip pain;    FINDINGS:  Pelvis routine AP.    There is a left EDEN in place good alignment and no complication.  The right hip demonstrates DJD and impingement change.  There is DJD of the spine SI joints and symphysis pubis.  No loosening or infection seen.                                      Medications   0.9%  NaCl infusion (for blood  administration) (has no administration in time range)   azithromycin 500 mg in dextrose 5 % 250 mL IVPB (ready to mix system) (has no administration in time range)   cefTRIAXone (ROCEPHIN) 1 g/50 mL D5W IVPB (has no administration in time range)   sodium chloride 0.9% bolus 1,000 mL (0 mLs Intravenous Stopped 11/15/22 1228)   prochlorperazine injection Soln 10 mg (10 mg Intravenous Given 11/15/22 1127)     Medical Decision Making:   Initial Assessment:   77-year-old male brought to emergency department for evaluation of transient hypoxemia as well as left hip pain  Differential Diagnosis:   CHF, COPD, pneumonia, fracture, dislocation, contusion, sprain, strain, radiculopathy, cauda equina  Independently Interpreted Test(s):   I have ordered and independently interpreted X-rays - see prior notes.  I have ordered and independently interpreted EKG Reading(s) - see prior notes  Clinical Tests:   Lab Tests: Reviewed       <> Summary of Lab: Concerning for anemia, elevated troponin  ED Management:  Patient given some IV fluid and Compazine.  Patient resting comfortably on re-evaluation.  Informed him of results well as plan to admit for further evaluation and management.  I have ordered a unit of blood to be transfused.  Patient has been consented by me for transfusion.  Discussed with [facility] hospitalist who agrees with plan to admit.                        Clinical Impression:   Final diagnoses:  [R53.83] Fatigue  [D64.9] Anemia        ED Disposition Condition    Observation Stable                Chris Vance MD  11/15/22 1895

## 2022-11-15 NOTE — HPI
77-year-old male presented to the emergency department for evaluation of left hip pain and hypoxemia.  Patient with metastatic lung cancer w/ bone metastasis. Had recent right arm fracture, for which he is scheduled to have surgery in the near future. States he felt lightheaded earlier today and had a low pulse ox reading.  This was self-limiting.  Denies any shortness of breath during that time.  Does note persistent left hip pain for the last week or so.  Denies any recent fall or trauma to the hip.  Denies any focal numbness or weakness, loss of continence, difficulty urinating or defecating.  Does note some diarrhea recently after taking Miralax following a period of constipation.  Denies any abdominal pain, vomiting, fever, or blood in stool.  No other symptoms reported at this time.

## 2022-11-15 NOTE — SIGNIFICANT EVENT
CODE STROKE DOCUMENTATION  [facility]   PROVIDER LEAD    Code Stroke called: 1545  Time arrived to pt's room: 1600    Reason for Code Stroke as reported by bedside nurse: aphasia and confusion  Time last seen normal (less than or equal to 3 hours = inclusion criteria for tPA): 1500    Past Medical History:   Diagnosis Date    Arthritis     Cancer     Diabetes mellitus, type II     fasting bg criteria    Hyperlipidemia     Hypertension        Condition of patient on my arrival:  Confused and having trouble speaking.    Blood pressure (SBP > 185 or DBP > 110 excludes for tPA):   BP Readings from Last 3 Encounters:   11/15/22 122/66   11/14/22 (!) 118/59   11/08/22 135/60     Pulse Readings from Last 3 Encounters:   11/15/22 88   11/14/22 95   11/08/22 97     Wt Readings from Last 3 Encounters:   11/15/22 77.1 kg (170 lb)   11/02/22 77.1 kg (169 lb 15.6 oz)   10/28/22 77.7 kg (171 lb 4.8 oz)       Blood glucose (>400 excludes for tPA): 153    EKG:  Normal sinus    CT head: No acute large vascular territory infarct or intracranial hemorrhage identified.  If persistent neurologic deficit, MRI brain can be obtained.     Involutional change and suspected sequela minimal chronic microvascular ischemic change.  PT > 15 seconds is contraindication (Do not collect if don't expect to use tPA): 13.4  INR > 1.7 is contraindication (Do not collect if don't expect to use tPA): 1.3  Creatinine: 0.7    AM labs reviewed:  - Platelet count (< 100K excludes for tPA): 77    Time of last anticoagulant (< 24 hours excludes for tPA):  No anticoagulation given per the MAR    Vascular Neurologist via telemedicine:  DR. LÓPEZ    Impression:   Encephalopathy  77M with PMHx metastatic NSCLC, DM, HLD, pathologic R humerus fracture, DVT on lovenox who presents with acute disorientation and difficulty answering questions while receiving a blood transfusion. Initially improved after CT and then recurred. Exam is limited due to confusion  and multiple pathologic fractures, but no new weakness or numbness per providers; notable for inability to answer orientation questions or follow commands.       NCCTH was negative for acute hemorrhage or large territory ischemia. At this time, patient is not a candidate for acute stroke intervention. He is not a candidate for IV thrombolysis due to full dose anticoagulation and concern for metastatic brain disease on MRI. His exam at this time is not consistent with a large vessel occlusion.     Ddx transfusion reaction vs medication reaction vs seizure vs metastatic disease vs stroke/TIA. At this time, recommend expedited MRI brain with and without contrast to evaluate given his history of metastatic disease. Also consider general neurology consultation and EEG pending the results of these studies.     Plan:  CT head negative for acute infarct negative for bleeding  Vascular Neurology recommending MRI brain with and without contrast  Not a tPA candidate  Vascular Neurology thinks this is more of encephalopathy---no need for any vascular surgical interventions at this time.    Code Stroke ended: 1645    Critical care time spent on the evaluation and treatment of severe organ dysfunction, review of pertinent labs and imaging studies, discussions with consulting providers and discussions with patient/family: 60 minutes.    Damien Wood NP  [facility]  Pager: [phone]

## 2022-11-15 NOTE — ASSESSMENT & PLAN NOTE
CXR:  -Worsening pulmonary edema pneumonia aspiration or sepsis  WBC 18.43  Patient afebrile  Sputum cx pending  Received 1x dose azithromycin and rocephin in ED  Continue empiric abx

## 2022-11-15 NOTE — ASSESSMENT & PLAN NOTE
Patient's FSGs are controlled on current medication regimen.  Last A1c reviewed-   Lab Results   Component Value Date    HGBA1C 6.1 (H) 09/08/2022     Most recent fingerstick glucose reviewed-   Recent Labs   Lab 11/15/22  1543   POCTGLUCOSE 194*     Current correctional scale  Low  Maintain anti-hyperglycemic dose as follows-   Antihyperglycemics (From admission, onward)    None        Hold Oral hypoglycemics while patient is in the hospital.

## 2022-11-15 NOTE — HPI
76 y/o male with HTN, HLD, DM2, metastatic CA (lung - mets to brain and bone), DVT (on eliquis), anemia who initially presented to the ER for generalized weakness and left hip pain.  Admitted for transfusion.  At 16:00 patient was noted to have confusion and difficulty speaking.  Stroke code was activated.  LKN was 15:00.  He was evaluated by Acute Stroke team.  He was not eligible for thrombolytic therapy due to full dose AC.  He was not felt to be IR candidate.  Today patient continues with both expressive and receptive aphasia.  Patient unable to provide history.  He was able to state his name but then perseverated on his name with additional questions.  Patient does not have history of prior stroke or TIA.

## 2022-11-15 NOTE — CONSULTS
[facility] Consult Note    Primary Attending Physician: Dr. Ramos  Primary Team: [facility]  Consultant Attending: Dr. Atkins  Consultant Fellow: Dr. 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 Patient was recently hospitalized on 10/31 for extensive LLE DVT and partially occlusive thrombus in the R femoral vein. Imaging at that time also revealed right hilar lung mass and pleural effusion. Thoracentesis was performed and 1L of pleural fluid was removed. Cytology/pathology was positive for likely primary adenocarcinoma. Patient recently seen by heme/onc for systemic therapy which patient scheduled to start in 3 weeks. Chest xray on admission notable for right pleural effusion. Pulmonology consulted for evaluation of effusion and potential thoracentesis.       Interval History:  Patient seen and examined today. Patient with primary complaint of hip pain. Patient noted to be saturating from 88-94% on room air. Patient denies SOB during examination. Patient stating that his breathing is currently at his baseline and denies increased sputum production or cough since last admission. Patient denies recent fevers, chills, chest pain, nausea, or vomiting.    Past Medical History:  Past Medical History:   Diagnosis Date    Arthritis     Cancer     Diabetes mellitus, type II     fasting bg criteria    Hyperlipidemia     Hypertension        Past Surgical History:  Past Surgical History:   Procedure Laterality Date    TONSILLECTOMY         Allergies:  Review of patient's allergies indicates:   Allergen Reactions    Asa [aspirin]      Patient states he doesn't have allergy to  medication       Medications:   In-Hospital Scheduled Medications:     In-Hospital PRN Medications:  sodium chloride, acetaminophen, dextrose 10%, dextrose 10%, glucagon (human recombinant), glucose, glucose, melatonin, naloxone, ondansetron, sodium chloride 0.9%   In-Hospital IV Infusion Medications:     Home Medications:  Prior to Admission medications    Medication Sig Start Date End Date Taking? Authorizing Provider   acetaminophen (TYLENOL) 500 MG tablet Take 2 tablets (1,000 mg total) by mouth every 6 (six) hours as needed for Pain. 11/8/22 12/8/22 Yes Ronald Ho Jr., MD   HYDROcodone-acetaminophen (NORCO) 5-325 mg per tablet Take 1 tablet by mouth every 6 (six) hours as needed for Pain. 11/8/22 12/8/22 Yes Ronald Ho Jr., MD   hydrOXYzine pamoate (VISTARIL) 25 MG Cap Take 1 capsule (25 mg total) by mouth every 8 (eight) hours as needed (anxiety). 11/2/22  Yes Kesha Higgins NP   losartan (COZAAR) 100 MG tablet TAKE 1 TABLET BY MOUTH EVERY DAY 5/20/22  Yes Marc Lakhani MD   rosuvastatin (CRESTOR) 20 MG tablet TAKE 1 TABLET BY MOUTH EVERY DAY 6/30/22  Yes Marc Lakhani MD   vitamin D (VITAMIN D3) 1000 units Tab Take 1,000 Units by mouth once daily.   Yes Historical Provider   apixaban (ELIQUIS) 5 mg Tab Take 1 tablet (5 mg total) by mouth 2 (two) times daily. 11/2/22 11/15/22 Yes Kesha Higgins NP   methocarbamoL (ROBAXIN) 500 MG Tab Take 500 mg by mouth. 11/10/22 11/15/22 Yes Historical Provider   enoxaparin (LOVENOX) 60 mg/0.6 mL Syrg Inject 0.6 mLs (60 mg total) into the skin As instructed (hold eliquis 2 days before port placement. inject 60mg subcutaneously twice daily on days -2 and -1 before port placement. resume apixaban after procedure.). 11/14/22   Rj Wilson MD   folic acid (FOLVITE) 1 MG tablet Take 1 tablet (1 mg total) by mouth once daily. 11/14/22 11/14/23  Rj Wilson MD   loperamide (IMODIUM) 2 mg capsule Take 2 mg by mouth 4 (four) times daily as  needed for Diarrhea.    Historical Provider   meloxicam (MOBIC) 7.5 MG tablet Take 1 tablet (7.5 mg total) by mouth once daily. TAKE WITH FOOD.  Patient taking differently: Take 7.5 mg by mouth once daily. Patient has not started taking medication has of yet 11/8/22 12/8/22  Ronald Ho Jr., MD   oxyCODONE (ROXICODONE) 10 mg Tab immediate release tablet Take 1 tablet (10 mg total) by mouth every 6 (six) hours as needed for Pain (chronic cancer-related pain).  Patient taking differently: Take 10 mg by mouth every 6 (six) hours as needed for Pain (chronic cancer-related pain). Patient has not started taking medication as of yet 11/14/22   Rj Wilson MD   zinc gluconate 50 mg tablet Take 50 mg by mouth once daily. PRN    Historical Provider   acetaminophen (TYLENOL) 500 MG tablet Take 500 mg by mouth every 6 (six) hours as needed for Pain.  11/15/22  Historical Provider   apixaban (ELIQUIS) 5 mg Tab Take 2 tablets (10 mg total) by mouth 2 (two) times daily. for 7 days, then 1 tablet (5mg) 2 times daily thereafter 11/2/22 11/15/22  Kesha Higgins NP   co-enzyme Q-10 30 mg capsule Take 30 mg by mouth once daily. 2/21/17 11/15/22  Historical Provider   fluticasone propionate (FLONASE) 50 mcg/actuation nasal spray 2 sprays (100 mcg total) by Each Nostril route once daily. 9/15/22 11/15/22  Marc Lakhani MD   melatonin (MELATIN) 3 mg tablet Take 2 tablets (6 mg total) by mouth nightly as needed for Insomnia. 11/2/22 11/15/22  Kesha Higgins NP   methocarbamoL (ROBAXIN) 500 MG Tab TAKE 1 TABLET BY MOUTH IN THE MORNING AND 1 AT NOON, AND 1 BEFORE BEDTIME 11/10/22 11/15/22  Historical Provider       Family History:  Family History   Problem Relation Age of Onset    Cancer Mother         unknown, met to spine    Hypertension Mother     Osteoarthritis Mother     Deep vein thrombosis Father     Heart attack Father     Pulmonary embolism Father     Emphysema Father     Arthritis Sister     No Known  "Problems Daughter     No Known Problems Son     Fibromyalgia Sister     Breast cancer Maternal Aunt     Heart disease Neg Hx     Prostate cancer Neg Hx     Colon cancer Neg Hx     Diabetes Neg Hx        Social History:  Social History     Tobacco Use    Smoking status: Never    Smokeless tobacco: Never   Substance Use Topics    Alcohol use: Yes     Comment: Occasionally    Drug use: No       Review of Systems:  Pertinent items are noted in HPI. All other systems are reviewed and are negative.     Objective:   Last 24 Hour Vital Signs:  BP  Min: 122/66  Max: 138/65  Temp  Av.7 °F (37.1 °C)  Min: 98.1 °F (36.7 °C)  Max: 99 °F (37.2 °C)  Pulse  Av.8  Min: 88  Max: 97  Resp  Av.1  Min: 16  Max: 31  SpO2  Av.3 %  Min: 89 %  Max: 95 %  Height  Av' 2" (188 cm)  Min: 6' 2" (188 cm)  Max: 6' 2" (188 cm)  Weight  Av.1 kg (170 lb)  Min: 77.1 kg (170 lb)  Max: 77.1 kg (170 lb)  No intake/output data recorded.    Physical Examination:  BP (!) 147/67   Pulse 93   Temp 96.3 °F (35.7 °C)   Resp 16   Ht 6' 2" (1.88 m)   Wt 81.8 kg (180 lb 5.4 oz)   SpO2 (!) 92%   BMI 23.15 kg/m²     General Appearance:    Alert, cooperative, lethargic, appears stated age   Head:    Normocephalic, without obvious abnormality, atraumatic   Eyes:    PERRL, conjunctiva/corneas clear, EOM's intact, fundi     benign, both eyes        Ears:    Normal TM's and external ear canals, both ears   Nose:   Nares normal, septum midline, mucosa normal, no drainage    or sinus tenderness   Throat:   Lips, mucosa, and tongue normal; teeth and gums normal   Neck:   Supple, symmetrical, trachea midline, no adenopathy;        thyroid:  No enlargement/tenderness/nodules; no carotid    bruit or JVD   Back:     Symmetric, no curvature, ROM normal, no CVA tenderness   Lungs:     Clear to auscultation bilaterally, respirations unlabored   Chest wall:    No tenderness or deformity   Heart:    Regular rate and rhythm, S1 and S2 normal, no " murmur, rub   or gallop   Abdomen:     Soft, non-tender, bowel sounds active all four quadrants,     no masses, no organomegaly           Extremities:   Extremities normal, atraumatic, no cyanosis or edema   Pulses:   2+ and symmetric all extremities   Skin:   Skin color, texture, turgor normal, no rashes or lesions   Lymph nodes:   Cervical, supraclavicular, and axillary nodes normal   Neurologic:   CNII-XII intact. Normal strength, sensation and reflexes       throughout         Laboratory:  Trended Lab Data:  Recent Labs   Lab 11/15/22  1112   WBC 18.43*   HGB 6.8*   HCT 21.0*   PLT 77*       Recent Labs   Lab 11/15/22  1112   *   K 3.9      CO2 20*   BUN 19   CREATININE 0.7   *   CALCIUM 8.7   MG 2.1       Recent Labs   Lab 11/15/22  1112   PROT 6.6   ALBUMIN 2.5*   BILITOT 0.7   AST 27   ALT 20   ALKPHOS 546*       No results for input(s): PROTIME, PTT, INR in the last 168 hours.    Cardiac:   Recent Labs   Lab 11/15/22  1112   TROPONINI 0.425*       FLP:   Lab Results   Component Value Date    CHOL 116 (L) 06/08/2022    HDL 33 (L) 06/08/2022    LDLCALC 63.4 06/08/2022    TRIG 98 06/08/2022    CHOLHDL 28.4 06/08/2022     DM:   Lab Results   Component Value Date    HGBA1C 6.1 (H) 09/08/2022    HGBA1C 6.7 (H) 06/08/2022    HGBA1C 6.6 (H) 02/18/2022    MICROALBUR 0.6 06/05/2019    LDLCALC 63.4 06/08/2022    CREATININE 0.7 11/15/2022     Thyroid:   Lab Results   Component Value Date    TSH 1.510 06/08/2022     Anemia:   Lab Results   Component Value Date    IRON 42 (L) 09/08/2022    TIBC 336 09/08/2022    FERRITIN 1,910 (H) 09/08/2022    BYIKITFL18 >2000 (H) 09/08/2022    FOLATE 14.9 09/08/2022     Urinalysis:   Lab Results   Component Value Date    LABURIN No growth 02/22/2022    COLORU Yellow 10/31/2022    SPECGRAV 1.015 10/31/2022    NITRITE Negative 10/31/2022    KETONESU Negative 10/31/2022    UROBILINOGEN Negative 10/31/2022       Microbiology:  Microbiology Results (last 7 days)        Procedure Component Value Units Date/Time    Culture, Respiratory with Gram Stain [[id number]]     Order Status: No result Specimen: Sputum, Expectorated             Radiology:  Chest Xray 11/15/2022: There is bilateral edema and a worsening right pleural effusion when compared to 11/02/2022.    I have personally reviewed the above labs and imaging.    Current Medications:     Infusions:       Scheduled:       PRN:  sodium chloride, acetaminophen, dextrose 10%, dextrose 10%, glucagon (human recombinant), glucose, glucose, melatonin, naloxone, ondansetron, sodium chloride 0.9%     Assessment:     Joi Castellanos is a 77 y.o. male with:  Patient Active Problem List    Diagnosis Date Noted    Encephalopathy 11/15/2022    Non-small cell cancer of right lung 11/14/2022    Anxiety 11/02/2022    Bone metastases 11/02/2022    Hilar mass 11/02/2022    Pleural effusion on right 11/02/2022    Mass of right lung 11/01/2022    Palliative care encounter 11/01/2022    DVT (deep venous thrombosis) 10/31/2022    Fracture of right humerus 10/31/2022    Anemia of chronic disease 10/31/2022    Impacted cerumen of right ear 10/31/2022    Lumbar pain 10/31/2022    Bone lesion 10/31/2022    Diabetes mellitus, type II     IFG (impaired fasting glucose)     Hypertension 03/22/2017    Hearing loss of left ear 08/19/2015    Hyperlipidemia 08/19/2015    ED (erectile dysfunction) 08/19/2015    Arthritis 11/18/2013        Plan:     #Right Pleural Effusion  -Patient with notable right pleural effusion on admission chest xray  -Patient currently with HgB of 6.1, Plt 77, and WBC 18.43  -Patient also on eliquis for hx of DVT  -At this time will hold off on therapeutic thoracentesis due to increased risk of bleed and asymptomatic respiratory status  -If patient becomes febrile and leukocytosis persists, can consider diagnostic thoracentesis for potential infectious etiology      Thank you for allowing us to participate in the care of this patient.  Please contact if you have any questions regarding this consult.    Bonilla Mulligan MD PGY-2  4:32 PM, 11/15/2022  [facility]

## 2022-11-15 NOTE — H&P
[facility]  History & Physical    Patient Name: Joi Castellanos  MRN: [id number]  Patient Class: OP- Observation  Admission Date: 11/15/2022  Attending Physician: Adrian Ramos MD   Primary Care Provider: Marc Lakhani MD         Patient information was obtained from patient, spouse/SO, past medical records and ER records.     Subjective:     Principal Problem:Anemia of chronic disease    Chief Complaint:   Chief Complaint   Patient presents with    generalized weakness    Hip Pain     Arrived via [facility] w c/o non-traumatic L hip pain and generalized weakness for several days. Dx with lung CA with mets 2 weeks ago. Family originally called EMS for low O2 sats. Pt denies ever experiencing any SOB. On arrival O2 sats 95% and in no distress        HPI: 77-year-old male presented to the emergency department for evaluation of left hip pain and hypoxemia.  Patient with metastatic lung cancer w/ bone metastasis. Had recent right arm fracture, for which he is scheduled to have surgery in the near future. States he felt lightheaded earlier today and had a low pulse ox reading.  This was self-limiting.  Denies any shortness of breath during that time.  Does note persistent left hip pain for the last week or so.  Denies any recent fall or trauma to the hip.  Denies any focal numbness or weakness, loss of continence, difficulty urinating or defecating.  Does note some diarrhea recently after taking Miralax following a period of constipation.  Denies any abdominal pain, vomiting, fever, or blood in stool.  No other symptoms reported at this time.        Past Medical History:   Diagnosis Date    Arthritis     Cancer     Diabetes mellitus, type II     fasting bg criteria    Hyperlipidemia     Hypertension     Ischemic cerebrovascular accident (CVA) 11/15/2022     Past Surgical History:   Procedure Laterality Date    TONSILLECTOMY       Review of patient's allergies indicates:   Allergen  Reactions    Asa [aspirin]      Patient states he doesn't have allergy to medication - was instructed by provider not to take d/t bleeding risk b/c he was taking a high dose previously       No current facility-administered medications on file prior to encounter.     Current Outpatient Medications on File Prior to Encounter   Medication Sig    hydrOXYzine pamoate (VISTARIL) 25 MG Cap Take 1 capsule (25 mg total) by mouth every 8 (eight) hours as needed (anxiety).    losartan (COZAAR) 100 MG tablet TAKE 1 TABLET BY MOUTH EVERY DAY    rosuvastatin (CRESTOR) 20 MG tablet TAKE 1 TABLET BY MOUTH EVERY DAY    vitamin D (VITAMIN D3) 1000 units Tab Take 1,000 Units by mouth once daily.    folic acid (FOLVITE) 1 MG tablet Take 1 tablet (1 mg total) by mouth once daily.    loperamide (IMODIUM) 2 mg capsule Take 2 mg by mouth 4 (four) times daily as needed for Diarrhea.    oxyCODONE (ROXICODONE) 10 mg Tab immediate release tablet Take 1 tablet (10 mg total) by mouth every 6 (six) hours as needed for Pain (chronic cancer-related pain).    zinc gluconate 50 mg tablet Take 50 mg by mouth once daily. PRN     Family History       Problem Relation (Age of Onset)    Arthritis Sister    Breast cancer Maternal Aunt    Cancer Mother    Deep vein thrombosis Father    Emphysema Father    Fibromyalgia Sister    Heart attack Father    Hypertension Mother    No Known Problems Daughter, Son    Osteoarthritis Mother    Pulmonary embolism Father          Tobacco Use    Smoking status: Never    Smokeless tobacco: Never   Substance and Sexual Activity    Alcohol use: Yes     Comment: Occasionally    Drug use: No    Sexual activity: Yes     Partners: Female     Review of Systems  Respiratory:  Positive for shortness of breath.    Cardiovascular:  Negative for chest pain.   Musculoskeletal:  Positive for arthralgias and myalgias.   Neurological:  Positive for weakness.     Temp: 97.7 °F (36.5 °C) (11/18/22 1221)  Pulse: 91 (11/18/22 1221)  Resp: 18  (11/18/22 1221)  BP: 134/64 (11/18/22 1221)  SpO2: (!) 90 % (11/18/22 1221)    Weight: 74 kg (163 lb 2.3 oz)  Body mass index is 20.95 kg/m².    Physical Exam  Vitals and nursing note reviewed.   Cardiovascular:      Rate and Rhythm: Normal rate and regular rhythm.   Pulmonary:      Effort: No respiratory distress.      Breath sounds: Decreased breath sounds present.   Abdominal:      General: Bowel sounds are normal.      Palpations: Abdomen is soft.   Skin:     General: Skin is warm.      Capillary Refill: Capillary refill takes less than 2 seconds.   Neurological:      Mental Status: He is alert and oriented to person, place, and time.      Motor: Weakness present.      Gait: Gait abnormal.     All pertinent labs within the past 24 hours have been reviewed.  I have reviewed all pertinent imaging results/findings within the past 24 hours.  Assessment/Plan:     * Anemia of chronic disease  H/H 6.1/19.6  1u PRBCs prepared and hung but patient experienced AMS  New unit PRBCs ordered for patient to receive upon arrival to floor  Recheck H/H       Leukocytosis  CXR:  -Worsening pulmonary edema pneumonia aspiration or sepsis  WBC 18.43  Patient afebrile  Sputum cx pending  Received 1x dose azithromycin and rocephin in ED  Continue empiric abx    Encephalopathy  Developed after beginning blood - suspected blood transfusion reaction vs stroke  Head CT:  -No acute large vascular territory infarct or intracranial hemorrhage identified.  If persistent neurologic deficit, MRI brain can be obtained.  -Involutional change and suspected sequela minimal chronic microvascular ischemic change.  MRI brain in process  Patient requires blood d/t significant anemia - nursing instructed to administer as slowly as possible    Non-small cell cancer of right lung  Outpatient management by Hem/Onc  Maintain O2 sats >90%  Analgesics PRN        Pleural effusion  Likely r/t non-small cell cancer of R lung  Present on CXR 11/15:  -Worsening  pulmonary edema pneumonia aspiration or sepsis.  Maintain O2 sats >90%  Pulmonology consulted for thoracentesis      Hilar mass  Outpatient management by Hem/Onc  Analgesics PRN  Maintain O2 >90%  Pulm consulted for thoracentesis      Bone metastases  Outpatient management by Hem/Onc  Analgesics PRN      Anxiety  Restart home PRN hydroxyzine      Fracture of right humerus  Surgery scheduled outpatient  Maintain splint and NWB  Analgesics PRN      DVT (deep venous thrombosis)  Anticoags o/h given risk of bleeding - restart when stable  Maintain SCDs        Diabetes mellitus, type II  Patient's FSGs are controlled on current medication regimen.  Last A1c reviewed-   Lab Results   Component Value Date    HGBA1C 6.1 (H) 09/08/2022     Most recent fingerstick glucose reviewed-   Recent Labs   Lab 11/15/22  1543   POCTGLUCOSE 194*     Current correctional scale  Low  Maintain anti-hyperglycemic dose as follows-   Antihyperglycemics (From admission, onward)      None          Hold Oral hypoglycemics while patient is in the hospital.    Hypertension  Restart home BP meds      Hyperlipidemia  Start atorvastatin      VTE Risk Mitigation (From admission, onward)           Ordered     Reason for No Pharmacological VTE Prophylaxis  Once        Question:  Reasons:  Answer:  Active Bleeding    11/15/22 1409     IP VTE HIGH RISK PATIENT  Once         11/15/22 1409     Place sequential compression device  Until discontinued         11/15/22 1409                   As clarification, on 11/15/22, patient should be placed in hospital observation services under my care in collaboration with Kyle Ramos MD. Katie Patel NP.         Katie Patel NP  Department of Hospital Medicine   [place] - Emergency Dept

## 2022-11-15 NOTE — Clinical Note
Diagnosis: Anemia [100787]   Future Attending Provider: SUMIT CATALAN [[id number]]   Admitting Provider:: SUMIT ACTALAN [[id number]]   Special Needs:: No Special Needs [1]

## 2022-11-16 PROBLEM — R79.89 ELEVATED TROPONIN: Status: ACTIVE | Noted: 2022-11-16

## 2022-11-16 PROBLEM — I63.89 ACUTE ISCHEMIC MULTIFOCAL MULTIPLE VASCULAR TERRITORIES STROKE: Status: ACTIVE | Noted: 2022-11-15

## 2022-11-16 LAB
ANION GAP SERPL CALC-SCNC: 11 MMOL/L (ref 8–16)
ANISOCYTOSIS BLD QL SMEAR: SLIGHT
ANISOCYTOSIS BLD QL SMEAR: SLIGHT
AORTIC ROOT ANNULUS: 3.24 CM
AORTIC VALVE CUSP SEPERATION: 1.75 CM
AV INDEX (PROSTH): 0.76
AV MEAN GRADIENT: 9 MMHG
AV PEAK GRADIENT: 18 MMHG
AV REGURGITATION PRESSURE HALF TIME: 238.05 MS
AV VALVE AREA: 2.92 CM2
AV VELOCITY RATIO: 0.74
BASOPHILS NFR BLD: 0 % (ref 0–1.9)
BASOPHILS NFR BLD: 0 % (ref 0–1.9)
BSA FOR ECHO PROCEDURE: 2.05 M2
BUN SERPL-MCNC: 16 MG/DL (ref 8–23)
BURR CELLS BLD QL SMEAR: ABNORMAL
CALCIUM SERPL-MCNC: 8.7 MG/DL (ref 8.7–10.5)
CHLORIDE SERPL-SCNC: 106 MMOL/L (ref 95–110)
CO2 SERPL-SCNC: 20 MMOL/L (ref 23–29)
CREAT SERPL-MCNC: 0.7 MG/DL (ref 0.5–1.4)
CV ECHO LV RWT: 0.36 CM
DACRYOCYTES BLD QL SMEAR: ABNORMAL
DACRYOCYTES BLD QL SMEAR: ABNORMAL
DIFFERENTIAL METHOD: ABNORMAL
DIFFERENTIAL METHOD: ABNORMAL
DOP CALC AO PEAK VEL: 2.1 M/S
DOP CALC AO VTI: 36.4 CM
DOP CALC LVOT AREA: 3.8 CM2
DOP CALC LVOT DIAMETER: 2.21 CM
DOP CALC LVOT PEAK VEL: 1.55 M/S
DOP CALC LVOT STROKE VOLUME: 106.2 CM3
DOP CALC MV VTI: 25.8 CM
DOP CALCLVOT PEAK VEL VTI: 27.7 CM
E WAVE DECELERATION TIME: 137.53 MSEC
E/A RATIO: 0.89
E/E' RATIO: 7 M/S
ECHO LV POSTERIOR WALL: 1.01 CM (ref 0.6–1.1)
EJECTION FRACTION: 55 %
EOSINOPHIL NFR BLD: 0 % (ref 0–8)
EOSINOPHIL NFR BLD: 1 % (ref 0–8)
ERYTHROCYTE [DISTWIDTH] IN BLOOD BY AUTOMATED COUNT: 18.1 % (ref 11.5–14.5)
ERYTHROCYTE [DISTWIDTH] IN BLOOD BY AUTOMATED COUNT: 18.6 % (ref 11.5–14.5)
EST. GFR  (NO RACE VARIABLE): >60 ML/MIN/1.73 M^2
FRACTIONAL SHORTENING: 38 % (ref 28–44)
GLUCOSE SERPL-MCNC: 135 MG/DL (ref 70–110)
HCT VFR BLD AUTO: 21.6 % (ref 40–54)
HCT VFR BLD AUTO: 22.4 % (ref 40–54)
HGB BLD-MCNC: 7.2 G/DL (ref 14–18)
HGB BLD-MCNC: 7.3 G/DL (ref 14–18)
HYPOCHROMIA BLD QL SMEAR: ABNORMAL
IMM GRANULOCYTES # BLD AUTO: ABNORMAL K/UL (ref 0–0.04)
IMM GRANULOCYTES # BLD AUTO: ABNORMAL K/UL (ref 0–0.04)
IMM GRANULOCYTES NFR BLD AUTO: ABNORMAL % (ref 0–0.5)
IMM GRANULOCYTES NFR BLD AUTO: ABNORMAL % (ref 0–0.5)
INTERVENTRICULAR SEPTUM: 0.92 CM (ref 0.6–1.1)
IRON SERPL-MCNC: 93 UG/DL (ref 45–160)
IVRT: 49.48 MSEC
LA MAJOR: 5.4 CM
LA MINOR: 4.74 CM
LA WIDTH: 5 CM
LEFT ATRIUM SIZE: 3.76 CM
LEFT ATRIUM VOLUME INDEX MOD: 25.6 ML/M2
LEFT ATRIUM VOLUME INDEX: 39 ML/M2
LEFT ATRIUM VOLUME MOD: 52.95 CM3
LEFT ATRIUM VOLUME: 80.68 CM3
LEFT INTERNAL DIMENSION IN SYSTOLE: 3.45 CM (ref 2.1–4)
LEFT VENTRICLE DIASTOLIC VOLUME INDEX: 73.6 ML/M2
LEFT VENTRICLE DIASTOLIC VOLUME: 152.36 ML
LEFT VENTRICLE MASS INDEX: 101 G/M2
LEFT VENTRICLE SYSTOLIC VOLUME INDEX: 23.7 ML/M2
LEFT VENTRICLE SYSTOLIC VOLUME: 49.11 ML
LEFT VENTRICULAR INTERNAL DIMENSION IN DIASTOLE: 5.58 CM (ref 3.5–6)
LEFT VENTRICULAR MASS: 208.45 G
LV LATERAL E/E' RATIO: 5.83 M/S
LV SEPTAL E/E' RATIO: 8.75 M/S
LVOT MG: 3.4 MMHG
LVOT MV: 0.82 CM/S
LYMPHOCYTES NFR BLD: 10 % (ref 18–48)
LYMPHOCYTES NFR BLD: 9 % (ref 18–48)
MAGNESIUM SERPL-MCNC: 2 MG/DL (ref 1.6–2.6)
MCH RBC QN AUTO: 32.6 PG (ref 27–31)
MCH RBC QN AUTO: 32.9 PG (ref 27–31)
MCHC RBC AUTO-ENTMCNC: 32.6 G/DL (ref 32–36)
MCHC RBC AUTO-ENTMCNC: 33.3 G/DL (ref 32–36)
MCV RBC AUTO: 101 FL (ref 82–98)
MCV RBC AUTO: 98 FL (ref 82–98)
MONOCYTES NFR BLD: 3 % (ref 4–15)
MONOCYTES NFR BLD: 5 % (ref 4–15)
MV MEAN GRADIENT: 3 MMHG
MV PEAK A VEL: 0.79 M/S
MV PEAK E VEL: 0.7 M/S
MV PEAK GRADIENT: 11 MMHG
MV STENOSIS PRESSURE HALF TIME: 39.88 MS
MV VALVE AREA BY CONTINUITY EQUATION: 4.12 CM2
MV VALVE AREA P 1/2 METHOD: 5.52 CM2
MYELOCYTES NFR BLD MANUAL: 2 %
NEUTROPHILS NFR BLD: 85 % (ref 38–73)
NEUTROPHILS NFR BLD: 85 % (ref 38–73)
NRBC BLD-RTO: 0 /100 WBC
NRBC BLD-RTO: 0 /100 WBC
OVALOCYTES BLD QL SMEAR: ABNORMAL
OVALOCYTES BLD QL SMEAR: ABNORMAL
PISA AR MAX VEL: 4.17 M/S
PISA TR MAX VEL: 3.35 M/S
PLATELET # BLD AUTO: 82 K/UL (ref 150–450)
PLATELET # BLD AUTO: 90 K/UL (ref 150–450)
PLATELET BLD QL SMEAR: ABNORMAL
PMV BLD AUTO: 9.6 FL (ref 9.2–12.9)
PMV BLD AUTO: 9.8 FL (ref 9.2–12.9)
POCT GLUCOSE: 148 MG/DL (ref 70–110)
POCT GLUCOSE: 163 MG/DL (ref 70–110)
POCT GLUCOSE: 197 MG/DL (ref 70–110)
POCT GLUCOSE: 221 MG/DL (ref 70–110)
POIKILOCYTOSIS BLD QL SMEAR: SLIGHT
POIKILOCYTOSIS BLD QL SMEAR: SLIGHT
POLYCHROMASIA BLD QL SMEAR: ABNORMAL
POLYCHROMASIA BLD QL SMEAR: ABNORMAL
POTASSIUM SERPL-SCNC: 3.6 MMOL/L (ref 3.5–5.1)
PULM VEIN S/D RATIO: 1.53
PV MV: 1.09 M/S
PV PEAK D VEL: 0.32 M/S
PV PEAK S VEL: 0.49 M/S
PV PEAK VELOCITY: 1.45 CM/S
RA MAJOR: 4.45 CM
RA PRESSURE: 8 MMHG
RA WIDTH: 3.14 CM
RBC # BLD AUTO: 2.21 M/UL (ref 4.6–6.2)
RBC # BLD AUTO: 2.22 M/UL (ref 4.6–6.2)
RIGHT VENTRICULAR END-DIASTOLIC DIMENSION: 2.25 CM
SATURATED IRON: 36 % (ref 20–50)
SODIUM SERPL-SCNC: 137 MMOL/L (ref 136–145)
TDI LATERAL: 0.12 M/S
TDI SEPTAL: 0.08 M/S
TDI: 0.1 M/S
TOTAL IRON BINDING CAPACITY: 259 UG/DL (ref 250–450)
TR MAX PG: 45 MMHG
TRANSFERRIN SERPL-MCNC: 175 MG/DL (ref 200–375)
TRICUSPID ANNULAR PLANE SYSTOLIC EXCURSION: 2.84 CM
TROPONIN I SERPL DL<=0.01 NG/ML-MCNC: 0.56 NG/ML (ref 0–0.03)
TROPONIN I SERPL DL<=0.01 NG/ML-MCNC: 0.59 NG/ML (ref 0–0.03)
TROPONIN I SERPL DL<=0.01 NG/ML-MCNC: 0.72 NG/ML (ref 0–0.03)
TV REST PULMONARY ARTERY PRESSURE: 53 MMHG
WBC # BLD AUTO: 17.51 K/UL (ref 3.9–12.7)
WBC # BLD AUTO: 19.63 K/UL (ref 3.9–12.7)

## 2022-11-16 PROCEDURE — 99497 PR ADVNCD CARE PLAN 30 MIN: ICD-10-PCS | Mod: 25,,,

## 2022-11-16 PROCEDURE — 84484 ASSAY OF TROPONIN QUANT: CPT | Mod: 91 | Performed by: NURSE PRACTITIONER

## 2022-11-16 PROCEDURE — 99497 ADVNCD CARE PLAN 30 MIN: CPT | Mod: 25,,,

## 2022-11-16 PROCEDURE — 97535 SELF CARE MNGMENT TRAINING: CPT

## 2022-11-16 PROCEDURE — G0427 INPT/ED TELECONSULT70: HCPCS | Mod: 95,,, | Performed by: NURSE PRACTITIONER

## 2022-11-16 PROCEDURE — 85027 COMPLETE CBC AUTOMATED: CPT | Performed by: NURSE PRACTITIONER

## 2022-11-16 PROCEDURE — 92610 EVALUATE SWALLOWING FUNCTION: CPT

## 2022-11-16 PROCEDURE — 99900035 HC TECH TIME PER 15 MIN (STAT)

## 2022-11-16 PROCEDURE — 85007 BL SMEAR W/DIFF WBC COUNT: CPT | Performed by: NURSE PRACTITIONER

## 2022-11-16 PROCEDURE — 83735 ASSAY OF MAGNESIUM: CPT | Performed by: NURSE PRACTITIONER

## 2022-11-16 PROCEDURE — 80048 BASIC METABOLIC PNL TOTAL CA: CPT | Performed by: NURSE PRACTITIONER

## 2022-11-16 PROCEDURE — 99223 1ST HOSP IP/OBS HIGH 75: CPT | Mod: ,,,

## 2022-11-16 PROCEDURE — 11000001 HC ACUTE MED/SURG PRIVATE ROOM

## 2022-11-16 PROCEDURE — 99223 PR INITIAL HOSPITAL CARE,LEVL III: ICD-10-PCS | Mod: ,,,

## 2022-11-16 PROCEDURE — 94761 N-INVAS EAR/PLS OXIMETRY MLT: CPT

## 2022-11-16 PROCEDURE — 96375 TX/PRO/DX INJ NEW DRUG ADDON: CPT

## 2022-11-16 PROCEDURE — 25000003 PHARM REV CODE 250: Performed by: NURSE PRACTITIONER

## 2022-11-16 PROCEDURE — G0427 PR INPT TELEHEALTH CON 70/>M: ICD-10-PCS | Mod: 95,,, | Performed by: NURSE PRACTITIONER

## 2022-11-16 PROCEDURE — 27000221 HC OXYGEN, UP TO 24 HOURS

## 2022-11-16 PROCEDURE — 36415 COLL VENOUS BLD VENIPUNCTURE: CPT | Performed by: NURSE PRACTITIONER

## 2022-11-16 PROCEDURE — 63600175 PHARM REV CODE 636 W HCPCS: Performed by: NURSE PRACTITIONER

## 2022-11-16 RX ORDER — POTASSIUM CHLORIDE 7.45 MG/ML
10 INJECTION INTRAVENOUS ONCE
Status: COMPLETED | OUTPATIENT
Start: 2022-11-16 | End: 2022-11-16

## 2022-11-16 RX ADMIN — POTASSIUM CHLORIDE 10 MEQ: 7.46 INJECTION, SOLUTION INTRAVENOUS at 09:11

## 2022-11-16 RX ADMIN — AZITHROMYCIN MONOHYDRATE 500 MG: 500 INJECTION, POWDER, LYOPHILIZED, FOR SOLUTION INTRAVENOUS at 12:11

## 2022-11-16 RX ADMIN — HYDROCODONE BITARTRATE AND ACETAMINOPHEN 1 TABLET: 5; 325 TABLET ORAL at 10:11

## 2022-11-16 RX ADMIN — CEFTRIAXONE 1 G: 1 INJECTION, SOLUTION INTRAVENOUS at 12:11

## 2022-11-16 NOTE — SUBJECTIVE & OBJECTIVE
Interval History: Unable to perform ROS given mental status.     Review of Systems   Unable to perform ROS: Mental status change   Objective:     Vital Signs (Most Recent):  Temp: 98.9 °F (37.2 °C) (11/16/22 1202)  Pulse: 104 (11/16/22 1223)  Resp: 18 (11/16/22 1202)  BP: (!) 157/70 (11/16/22 1202)  SpO2: (!) 89 % (11/16/22 1202)   Vital Signs (24h Range):  Temp:  [96.3 °F (35.7 °C)-99 °F (37.2 °C)] 98.9 °F (37.2 °C)  Pulse:  [] 104  Resp:  [16-31] 18  SpO2:  [89 %-94 %] 89 %  BP: (122-157)/(57-76) 157/70     Weight: 80.7 kg (178 lb)  Body mass index is 22.85 kg/m².    Intake/Output Summary (Last 24 hours) at 11/16/2022 1236  Last data filed at 11/15/2022 2310  Gross per 24 hour   Intake 1006.25 ml   Output 500 ml   Net 506.25 ml      Physical Exam  Constitutional:       Comments: drowsy   HENT:      Head: Normocephalic and atraumatic.      Nose: Nose normal.      Mouth/Throat:      Mouth: Mucous membranes are moist.      Pharynx: Oropharynx is clear.   Eyes:      Extraocular Movements: Extraocular movements intact.      Pupils: Pupils are equal, round, and reactive to light.   Cardiovascular:      Rate and Rhythm: Normal rate and regular rhythm.      Pulses: Normal pulses.      Heart sounds: Normal heart sounds.   Pulmonary:      Effort: Pulmonary effort is normal.      Breath sounds: Normal breath sounds.   Abdominal:      General: There is no distension.      Palpations: Abdomen is soft.      Tenderness: There is no abdominal tenderness.   Musculoskeletal:         General: Normal range of motion.      Cervical back: Normal range of motion.   Skin:     General: Skin is warm.      Capillary Refill: Capillary refill takes 2 to 3 seconds.   Neurological:      Mental Status: He is easily aroused. He is disoriented and confused.      Motor: No weakness.       Significant Labs: All pertinent labs within the past 24 hours have been reviewed.    Significant Imaging: I have reviewed all pertinent imaging  results/findings within the past 24 hours.

## 2022-11-16 NOTE — CONSULTS
[place] - Telemetry  Vascular Neurology  [facility]  TeleVascular Neurology Consult Note    Inpatient Consult Tele-Vascular Neurology  Consult performed by: Verenice Mukherjee NP  Consult ordered by: Damien Wood NP        Assessment/Plan:     Patient is a 77 y.o. year old male with:    Acute ischemic multifocal multiple vascular territories stroke  78 y/o male with HTN, HLD, DM2, metastatic CA (lung - mets to brain and bone), DVT (on eliquis), anemia now with embolic event causing infarcts primarily to left frontal lobe and insula, along with scattered infarcts in the bilateral occipital lobe in addition to a lesion in the right parietal and right cerebellum.  CTA head an neck with no high-grade stenosis or occlusion.  TTE pending, but preliminary report does show left atrial enlargement.  Patient with recent admission for DVT and has been on apixaban.  Current etiology ESUS - could be hypercoagulable state or underlying PAF.    Antithrombotics for secondary stroke prevention: Antiplatelets: Aspirin: 81 mg daily - can discontinue when AC resumed for DVT    Statins for secondary stroke prevention and hyperlipidemia, if present:   Statins: Atorvastatin- 40 mg daily -can continue home crestor at discharge    Aggressive risk factor modification: HTN, DM, HLD     Rehab efforts: The patient has been evaluated by a stroke team provider and the therapy needs have been fully considered based off the presenting complaints and exam findings. The following therapy evaluations are needed: PT evaluate and treat, OT evaluate and treat, SLP evaluate and treat    Diagnostics ordered/pending: TTE to assess cardiac function/status     VTE prophylaxis: Heparin 5000 units SQ every 8 hours - discontinue if AC resumed    -Follow TTE - preliminary report shows left atrial enlargement - recommend 30 day event monitor to investigate for underlying PAF  -30 day event monitor with autotrigger (CV05).  Please call  [phone] to notify the tech (the order does not automatically cross over).  The device will be mailed to the patient.  The company will call from a [phone] number to confirm the address.  Please share this information with the patient.  -Ambulatory referral to Vascular Neurology in 4-6 weeks (Consult Order [id number])          Encephalopathy  -    Diabetes mellitus, type II  -Stroke risk factor  -A1c 5.7  -Goal glucose 140-180 while hospitalized  -Encourage continued compliance with diet, activity, and medications      Hypertension  -Stroke risk factor  -SBP < 220 - can resume home regiment to slowly reduce BP  -Long term goal < 130/80      Hyperlipidemia  -Stroke risk factor  -LDL 57.0  -Atorvastatin 40mg - can resume home crestor 20mg at discharge for goal LDL < 70          STROKE DOCUMENTATION     Acute Stroke Times   Last Known Normal Date: 11/15/22  Last Known Normal Time: 1540  Symptom Onset Date: 11/15/22  Symptom Onset Time: 1540  Stroke Team Called Date: 11/15/22  Stroke Team Called Time: 1545  Stroke Team Arrival Date: 11/15/22  Stroke Team Arrival Time: 1549  Thrombolytic Therapy Recommended: No  Thrombectomy Recommended: No    NIH Scale:  1a. Level of Consciousness: 0-->Alert, keenly responsive  1b. LOC Questions: 2-->Answers neither question correctly  1c. LOC Commands: 1-->Performs one task correctly  2. Best Gaze: 0-->Normal  3. Visual: 0-->No visual loss  4. Facial Palsy: 0-->Normal symmetrical movements  5a. Motor Arm, Left: 0-->No drift, limb holds 90 (or 45) degrees for full 10 secs  5b. Motor Arm, Right: 3-->No effort against gravity, limb falls  6a. Motor Leg, Left: 0-->No drift, leg holds 30 degree position for full 5 secs  6b. Motor Leg, Right: 0-->No drift, leg holds 30 degree position for full 5 secs  7. Limb Ataxia: 0-->Absent  8. Sensory: 0-->Normal, no sensory loss  9. Best Language: 2-->Severe aphasia, all communication is through fragmentary expression, great need for inference, questioning, and  guessing by the listener. Range of information that can be exchanged is limited, listener carries burden of. . . (see row details)  10. Dysarthria: 0-->Normal  11. Extinction and Inattention (formerly Neglect): 0-->No abnormality  Total (NIH Stroke Scale): 8    Modified Conrado    Victoria Coma Scale:    ABCD2 Score:    [id number] Score:   HAS -BLED Score:   ICH Score:   Hunt & Lezama Classification:       Thrombolysis Candidate? No, Current use of direct thrombin inhibitors (dabigatran) or direct factor Xa inhibitors within 48 hours    Delays to Thrombolysis?  Not Applicable    Interventional Revascularization Candidate?   Is the patient eligible for mechanical endovascular reperfusion (JERED)?  No; No large vessel occlusion identified on imaging     Delays to Thrombectomy? Not Applicable    Hemorrhagic change of an Ischemic Stroke: Does this patient have an ischemic stroke with hemorrhagic changes? No     Subjective:     History of Present Illness:  78 y/o male with HTN, HLD, DM2, metastatic CA (lung - mets to brain and bone), DVT (on eliquis), anemia who initially presented to the ER for generalized weakness and left hip pain.  Admitted for transfusion.  At 16:00 patient was noted to have confusion and difficulty speaking.  Stroke code was activated.  LKN was 15:00.  He was evaluated by Acute Stroke team.  He was not eligible for thrombolytic therapy due to full dose AC.  He was not felt to be IR candidate.  Today patient continues with both expressive and receptive aphasia.  Patient unable to provide history.  He was able to state his name but then perseverated on his name with additional questions.  Patient does not have history of prior stroke or TIA.                   Past Medical History:   Diagnosis Date    Arthritis     Cancer     Diabetes mellitus, type II     fasting bg criteria    Hyperlipidemia     Hypertension     Ischemic cerebrovascular accident (CVA) 11/15/2022       Past Surgical History:   Procedure  Laterality Date    TONSILLECTOMY         Family History   Problem Relation Age of Onset    Cancer Mother         unknown, met to spine    Hypertension Mother     Osteoarthritis Mother     Deep vein thrombosis Father     Heart attack Father     Pulmonary embolism Father     Emphysema Father     Arthritis Sister     No Known Problems Daughter     No Known Problems Son     Fibromyalgia Sister     Breast cancer Maternal Aunt     Heart disease Neg Hx     Prostate cancer Neg Hx     Colon cancer Neg Hx     Diabetes Neg Hx        Social History     Socioeconomic History    Marital status:    Tobacco Use    Smoking status: Never    Smokeless tobacco: Never   Substance and Sexual Activity    Alcohol use: Yes     Comment: Occasionally    Drug use: No    Sexual activity: Yes     Partners: Female   Social History Narrative    Lives in [place] with his wife. He is retired from [employer]. He is originally from [place]. 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  2 daughters with marital and family problems. One of them is  and remarried because her  has a serious anger problems. His other son-in-law is in treatment for drug and alcohol problems. He has 5 grand children. He was in the [employer]. He goes to the gym. His son loves to hunt. He bought a small [employer]     Social Determinants of Health     Financial Resource Strain: Low Risk     Difficulty of Paying Living Expenses: Not hard at all   Food Insecurity: No Food Insecurity    Worried About Running Out of Food in the Last Year: Never true    Ran Out of Food in the Last Year: Never true   Transportation Needs: No Transportation Needs    Lack of Transportation (Medical): No    Lack of Transportation (Non-Medical): No   Physical Activity: Unknown    Days of Exercise per Week: Patient refused    Minutes of Exercise per Session: Patient refused   Stress: Unknown    Feeling of Stress : Patient refused   Social Connections: Unknown    Frequency of Communication with Friends and Family:  More than three times a week    Frequency of Social Gatherings with Friends and Family: More than three times a week    Attends Mandaeism Services: Patient refused    Active Member of Clubs or Organizations: Patient refused    Attends Club or Organization Meetings: Patient refused    Marital Status:    Housing Stability: Unknown    Unable to Pay for Housing in the Last Year: No    Unstable Housing in the Last Year: No       Current Facility-Administered Medications   Medication Dose Route Frequency Provider Last Rate Last Admin    0.9%  NaCl infusion (for blood administration)   Intravenous Q24H PRN Chris Vance MD        0.9%  NaCl infusion (for blood administration)   Intravenous Q24H PRN Katie Patel, JOSE        acetaminophen tablet 650 mg  650 mg Oral Q4H PRN Katie Patel, NP        aspirin EC tablet 325 mg  325 mg Oral Once Adrian Ramos MD        aspirin EC tablet 81 mg  81 mg Oral Daily Adrian Ramos MD        atorvastatin tablet 80 mg  80 mg Oral Daily Katie Patel, NP        azithromycin 500 mg in dextrose 5 % 250 mL IVPB (ready to mix system)  500 mg Intravenous Q24H Katie Patel, NP        cefTRIAXone (ROCEPHIN) 1 g/50 mL D5W IVPB  1 g Intravenous Q24H Katie Patel, NP        dextrose 10% bolus 125 mL  12.5 g Intravenous PRN Katie Amadna, NP        dextrose 10% bolus 125 mL  12.5 g Intravenous PRN Katie Amanda, NP        dextrose 10% bolus 250 mL  25 g Intravenous PRN Katie Bridges, NP        dextrose 10% bolus 250 mL  25 g Intravenous PRN Katie Amanda, NP        glucagon (human recombinant) injection 1 mg  1 mg Intramuscular PRN Katie Amanda, NP        glucagon (human recombinant) injection 1 mg  1 mg Intramuscular PRN Katie Amanda, NP        glucose chewable tablet 16 g  16 g Oral PRN Katie Bridges, NP        glucose chewable tablet 16 g  16 g Oral PRN Katie Bridges, NP        glucose chewable tablet 24 g  24 g Oral PRN Katie Bridges, NP        glucose chewable tablet 24 g  24 g Oral PRN  "Katie Patel, JOSE        HYDROcodone-acetaminophen 5-325 mg per tablet 1 tablet  1 tablet Oral Q6H PRN Katie Patel, JOSE        hydrOXYzine pamoate capsule 25 mg  25 mg Oral Q8H PRN Katie Patel, JOSE        insulin aspart U-100 pen 0-5 Units  0-5 Units Subcutaneous QID (AC + HS) PRN Katie Patel, JOSE        losartan tablet 100 mg  100 mg Oral Daily Katie Patel, JOSE        melatonin tablet 6 mg  6 mg Oral Nightly PRN Katie Patel, JOSE        naloxone 0.4 mg/mL injection 0.02 mg  0.02 mg Intravenous PRN Katie Patel, NP        ondansetron injection 4 mg  4 mg Intravenous Q8H PRN Katie Patel, JOSE        sodium chloride 0.9% flush 10 mL  10 mL Intravenous Q12H PRN Katie Patel, NP        sodium chloride 0.9% flush 10 mL  10 mL Intravenous PRN Katie Patel, NP         Home medications reviewed    Review of patient's allergies indicates:   Allergen Reactions    Asa [aspirin]      Patient states he doesn't have allergy to medication         Review of Systems   Unable to perform ROS: Other  - Due to aphasia      Objective:     Vitals:    11/16/22 0535 11/16/22 0600 11/16/22 0758 11/16/22 0900   BP: (!) 152/67  (!) 155/69    BP Location: Left arm  Left arm    Patient Position: Lying  Lying    Pulse: 92  95    Resp: 20  18    Temp: 97.7 °F (36.5 °C)  97.8 °F (36.6 °C)    TempSrc: Oral  Oral    SpO2: (!) 90%  (!) 90% (!) 92%   Weight:  81 kg (178 lb 9.2 oz)  80.7 kg (178 lb)   Height:    6' 2" (1.88 m)         Physical Exam  Vitals reviewed.   Constitutional:       General: He is not in acute distress.  HENT:      Head: Normocephalic.      Nose: Nose normal.   Pulmonary:      Effort: Pulmonary effort is normal. No respiratory distress.   Abdominal:      General: There is no distension.      Tenderness: There is no guarding.   Musculoskeletal:      Cervical back: Normal range of motion.      Right lower leg: No edema.      Left lower leg: No edema.      Comments: Brace in place to right arm/shoulder   Skin:     General: Skin is dry. "   Neurological:      Mental Status: He is alert.   Psychiatric:         Mood and Affect: Mood normal.         Behavior: Behavior normal.     Neurological Exam  LOC: alert  Attention Span: Good   Language: Expressive aphasia, Receptive aphasia  Articulation: Dysarthria  Orientation: Untestable due to severe aphasia   Visual Fields: Blinks to threat bilaterally  EOM (CN III, IV, VI): Full/intact  Facial Movement (CN VII): Symmetric facial expression    Motor: Arm left    Full antigravity; Full power noted with nurse assistance  Leg left    Full antigravity; Full power noted with nurse assistance  Arm right  Limited evaluate due to pain and brace - can bend arm at elbow but no antigravity given; hand strength slightly less as compared to left with nurse assitance  Leg right   Full antigravity; Full power noted with nurse assistance  Cerebellum: Unable to test due to aphasia  Sensation: Localizes to painful stimuli in all 4 extremities      Diagnostic Results     Brain Imaging   11/16/2022 MRI Brain  Diffusion restriction left frontal lobe and insula.  There are smaller areas of scattered diffusion restriction in both the the right and left occipital lobe, along with a small lesion in the right parietal and right cerebellum.  Susceptibility noted within infarcted area of the left frontal lobe.  Post contrast lesions noted.     Vessel Imaging  11/15/2022 CTA head and neck  No high-grade stenosis or occlusion.    Cardiac Imaging   TTE pending        VIRTUAL TELENOTE    Chief complaint: Confusion difficulty speaking  The patient location is: [place]  Present with the patient at the time of the telemed/virtual assessment:Wife       The attending portion of this evaluation, treatment, and documentation was performed per Verenice Mukherjee NP via Telemedicine AudioVisual using the secure [employer] software platform with 2 way audio/video. The provider was located off-site and the patient is located in the hospital. The  aforementioned video software was utilized to document the relevant history and physical exam.      Verenice Mukherjee NP  [facility]  Department of Vascular Neurology   [place] [facility]

## 2022-11-16 NOTE — PLAN OF CARE
SOURAV met with pt and pts wife Cally Castellanos [phone] via [employer] to discuss dc planning.     All information confirmed information on chart. Pt resides in home with his wife. Pt is independent in ADLs but require assistance from his wife as of recent. Pt has HH with [facility]. Pt has RW, Wheelchair, hospital bed, and cane at home. Pt will be transported home by his wife upon dc. SW provided contact information for any questions or concerns. SW will continue to follow pt throughout his transitions of  care and assist with any dc needs.     SOURAV sent HH referral via [employer]    Future Appointments   Date Time Provider Department Center   11/29/2022  2:30 PM Brittanie Gonzalez MD [facility] IMPRI [place] [facility]   12/1/2022  3:00 PM Savannah Ricketts MD [facility] PULMO [place] [facility]   12/5/2022 10:00 AM LAB, [facility] [place]   12/6/2022 11:20 AM Rj Wilson MD [facility] HEM ONC [place] [facility]        11/16/22 1246   Discharge Assessment   Assessment Type Discharge Planning Assessment   Confirmed/corrected address, phone number and insurance Yes   Confirmed Demographics Correct on Facesheet   Source of Information family   Communicated ALEJANDRO with patient/caregiver Yes   Reason For Admission Anemia of chronic disease   Lives With spouse   Do you expect to return to your current living situation? Yes   Do you have help at home or someone to help you manage your care at home? Yes   Who are your caregiver(s) and their phone number(s)? Cally Castellanos (Spouse)   [phone]   Prior to hospitilization cognitive status: Alert/Oriented   Current cognitive status: Alert/Oriented   Walking or Climbing Stairs Difficulty ambulation difficulty, requires equipment   Dressing/Bathing Difficulty bathing difficulty, assistance 1 person   Home Layout Able to live on 1st floor   Equipment Currently Used at Home wheelchair;walker, rolling;cane, straight;hospital bed   Patient currently being followed by outpatient case management? No   Do you  currently have service(s) that help you manage your care at home? No   Do you take prescription medications? Yes   Do you have prescription coverage? Yes   Coverage [employer] Man[employer]   Do you have any problems affording any of your prescribed medications? No   Is the patient taking medications as prescribed? yes   Who is going to help you get home at discharge? Cally Castellanos (Spouse)   [phone]   How do you get to doctors appointments? family or friend will provide   Are you on dialysis? No   Do you take coumadin? No   Discharge Plan A Home Health   Discharge Plan B Home with family   DME Needed Upon Discharge    (TBD)   Discharge Plan discussed with: Spouse/sig other;Patient   Name(s) and Number(s) Cally Castellanos (Spouse)   [phone]   Discharge Barriers Identified None   Relationship/Environment   Name(s) of Who Lives With Patient Cally Castellanos (Spouse)   [phone]

## 2022-11-16 NOTE — ASSESSMENT & PLAN NOTE
Developed after beginning blood - patient confirmed to have suffered from ischemic stroke  -Head CT:    -No acute large vascular territory infarct or intracranial hemorrhage identified.  If persistent neurologic deficit, MRI brain can be obtained.    -Involutional change and suspected sequela minimal chronic microvascular ischemic change.  -MRI brain:  1. Multiple acute infarctions in the left frontal lobe and insular cortex, the bilateral occipital lobes, the right parietal lobe and the right cerebellar hemisphere.  Findings can be seen with embolic disease.  2. Small focus of susceptibility adjacent to the left insular cortex, highly concerning for a clot in an M3 branch.  3. Redemonstration of several small enhancing lesions concerning for early metastatic disease.  -CTA:  1. Limited examination due to poor contrast bolus timing.  No large vessel occlusion.  2. Extensive osseous metastatic disease.  There is a metastatic lesion within the C4 right lateral elements with an associated pathologic fracture of the pedicle.  No evidence of vertebral artery dissection.  3. Moderate or to large right and small left pleural effusions.  4. Partially imaged mediastinal lymphadenopathy as above.    See Acute ischemic multifocal multiple vascular territories stroke

## 2022-11-16 NOTE — ASSESSMENT & PLAN NOTE
76 y/o male with HTN, HLD, DM2, metastatic CA (lung - mets to brain and bone), DVT (on eliquis), anemia now with embolic event causing infarcts primarily to left frontal lobe and insula, along with scattered infarcts in the bilateral occipital lobe in addition to a lesion in the right parietal and right cerebellum.  CTA head an neck with no high-grade stenosis or occlusion.  TTE pending, but preliminary report does show left atrial enlargement.  Patient with recent admission for DVT and has been on apixaban.  Current etiology ESUS - could be hypercoagulable state or underlying PAF.    Antithrombotics for secondary stroke prevention: Antiplatelets: Aspirin: 81 mg daily - can discontinue when AC resumed for DVT    Statins for secondary stroke prevention and hyperlipidemia, if present:   Statins: Atorvastatin- 40 mg daily -can continue home crestor at discharge    Aggressive risk factor modification: HTN, DM, HLD     Rehab efforts: The patient has been evaluated by a stroke team provider and the therapy needs have been fully considered based off the presenting complaints and exam findings. The following therapy evaluations are needed: PT evaluate and treat, OT evaluate and treat, SLP evaluate and treat    Diagnostics ordered/pending: TTE to assess cardiac function/status     VTE prophylaxis: Heparin 5000 units SQ every 8 hours - discontinue if AC resumed    -Follow TTE - preliminary report shows left atrial enlargement - recommend 30 day event monitor to investigate for underlying PAF  -30 day event monitor with autotrigger (CV05).  Please call [phone] to notify the tech (the order does not automatically cross over).  The device will be mailed to the patient.  The company will call from a [phone] number to confirm the address.  Please share this information with the patient.  -Ambulatory referral to Vascular Neurology in 4-6 weeks (Consult Order [id number])

## 2022-11-16 NOTE — ASSESSMENT & PLAN NOTE
-Stroke risk factor  -A1c 5.7  -Goal glucose 140-180 while hospitalized  -Encourage continued compliance with diet, activity, and medications

## 2022-11-16 NOTE — SUBJECTIVE & OBJECTIVE
Interval History: Pt received PRBC, Code stroke called in the ED, MRI findings positive for embolic stroke.  Vascular neurology consulted. SLP evaluation of pt, full diet ordered.     Past Medical History:   Diagnosis Date    Arthritis     Cancer     Diabetes mellitus, type II     fasting bg criteria    Hyperlipidemia     Hypertension     Ischemic cerebrovascular accident (CVA) 11/15/2022       Past Surgical History:   Procedure Laterality Date    TONSILLECTOMY         Review of patient's allergies indicates:   Allergen Reactions    Asa [aspirin]      Patient states he doesn't have allergy to medication       Medications:  Continuous Infusions:  Scheduled Meds:   aspirin  325 mg Oral Once    aspirin  81 mg Oral Daily    atorvastatin  80 mg Oral Daily    azithromycin  500 mg Intravenous Q24H    cefTRIAXone (ROCEPHIN) IVPB  1 g Intravenous Q24H    losartan  100 mg Oral Daily     PRN Meds:sodium chloride, sodium chloride, acetaminophen, dextrose 10%, dextrose 10%, dextrose 10%, dextrose 10%, glucagon (human recombinant), glucagon (human recombinant), glucose, glucose, glucose, glucose, HYDROcodone-acetaminophen, hydrOXYzine pamoate, insulin aspart U-100, melatonin, naloxone, ondansetron, sodium chloride 0.9%, sodium chloride 0.9%    Family History       Problem Relation (Age of Onset)    Arthritis Sister    Breast cancer Maternal Aunt    Cancer Mother    Deep vein thrombosis Father    Emphysema Father    Fibromyalgia Sister    Heart attack Father    Hypertension Mother    No Known Problems Daughter, Son    Osteoarthritis Mother    Pulmonary embolism Father          Tobacco Use    Smoking status: Never    Smokeless tobacco: Never   Substance and Sexual Activity    Alcohol use: Yes     Comment: Occasionally    Drug use: No    Sexual activity: Yes     Partners: Female       Review of Systems   Unable to perform ROS: Other   Objective:     Vital Signs (Most Recent):  Temp: 98.9 °F (37.2 °C) (11/16/22 1202)  Pulse: 104  (11/16/22 1223)  Resp: 18 (11/16/22 1202)  BP: (!) 157/70 (11/16/22 1202)  SpO2: (!) 89 % (11/16/22 1202)   Vital Signs (24h Range):  Temp:  [96.3 °F (35.7 °C)-99 °F (37.2 °C)] 98.9 °F (37.2 °C)  Pulse:  [] 104  Resp:  [16-31] 18  SpO2:  [89 %-94 %] 89 %  BP: (122-157)/(57-76) 157/70     Weight: 80.7 kg (178 lb)  Body mass index is 22.85 kg/m².    Physical Exam  Constitutional:       General: He is awake.      Appearance: He is normal weight. He is ill-appearing.      Interventions: Nasal cannula in place.   HENT:      Head: Normocephalic.      Nose: Nose normal.      Mouth/Throat:      Mouth: Mucous membranes are dry.   Cardiovascular:      Rate and Rhythm: Normal rate and regular rhythm.      Pulses: Normal pulses.   Pulmonary:      Effort: Pulmonary effort is normal.      Breath sounds: Decreased breath sounds present.   Abdominal:      General: Abdomen is flat. Bowel sounds are normal.      Palpations: Abdomen is soft.   Musculoskeletal:      Comments: Splint to right arm    Skin:     General: Skin is warm.      Capillary Refill: Capillary refill takes less than 2 seconds.      Coloration: Skin is pale.   Neurological:      Mental Status: He is alert. He is confused.      Motor: Weakness present.      Comments: Pt oriented to self and wife    Psychiatric:         Attention and Perception: He is inattentive.         Speech: Speech normal.         Judgment: Judgment is impulsive.       Review of Symptoms      Symptom Assessment (ESAS 0-10 Scale)  Pain:  0  Dyspnea:  0  Anxiety:  0  Nausea:  0  Depression:  0  Anorexia:  0  Fatigue:  0  Insomnia:  0  Restlessness:  0  Agitation:  0  Unable to complete assessment due to Mental status change         Pain Assessment in Advanced Demential Scale (PAINAD)   Breathing - Independent of vocalization:  0  Negative vocalization:  0  Facial expression:  0  Body language:  0  Consolability:  1  Total:  1    Performance Status:  40    Living Arrangements:  Lives with  spouse and Lives in home    Psychosocial/Cultural: Pt lives at home with his wife.  Pt had 3 grown children.  Pt is retired.    Spiritual:  F - Inge and Belief:  Denominational   I - Importance:  Moderate   C - Community:  Family support   A - Address in Care:  Pastoral visits prn      Time-Based Charting:  Yes  Chart Review: 16 minutes  Face to Face: 18 minutes  Symptom Assessment: 9 minutes  Coordination of Care: 13 minutes  Discharge Planning: 15 minutes  Advance Care Plannin minutes  Goals of Care: 9 minutes    Total Time Spent: 89 minutes      Advance Care Planning   Advance Directives:   Living Will: Yes        Copy on chart: Yes    LaPOST: No    Do Not Resuscitate Status: No    Medical Power of : Yes    Agent's Name:  Cally Castellanos   Agent's Contact Number:  [phone]  Goals of Care: What is most important right now is to focus on spending time at home, avoiding the hospital, remaining as independent as possible, symptom/pain control, improvement in condition but with limits to invasive therapies. Accordingly, we have decided that the best plan to meet the patient's goals includes continuing with treatment.       Significant Labs: All pertinent labs within the past 24 hours have been reviewed.  CBC:   Recent Labs   Lab 22   WBC 19.63*   HGB 7.3*   HCT 22.4*   *   PLT 90*     BMP:  Recent Labs   Lab 22   *      K 3.6      CO2 20*   BUN 16   CREATININE 0.7   CALCIUM 8.7   MG 2.0     LFT:  Lab Results   Component Value Date    AST 27 11/15/2022    ALKPHOS 546 (H) 11/15/2022    BILITOT 0.7 11/15/2022     Albumin:   Albumin   Date Value Ref Range Status   11/15/2022 2.5 (L) 3.5 - 5.2 g/dL Final     Protein:   Total Protein   Date Value Ref Range Status   11/15/2022 6.6 6.0 - 8.4 g/dL Final     Lactic acid:   No results found for: LACTATE    Significant Imaging: I have reviewed all pertinent imaging results/findings within the past 24 hours.

## 2022-11-16 NOTE — PLAN OF CARE
Pt seen at bedside and appropriate for regular diet and thin liquids, will order boost for patient as well.

## 2022-11-16 NOTE — HOSPITAL COURSE
11/16: On 11/15 patient had AMS and aphasia in the ED. He went for head CT, MRI, CTA, and echo. It was revealed that patient had an ischemic stroke. Vascular stroke team consulted for evaluation. This morning patient still experiencing AMS and unable to answer orientation questions.     11/17: Patient confused and yelling for his wife during rounds this morning. He was able to speak to me and his speech was clear but he is still confused and requires a lot of redirection for simple tasks.  When asked for name and birthday he repeatedly told me his birthday but could not tell me his name.     11/18: Patient's family spoke with palliative care yesterday. It was decided that patient should be a DNR and discharge home with hospice. Patient discharging today with hospice orders and DME. Unable to perform ROS 2/2 aphasia and mental status change. Patient unable to answer any orientation questions today but did say he is thirsty and denies pain. He is able to cooperate with motor portion of neuro exam. Weakness, aphasia, and disorientation present.  PERRL and EOM NL. BLE cool with PT pulses 2+. Heart sounds NL, rate and rhythm regular, breath sounds diminished on 3 L, abdominal sounds present. Remainder of ROS is otherwise negative.

## 2022-11-16 NOTE — PROGRESS NOTES
[facility] Medicine  Progress Note    Patient Name: Joi Castellanos  MRN: [id number]  Patient Class: IP- Inpatient   Admission Date: 11/15/2022  Length of Stay: 0 days  Attending Physician: Adrian Ramos MD  Primary Care Provider: Marc Lakhani MD        Subjective:     Principal Problem:Acute ischemic multifocal multiple vascular territories stroke        HPI:  77-year-old male presented to the emergency department for evaluation of left hip pain and hypoxemia.  Patient with metastatic lung cancer w/ bone metastasis. Had recent right arm fracture, for which he is scheduled to have surgery in the near future. States he felt lightheaded earlier today and had a low pulse ox reading.  This was self-limiting.  Denies any shortness of breath during that time.  Does note persistent left hip pain for the last week or so.  Denies any recent fall or trauma to the hip.  Denies any focal numbness or weakness, loss of continence, difficulty urinating or defecating.  Does note some diarrhea recently after taking Miralax following a period of constipation.  Denies any abdominal pain, vomiting, fever, or blood in stool.  No other symptoms reported at this time.      Overview/Hospital Course:  11/16: On 11/15 patient had AMS and aphasia in the ED. He went for head CT, MRI, CTA, and echo. It was revealed that patient had an ischemic stroke. Vascular stroke team consulted for evaluation. This morning patient still experiencing AMS and unable to answer orientation questions.       No new subjective & objective note has been filed under this hospital service since the last note was generated.    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 Multiple acute infarctions in the left frontal lobe and insular cortex, the bilateral occipital lobes, the right parietal lobe and the right cerebellar hemisphere.  Findings can be seen with embolic disease.  2. Small focus  of susceptibility adjacent to the left insular cortex, highly concerning for a clot in an M3 branch.  3. Redemonstration of several small enhancing lesions concerning for early metastatic disease.    TTE 11/15:  The left ventricle is normal in size with normal systolic function.  The estimated ejection fraction is 55%.  Mild aortic regurgitation.  Mild tricuspid regurgitation.  The estimated PA systolic pressure is 53 mmHg.  Normal right ventricular size with normal right ventricular systolic function.  There is moderate pulmonary hypertension.  There is no evidence of intracardiac shunting.    CTA 11/15:  1. Limited examination due to poor contrast bolus timing.  No large vessel occlusion.  2. Extensive osseous metastatic disease.  There is a metastatic lesion within the C4 right lateral elements with an associated pathologic fracture of the pedicle.  No evidence of vertebral artery dissection.  3. Moderate or to large right and small left pleural effusions.  4. Partially imaged mediastinal lymphadenopathy as above.    Vascular neurology consulted  Patient did NOT receive tPA - not candidate  Continue ASA 81 mg  Continue Atorvastatin  Continue Heparin subq - d/c when eliquis restarted  Resume eliquis when H/H more stable                                                                                                                     Elevated troponin  Peak 0.717  Patient denies CP  EKG 11/15:  Normal sinus rhythm  Normal ECG  When compared with ECG of 31-OCT-2022 15:48,  No significant change was found      Adenocarcinoma of lung, stage 4  Following hem/onc outpatient  Worsening pleural effusion seen on CXR - pulm consulted but no thoracentesis at this time  Maintain O2 sats >90%      Malignant pleural effusion  Present on CXR  2/2 adenocarcinoma of right lung  Pulmonology consulted - no thoracentesis ordered at this time  Maintain O2 sats >90%      Leukocytosis  CXR:    -Worsening pulmonary edema pneumonia  aspiration or sepsis  WBC 19.63  Patient afebrile  Sputum cx in process  Received 1x dose azithromycin and rocephin in ED  Continue empiric abx    Encephalopathy  Developed after beginning blood - patient confirmed to have suffered from ischemic stroke  -Head CT:    -No acute large vascular territory infarct or intracranial hemorrhage identified.  If persistent neurologic deficit, MRI brain can be obtained.    -Involutional change and suspected sequela minimal chronic microvascular ischemic change.  -MRI brain:  1. Multiple acute infarctions in the left frontal lobe and insular cortex, the bilateral occipital lobes, the right parietal lobe and the right cerebellar hemisphere.  Findings can be seen with embolic disease.  2. Small focus of susceptibility adjacent to the left insular cortex, highly concerning for a clot in an M3 branch.  3. Redemonstration of several small enhancing lesions concerning for early metastatic disease.  -CTA:  1. Limited examination due to poor contrast bolus timing.  No large vessel occlusion.  2. Extensive osseous metastatic disease.  There is a metastatic lesion within the C4 right lateral elements with an associated pathologic fracture of the pedicle.  No evidence of vertebral artery dissection.  3. Moderate or to large right and small left pleural effusions.  4. Partially imaged mediastinal lymphadenopathy as above.    See Acute ischemic multifocal multiple vascular territories stroke      Non-small cell cancer of right lung  Outpatient management by Hem/Onc  Maintain O2 sats >90%  Analgesics PRN        Pleural effusion  Likely r/t non-small cell cancer of R lung  Present on CXR 11/15:  -Worsening pulmonary edema pneumonia aspiration or sepsis.  Maintain O2 sats >90%  Pulmonology consulted - no thoracentesis recommended at this time      Hilar mass  Outpatient management by Hem/Onc  Analgesics PRN  Maintain O2 >90%  Pulm consulted for pleural effusion - no thoracentesis recommended at  this time      Bone metastases  Outpatient management by Hem/Onc  Analgesics PRN  Palliative care consulted      Anxiety  Home PRN hydroxyzine      Palliative care encounter  Palliative care consulted d/t patient's stage 4 adenocarcinoma of lung, bone metastasis, and brain mets  Appreciate palliative care recommendations.      Anemia of chronic disease  H/H 6.1/19.6 -> 7.3/22.4 s/p 1u PRBCs  Monitor H/H       Fracture of right humerus  Surgery scheduled outpatient  Maintain splint and NWB  Analgesics PRN      DVT (deep venous thrombosis)  Eliquis o/h given risk of bleeding - restart when stable  Maintain SCDs  Heparin subq TID - d/c when eliquis restarted        Diabetes mellitus, type II  Patient's FSGs are controlled on current medication regimen.  Last A1c reviewed-   Lab Results   Component Value Date    HGBA1C 5.7 (H) 11/15/2022     Most recent fingerstick glucose reviewed-   Recent Labs   Lab 11/15/22  1754 11/15/22  1941 11/16/22  0535 11/16/22  1159   POCTGLUCOSE 173* 150* 148* 221*     Current correctional scale  Low  Maintain anti-hyperglycemic dose as follows-   Antihyperglycemics (From admission, onward)      Start     Stop Route Frequency Ordered    11/15/22 1812  insulin aspart U-100 pen 0-5 Units         -- SubQ Before meals & nightly PRN 11/15/22 1713          Hold Oral hypoglycemics while patient is in the hospital.    Hypertension  Continue home BP meds      Hyperlipidemia  Continue atorvastatin    VTE Risk Mitigation (From admission, onward)           Ordered     Reason for No Pharmacological VTE Prophylaxis  Once        Question:  Reasons:  Answer:  Active Bleeding    11/15/22 1409     IP VTE HIGH RISK PATIENT  Once         11/15/22 1409     Place sequential compression device  Until discontinued         11/15/22 1409                    Discharge Planning   ALEJANDRO:      Code Status: Full Code   Is the patient medically ready for discharge?:     Reason for patient still in hospital (select all that  apply): Patient trending condition and Treatment  Discharge Plan A: Home Health                  Katie Patel NP  Department of Hospital Medicine   [facility]

## 2022-11-16 NOTE — PLAN OF CARE
Problem: Adult Inpatient Plan of Care  Goal: Plan of Care Review  Outcome: Ongoing, Progressing    Care plan initiated

## 2022-11-16 NOTE — PROGRESS NOTES
"[facility]           Pharmacy  Admission Medication Reconciliation     Based on information gathered for medication list, you may go to "Admission" then "Reconcile Home Medications" tabs to review and/or act upon those items.     The home medication list has been updated by the Pharmacy department.   Please read ALL comments highlighted in red.   Please address this information as you see fit.    Feel free to contact us if you have any questions or require assistance.    Home medication list has been compared to current inpatient medications. Please review the following discrepancies noted below:    Patient reports STILL TAKING the following medication(s) which was not ordered upon admit  Vitamin D 1000 units tab    Feel free to contact us if you have any questions or require assistance.    Norman Britton, PharmD  [phone]    "

## 2022-11-16 NOTE — HPI
"Per chart, "Patient is a 77 M with PMHx of non-small cell adenocarcinoma of the right lung, HTN, HLD, and recent pathologic fracture involving the R humerus on 10/26 who presented to [facility] on 11/15 for left hip pain. Patient was recently hospitalized on 10/31 for extensive LLE DVT and partially occlusive thrombus in the R femoral vein. Imaging at that time also revealed right hilar lung mass and pleural effusion. Thoracentesis was performed and 1L of pleural fluid was removed. Cytology/pathology was positive for likely primary adenocarcinoma. Patient recently seen by heme/onc for systemic therapy which patient scheduled to start in 3 weeks. Chest xray on admission notable for right pleural effusion. Code stroke activated in the ED.   "

## 2022-11-16 NOTE — ASSESSMENT & PLAN NOTE
Peak 0.717  Patient denies CP  EKG 11/15:  Normal sinus rhythm  Normal ECG  When compared with ECG of 31-OCT-2022 15:48,  No significant change was found

## 2022-11-16 NOTE — PT/OT/SLP EVAL
Speech Language Pathology Evaluation  Bedside Swallow    Patient Name:  Joi Castellanos   MRN:  [id number]  Admitting Diagnosis: Anemia of chronic disease    Recommendations:                 Diet recommendations:  Regular, Thin   Aspiration Precautions: upright for meals, slow rate, encourage PO, boost in between meals, single straw sips, assist with feeding    General Precautions: Standard, fall  Communication strategies:  none    History per MD      Principal Problem:Anemia of chronic disease     Chief Complaint:        Chief Complaint   Patient presents with    generalized weakness    Hip Pain       Arrived via [facility] w c/o non-traumatic L hip pain and generalized weakness for several days. Dx with lung CA with mets 2 weeks ago. Family originally called EMS for low O2 sats. Pt denies ever experiencing any SOB. On arrival O2 sats 95% and in no distress         HPI: 77-year-old male presented to the emergency department for evaluation of left hip pain and hypoxemia.  Patient with metastatic lung cancer w/ bone metastasis. Had recent right arm fracture, for which he is scheduled to have surgery in the near future. States he felt lightheaded earlier today and had a low pulse ox reading.  This was self-limiting.  Denies any shortness of breath during that time.  Does note persistent left hip pain for the last week or so.  Denies any recent fall or trauma to the hip.  Denies any focal numbness or weakness, loss of continence, difficulty urinating or defecating.  Does note some diarrhea recently after taking Miralax following a period of constipation.  Denies any abdominal pain, vomiting, fever, or blood in stool.  No other symptoms reported at this time.    Past Medical History:   Diagnosis Date    Arthritis     Cancer     Diabetes mellitus, type II     fasting bg criteria    Hyperlipidemia     Hypertension     Ischemic cerebrovascular accident (CVA) 11/15/2022       Past Surgical History:   Procedure Laterality  "Date    TONSILLECTOMY         Social History: Patient lives with wife.    Prior Intubation HX:  n/a    Modified Barium Swallow: none per EMR  MRI:  Multiple acute infarctions in the left frontal lobe and insular cortex, the bilateral occipital lobes, the right parietal lobe and the right cerebellar hemisphere.  Findings can be seen with embolic disease.     Chest X-Rays: There is bilateral edema and a worsening right pleural effusion when compared to 11/02/2022.     Prior diet: reg/thin     Subjective     Pt seen this date for swallow eval. Pt awake but reporting pain all over. Pt asking for some food.   Patient goals: "pudding would be great."     Pain/Comfort:  Pain Rating 1: 8/10  Location - Side 1: Right  Location 1: arm  Pain Addressed 1: Nurse notified    Respiratory Status: room air     Objective:     Oral Musculature Evaluation  Oral Musculature: WFL  Dentition: present and adequate  Secretion Management: adequate  Mucosal Quality: good, adequate  Mandibular Strength and Mobility: WFL  Oral Labial Strength and Mobility: WFL  Lingual Strength and Mobility: WFL  Buccal Strength and Mobility: WFL  Volitional Cough: elicited  Volitional Swallow: timely swallow  Voice Prior to PO Intake: clear voice    Bedside Swallow Eval:   Consistencies Assessed:  Thin liquids water by cup and straw  Puree pudding whole container consumed  Solids virginia cracker       Oral Phase:   WFL    Pharyngeal Phase:   no overt clinical signs/symptoms of aspiration  no overt clinical signs/symptoms of pharyngeal dysphagia  multiple spontaneous swallows    Compensatory Strategies  Multiple swallows   Alternate sips and bites  Upright for meals  Single straw sips    Treatment: Discussed role of SLP, diet recs and strategies for safe swallowing. Wife indicated understanding.    Assessment:     Joi Castellanos is a 77 y.o. male admitted with Anemia of chronic disease who presents with functional swallowing skills. No s/sx of dysphagia " present.     Goals:   Multidisciplinary Problems       SLP Goals       Not on file                    Plan:     Patient to be seen:      Plan of Care expires:     Plan of Care reviewed with:  patient, caregiver   SLP Follow-Up:  No       Discharge recommendations:   (TBD)   Barriers to Discharge:  None    Time Tracking:     SLP Treatment Date:   11/16/22  Speech Start Time:  1114  Speech Stop Time:  1133     Speech Total Time (min):  19 min    Billable Minutes: Eval Swallow and Oral Function 10 and Self Care/Home Management Training 9    11/16/2022

## 2022-11-16 NOTE — ASSESSMENT & PLAN NOTE
Vascular neurology consulted  MRI 11/15:  1. Multiple acute infarctions in the left frontal lobe and insular cortex, the bilateral occipital lobes, the right parietal lobe and the right cerebellar hemisphere.  Findings can be seen with embolic disease.  2. Small focus of susceptibility adjacent to the left insular cortex, highly concerning for a clot in an M3 branch.  3. Redemonstration of several small enhancing lesions concerning for early metastatic disease.  Patient did NOT receive tPA - not candidate  TTE in process  CTA 11/15:  1. Limited examination due to poor contrast bolus timing.  No large vessel occlusion.  2. Extensive osseous metastatic disease.  There is a metastatic lesion within the C4 right lateral elements with an associated pathologic fracture of the pedicle.  No evidence of vertebral artery dissection.  3. Moderate or to large right and small left pleural effusions.  4. Partially imaged mediastinal lymphadenopathy as above.    Continue ASA 81 mg  Continue Atorvastatin  Continue Heparin subq - d/c when eliquis restarted  Resume eliquis when H/H more stable

## 2022-11-16 NOTE — ASSESSMENT & PLAN NOTE
Present on CXR  2/2 adenocarcinoma of right lung  Pulmonology consulted - no thoracentesis ordered at this time  Maintain O2 sats >90%

## 2022-11-16 NOTE — ASSESSMENT & PLAN NOTE
-Stroke risk factor  -SBP < 220 - can resume home regiment to slowly reduce BP  -Long term goal < 130/80

## 2022-11-16 NOTE — ASSESSMENT & PLAN NOTE
Outpatient management by Hem/Onc  Analgesics PRN  Maintain O2 >90%  Pulm consulted for pleural effusion - no thoracentesis recommended at this time

## 2022-11-16 NOTE — ASSESSMENT & PLAN NOTE
Following hem/onc outpatient  Worsening pleural effusion seen on CXR - pulm consulted but no thoracentesis at this time  Maintain O2 sats >90%

## 2022-11-16 NOTE — ASSESSMENT & PLAN NOTE
Likely r/t non-small cell cancer of R lung  Present on CXR 11/15:  -Worsening pulmonary edema pneumonia aspiration or sepsis.  Maintain O2 sats >90%  Pulmonology consulted - no thoracentesis recommended at this time

## 2022-11-16 NOTE — ASSESSMENT & PLAN NOTE
-Stroke risk factor  -LDL 57.0  -Atorvastatin 40mg - can resume home crestor 20mg at discharge for goal LDL < 70

## 2022-11-16 NOTE — PLAN OF CARE
Care Update  I was called by the patient's nurse to report a positive MRI-brain with questions of whether or not the patient requires transferred to [facility].  I reviewed the patient's chart and called the transfer center to speak with Vascular Neurology, Dr. Aida Portillo, who also reviewed the chart.  Given the embolic nature of the strokes and no large vessel occlusion she did not feel that the patient required transferred to [facility] for higher level of care.  She did recommend that we obtain an echocardiogram, lipid panel, start atorvastatin, and place a consult for inpatient Vascular Neurology Telemedicine service who will see the patient virtually tomorrow.    I have updated the patient's nurse.        Malika Velez M.D.        N.B.: Portions of this note was dictated using M*Modal Fluency Direct--there may be voice recognition errors occasionally missed on review.

## 2022-11-16 NOTE — ASSESSMENT & PLAN NOTE
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  Pt is well know to this palliative team.    - Pt resting in bed.  Wife, Cally, at the bedside. Pt is oriented to self and wife.  He is unsure his location or year.  On all prior encounters, pt was Ax3 with mentation completely intact. Today, pt will follow simple commands and responds conversationally with mainly appropriate responses. Pt required redirection multiple times throughout the visit.   -  Pts wife reports that they met with Dr Wilson on Monday, and the pt is supposed to begin cancer treatments in 3 weeks.  Pt is also scheduled for orthopedic surgery next week to repair R humerus.    -Pt is well know to this palliative team as he was seen on last admission as well as in the palliative outpatient clinic on 11/8/2022.  Goals of care have been elaborately discussed with the pt and the pt and wife have clear goals.  Per last office visit, "Completed an MPOA designating his spouse Cally and an LW indicating that in the event of terminal incapacity he would NOT accept long-term life support and would wish for comfort measures only without artificial fluids and nutrition. Otherwise pt has requested no limitations on care.  Previously, the pt has expressed that he will accept all treatment options that will help him fight this cancer and his extend his life up until the point of terminal incapacity.  At that point he would want comfort based care.   - Pts wife verbally expresses that the pt suffered a stroke and expresses fear that this will change all treatment options and the pts quality of life.  Emotional support offered.  Pts wife is understandably emotional and is awaiting updates from the pts attending physician and a visit from neurology so she can better " understand her husbands prognosis and relay information to their children.  Wife is fearful to leave the pts side, but acknowledges that she has not had any real sleep in the past few days and herself has a Drs appt tomorrow that she can not miss.  Discussed safety options for the pt ( bed alarms, video sitters) and discussed the possibility of her children coming to offer the wife some relief.      At this time, the goals of care for the pt have not changed, all aggressive treatment options are requested.  Pts wife acknowledges that depending on how the pt progresses over the next few days the family may need to reexamine the goals of care.  Pt remains a full code.

## 2022-11-16 NOTE — ASSESSMENT & PLAN NOTE
MRI 11/15:  1. Multiple acute infarctions in the left frontal lobe and insular cortex, the bilateral occipital lobes, the right parietal lobe and the right cerebellar hemisphere.  Findings can be seen with embolic disease.  2. Small focus of susceptibility adjacent to the left insular cortex, highly concerning for a clot in an M3 branch.  3. Redemonstration of several small enhancing lesions concerning for early metastatic disease.    TTE 11/15:  The left ventricle is normal in size with normal systolic function.  The estimated ejection fraction is 55%.  Mild aortic regurgitation.  Mild tricuspid regurgitation.  The estimated PA systolic pressure is 53 mmHg.  Normal right ventricular size with normal right ventricular systolic function.  There is moderate pulmonary hypertension.  There is no evidence of intracardiac shunting.    CTA 11/15:  1. Limited examination due to poor contrast bolus timing.  No large vessel occlusion.  2. Extensive osseous metastatic disease.  There is a metastatic lesion within the C4 right lateral elements with an associated pathologic fracture of the pedicle.  No evidence of vertebral artery dissection.  3. Moderate or to large right and small left pleural effusions.  4. Partially imaged mediastinal lymphadenopathy as above.    Vascular neurology consulted  Patient did NOT receive tPA - not candidate  ASA o/h given plt count of 29  Continue Atorvastatin  Continue Heparin subq - d/c when eliquis restarted  Resume eliquis when H/H more stable

## 2022-11-16 NOTE — ASSESSMENT & PLAN NOTE
Patient's FSGs are controlled on current medication regimen.  Last A1c reviewed-   Lab Results   Component Value Date    HGBA1C 5.7 (H) 11/15/2022     Most recent fingerstick glucose reviewed-   Recent Labs   Lab 11/15/22  1754 11/15/22  1941 11/16/22  0535 11/16/22  1159   POCTGLUCOSE 173* 150* 148* 221*     Current correctional scale  Low  Maintain anti-hyperglycemic dose as follows-   Antihyperglycemics (From admission, onward)    Start     Stop Route Frequency Ordered    11/15/22 1812  insulin aspart U-100 pen 0-5 Units         -- SubQ Before meals & nightly PRN 11/15/22 1713        Hold Oral hypoglycemics while patient is in the hospital.

## 2022-11-16 NOTE — PROGRESS NOTES
VN cued into room to complete admit assessment. VIP model introduced; VN working alongside bedside treatment team.  Plan of care reviewed with patient. Patient informed of fall risk, fall precautions, call light within reach, side rails x3 elevated. Patient notified to ask staff for assistance. Patient verbalized complete understanding. Time allowed for questions. Will continue to monitor and intervene as needed.            11/15/22 1849   Admission   Initial VN Admission Questions Complete   Communication Issues? None   Shift   Virtual Nurse - Patient Verbalized Approval Of Camera Use   Safety/Activity   Patient Rounds bed in low position;call light in patient/parent reach;clutter free environment maintained;visualized patient;bed wheels locked   Safety Promotion/Fall Prevention side rails raised x 3   Positioning   Body Position supine   Head of Bed (HOB) Positioning HOB elevated

## 2022-11-16 NOTE — CONSULTS
"[facility] - Telemetry  Palliative Medicine  Consult Note    Patient Name: Joi Castellanos  MRN: [id number]  Admission Date: 11/15/2022  Hospital Length of Stay: 0 days  Code Status: Full Code   Attending Provider: Adrian Ramos MD  Consulting Provider: Jerri Zamora NP  Primary Care Physician: Marc Lakhani MD  Principal Problem:Anemia of chronic disease    Patient information was obtained from patient, spouse/SO, past medical records and primary team.      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  Pt is well know to this palliative team.    - Pt resting in bed.  Wife, Cally, at the bedside. Pt is oriented to self and wife.  He is unsure his location or year.  On all prior encounters, pt was Ax3 with mentation completely intact. Today, pt will follow simple commands and responds conversationally with mainly appropriate responses. Pt required redirection multiple times throughout the visit.   -  Pts wife reports that they met with Dr Wilson on Monday, and the pt is supposed to begin cancer treatments in 3 weeks.  Pt is also scheduled for orthopedic surgery next week to repair R humerus.    -Pt is well know to this palliative team as he was seen on last admission as well as in the palliative outpatient clinic on 11/8/2022.  Goals of care have been elaborately discussed with the pt and the pt and wife have clear goals.  Per last office visit, "Completed an MPOA designating his spouse Cally and an LW indicating that in the event of terminal incapacity he would NOT accept long-term life " "support and would wish for comfort measures only without artificial fluids and nutrition. Otherwise pt has requested no limitations on care.  Previously, the pt has expressed that he will accept all treatment options that will help him fight this cancer and his extend his life up until the point of terminal incapacity.  At that point he would want comfort based care.   - Pts wife verbally expresses that the pt suffered a stroke and expresses fear that this will change all treatment options and the pts quality of life.  Emotional support offered.  Pts wife is understandably emotional and is awaiting updates from the pts attending physician and a visit from neurology so she can better understand her husbands prognosis and relay information to their children.  Wife is fearful to leave the pts side, but acknowledges that she has not had any real sleep in the past few days and herself has a Drs appt tomorrow that she can not miss.  Discussed safety options for the pt ( bed alarms, video sitters) and discussed the possibility of her children coming to offer the wife some relief.      At this time, the goals of care for the pt have not changed, all aggressive treatment options are requested.  Pts wife acknowledges that depending on how the pt progresses over the next few days the family may need to reexamine the goals of care.  Pt remains a full code.    Will continue to discuss goals of care with pts wife and attempt to include children who will be present at bedside at some point tomorrow.        Subjective:     HPI:   Per chart, "Patient is a 77 M with PMHx of non-small cell adenocarcinoma of the right lung, HTN, HLD, and recent pathologic fracture involving the R humerus on 10/26 who presented to [facility] on 11/15 for left hip pain. Patient was recently hospitalized on 10/31 for extensive LLE DVT and partially occlusive thrombus in the R femoral vein. Imaging at that time also revealed right hilar lung mass and " pleural effusion. Thoracentesis was performed and 1L of pleural fluid was removed. Cytology/pathology was positive for likely primary adenocarcinoma. Patient recently seen by heme/onc for systemic therapy which patient scheduled to start in 3 weeks. Chest xray on admission notable for right pleural effusion. Code stroke activated in the ED.     Interval History: Pt received PRBC, Code stroke called in the ED, MRI findings positive for embolic stroke.  Vascular neurology consulted. SLP evaluation of pt, full diet ordered.     Past Medical History:   Diagnosis Date    Arthritis     Cancer     Diabetes mellitus, type II     fasting bg criteria    Hyperlipidemia     Hypertension     Ischemic cerebrovascular accident (CVA) 11/15/2022       Past Surgical History:   Procedure Laterality Date    TONSILLECTOMY         Review of patient's allergies indicates:   Allergen Reactions    Asa [aspirin]      Patient states he doesn't have allergy to medication       Medications:  Continuous Infusions:  Scheduled Meds:   aspirin  325 mg Oral Once    aspirin  81 mg Oral Daily    atorvastatin  80 mg Oral Daily    azithromycin  500 mg Intravenous Q24H    cefTRIAXone (ROCEPHIN) IVPB  1 g Intravenous Q24H    losartan  100 mg Oral Daily     PRN Meds:sodium chloride, sodium chloride, acetaminophen, dextrose 10%, dextrose 10%, dextrose 10%, dextrose 10%, glucagon (human recombinant), glucagon (human recombinant), glucose, glucose, glucose, glucose, HYDROcodone-acetaminophen, hydrOXYzine pamoate, insulin aspart U-100, melatonin, naloxone, ondansetron, sodium chloride 0.9%, sodium chloride 0.9%    Family History       Problem Relation (Age of Onset)    Arthritis Sister    Breast cancer Maternal Aunt    Cancer Mother    Deep vein thrombosis Father    Emphysema Father    Fibromyalgia Sister    Heart attack Father    Hypertension Mother    No Known Problems Daughter, Son    Osteoarthritis Mother    Pulmonary embolism Father           Tobacco Use    Smoking status: Never    Smokeless tobacco: Never   Substance and Sexual Activity    Alcohol use: Yes     Comment: Occasionally    Drug use: No    Sexual activity: Yes     Partners: Female       Review of Systems   Unable to perform ROS: Other   Objective:     Vital Signs (Most Recent):  Temp: 98.9 °F (37.2 °C) (11/16/22 1202)  Pulse: 104 (11/16/22 1223)  Resp: 18 (11/16/22 1202)  BP: (!) 157/70 (11/16/22 1202)  SpO2: (!) 89 % (11/16/22 1202)   Vital Signs (24h Range):  Temp:  [96.3 °F (35.7 °C)-99 °F (37.2 °C)] 98.9 °F (37.2 °C)  Pulse:  [] 104  Resp:  [16-31] 18  SpO2:  [89 %-94 %] 89 %  BP: (122-157)/(57-76) 157/70     Weight: 80.7 kg (178 lb)  Body mass index is 22.85 kg/m².    Physical Exam  Constitutional:       General: He is awake.      Appearance: He is normal weight. He is ill-appearing.      Interventions: Nasal cannula in place.   HENT:      Head: Normocephalic.      Nose: Nose normal.      Mouth/Throat:      Mouth: Mucous membranes are dry.   Cardiovascular:      Rate and Rhythm: Normal rate and regular rhythm.      Pulses: Normal pulses.   Pulmonary:      Effort: Pulmonary effort is normal.      Breath sounds: Decreased breath sounds present.   Abdominal:      General: Abdomen is flat. Bowel sounds are normal.      Palpations: Abdomen is soft.   Musculoskeletal:      Comments: Splint to right arm    Skin:     General: Skin is warm.      Capillary Refill: Capillary refill takes less than 2 seconds.      Coloration: Skin is pale.   Neurological:      Mental Status: He is alert. He is confused.      Motor: Weakness present.      Comments: Pt oriented to self and wife    Psychiatric:         Attention and Perception: He is inattentive.         Speech: Speech normal.         Judgment: Judgment is impulsive.       Review of Symptoms      Symptom Assessment (ESAS 0-10 Scale)  Pain:  0  Dyspnea:  0  Anxiety:  0  Nausea:  0  Depression:  0  Anorexia:  0  Fatigue:  0  Insomnia:   0  Restlessness:  0  Agitation:  0  Unable to complete assessment due to Mental status change         Pain Assessment in Advanced Demential Scale (PAINAD)   Breathing - Independent of vocalization:  0  Negative vocalization:  0  Facial expression:  0  Body language:  0  Consolability:  1  Total:  1    Performance Status:  40    Living Arrangements:  Lives with spouse and Lives in home    Psychosocial/Cultural: Pt lives at home with his wife.  Pt had 3 grown children.  Pt is retired.    Spiritual:  F - Inge and Belief:  Amish   I - Importance:  Moderate   C - Community:  Family support   A - Address in Care:  Pastoral visits prn      Time-Based Charting:  Yes  Chart Review: 16 minutes  Face to Face: 18 minutes  Symptom Assessment: 9 minutes  Coordination of Care: 13 minutes  Discharge Planning: 15 minutes  Advance Care Plannin minutes  Goals of Care: 9 minutes    Total Time Spent: 89 minutes      Advance Care Planning   Advance Directives:   Living Will: Yes        Copy on chart: Yes    [place]: No    Do Not Resuscitate Status: No    Medical Power of : Yes    Agent's Name:  Cally Castellanos   Agent's Contact Number:  [phone]  Goals of Care: What is most important right now is to focus on spending time at home, avoiding the hospital, remaining as independent as possible, symptom/pain control, improvement in condition but with limits to invasive therapies. Accordingly, we have decided that the best plan to meet the patient's goals includes continuing with treatment.       Significant Labs: All pertinent labs within the past 24 hours have been reviewed.  CBC:   Recent Labs   Lab 22  030   WBC 19.63*   HGB 7.3*   HCT 22.4*   *   PLT 90*     BMP:  Recent Labs   Lab 22  030   *      K 3.6      CO2 20*   BUN 16   CREATININE 0.7   CALCIUM 8.7   MG 2.0     LFT:  Lab Results   Component Value Date    AST 27 11/15/2022    ALKPHOS 546 (H) 11/15/2022    BILITOT 0.7  11/15/2022     Albumin:   Albumin   Date Value Ref Range Status   11/15/2022 2.5 (L) 3.5 - 5.2 g/dL Final     Protein:   Total Protein   Date Value Ref Range Status   11/15/2022 6.6 6.0 - 8.4 g/dL Final     Lactic acid:   No results found for: LACTATE    Significant Imaging: I have reviewed all pertinent imaging results/findings within the past 24 hours.        Jerri Zamora NP  Palliative Medicine  [facility]    Advance Care Planning     Date: 11/16/2022    [facility]  I engaged the healthcare power of   in a conversation about advance care planning and we specifically addressed what the goals of care would be moving forward, in light of the patient's change in clinical status, specifically lung cancer with mets, recent CVA.  We did not specifically address the patient's likely prognosis, which is poor.  We explored the patient's values and preferences for future care.  The healthcare power of   endorses that what is most important right now is to focus on remaining as independent as possible, symptom/pain control, extending life as long as possible, even it it means sacrificing quality and improvement in condition but with limits to invasive therapies    Accordingly, we have decided that the best plan to meet the patient's goals includes continuing with treatment    I did not explain the role for hospice care at this stage of the patient's illness, including its ability to help the patient live with the best quality of life possible.  We will be making a hospice referral.    I spent a total of 18 minutes engaging the patient in this advance care planning discussion.

## 2022-11-16 NOTE — ASSESSMENT & PLAN NOTE
Eliquis o/h given risk of bleeding - restart when stable  Maintain SCDs  Heparin subq TID - d/c when eliquis restarted

## 2022-11-16 NOTE — ASSESSMENT & PLAN NOTE
Palliative care consulted d/t patient's stage 4 adenocarcinoma of lung, bone metastasis, and brain mets  Appreciate palliative care recommendations.     [Time Spent: ___ minutes] : I have spent [unfilled] minutes of time on the encounter. [>50% of the face to face encounter time was spent on counseling and/or coordination of care for ___] : Greater than 50% of the face to face encounter time was spent on counseling and/or coordination of care for [unfilled]

## 2022-11-16 NOTE — ASSESSMENT & PLAN NOTE
CXR:    -Worsening pulmonary edema pneumonia aspiration or sepsis  WBC 19.63  Patient afebrile  Sputum cx in process  Received 1x dose azithromycin and rocephin in ED  Continue empiric abx

## 2022-11-17 ENCOUNTER — EXTERNAL HOME HEALTH (OUTPATIENT)
Dept: HOME HEALTH SERVICES | Facility: HOSPITAL | Age: 77
End: 2022-11-17
Payer: MEDICARE

## 2022-11-17 PROBLEM — D69.6 THROMBOCYTOPENIA: Status: ACTIVE | Noted: 2022-11-17

## 2022-11-17 LAB
ANION GAP SERPL CALC-SCNC: 15 MMOL/L (ref 8–16)
ANISOCYTOSIS BLD QL SMEAR: SLIGHT
BACTERIA #/AREA URNS HPF: NORMAL /HPF
BASOPHILS # BLD AUTO: ABNORMAL K/UL (ref 0–0.2)
BASOPHILS NFR BLD: 0 % (ref 0–1.9)
BILIRUB UR QL STRIP: NEGATIVE
BUN SERPL-MCNC: 18 MG/DL (ref 8–23)
CALCIUM SERPL-MCNC: 8.5 MG/DL (ref 8.7–10.5)
CHLORIDE SERPL-SCNC: 113 MMOL/L (ref 95–110)
CLARITY UR: ABNORMAL
CO2 SERPL-SCNC: 18 MMOL/L (ref 23–29)
COLOR UR: YELLOW
CREAT SERPL-MCNC: 0.6 MG/DL (ref 0.5–1.4)
DACRYOCYTES BLD QL SMEAR: ABNORMAL
DIFFERENTIAL METHOD: ABNORMAL
EOSINOPHIL # BLD AUTO: ABNORMAL K/UL (ref 0–0.5)
EOSINOPHIL NFR BLD: 0 % (ref 0–8)
ERYTHROCYTE [DISTWIDTH] IN BLOOD BY AUTOMATED COUNT: 18.7 % (ref 11.5–14.5)
EST. GFR  (NO RACE VARIABLE): >60 ML/MIN/1.73 M^2
GLUCOSE SERPL-MCNC: 152 MG/DL (ref 70–110)
GLUCOSE UR QL STRIP: NEGATIVE
HCT VFR BLD AUTO: 22 % (ref 40–54)
HGB BLD-MCNC: 7.1 G/DL (ref 14–18)
HGB UR QL STRIP: NEGATIVE
HYALINE CASTS #/AREA URNS LPF: 0 /LPF
HYPOCHROMIA BLD QL SMEAR: ABNORMAL
IMM GRANULOCYTES # BLD AUTO: ABNORMAL K/UL (ref 0–0.04)
IMM GRANULOCYTES NFR BLD AUTO: ABNORMAL % (ref 0–0.5)
KETONES UR QL STRIP: NEGATIVE
LEUKOCYTE ESTERASE UR QL STRIP: NEGATIVE
LYMPHOCYTES # BLD AUTO: ABNORMAL K/UL (ref 1–4.8)
LYMPHOCYTES NFR BLD: 8 % (ref 18–48)
MAGNESIUM SERPL-MCNC: 1.9 MG/DL (ref 1.6–2.6)
MCH RBC QN AUTO: 32.1 PG (ref 27–31)
MCHC RBC AUTO-ENTMCNC: 32.3 G/DL (ref 32–36)
MCV RBC AUTO: 100 FL (ref 82–98)
METAMYELOCYTES NFR BLD MANUAL: 1 %
MICROSCOPIC COMMENT: NORMAL
MONOCYTES # BLD AUTO: ABNORMAL K/UL (ref 0.3–1)
MONOCYTES NFR BLD: 2 % (ref 4–15)
NEUTROPHILS NFR BLD: 86 % (ref 38–73)
NEUTS BAND NFR BLD MANUAL: 3 %
NITRITE UR QL STRIP: NEGATIVE
NRBC BLD-RTO: 0 /100 WBC
OVALOCYTES BLD QL SMEAR: ABNORMAL
PH UR STRIP: 6 [PH] (ref 5–8)
PLATELET # BLD AUTO: 29 K/UL (ref 150–450)
PLATELET BLD QL SMEAR: ABNORMAL
PMV BLD AUTO: 10.5 FL (ref 9.2–12.9)
POCT GLUCOSE: 161 MG/DL (ref 70–110)
POCT GLUCOSE: 162 MG/DL (ref 70–110)
POCT GLUCOSE: 176 MG/DL (ref 70–110)
POCT GLUCOSE: 196 MG/DL (ref 70–110)
POIKILOCYTOSIS BLD QL SMEAR: SLIGHT
POLYCHROMASIA BLD QL SMEAR: ABNORMAL
POTASSIUM SERPL-SCNC: 3.7 MMOL/L (ref 3.5–5.1)
PROT UR QL STRIP: ABNORMAL
RBC # BLD AUTO: 2.21 M/UL (ref 4.6–6.2)
RBC #/AREA URNS HPF: 0 /HPF (ref 0–4)
SODIUM SERPL-SCNC: 146 MMOL/L (ref 136–145)
SP GR UR STRIP: 1.02 (ref 1–1.03)
URN SPEC COLLECT METH UR: ABNORMAL
UROBILINOGEN UR STRIP-ACNC: ABNORMAL EU/DL
WBC # BLD AUTO: 16.21 K/UL (ref 3.9–12.7)
WBC #/AREA URNS HPF: 0 /HPF (ref 0–5)

## 2022-11-17 PROCEDURE — 1158F PR ADVANCE CARE PLANNING DISCUSS DOCUMENTED IN MEDICAL RECORD: ICD-10-PCS | Mod: CPTII,,, | Performed by: STUDENT IN AN ORGANIZED HEALTH CARE EDUCATION/TRAINING PROGRAM

## 2022-11-17 PROCEDURE — 1152F DOC ADVNCD DIS COMFORT 1ST: CPT | Mod: CPTII,,, | Performed by: STUDENT IN AN ORGANIZED HEALTH CARE EDUCATION/TRAINING PROGRAM

## 2022-11-17 PROCEDURE — 97165 OT EVAL LOW COMPLEX 30 MIN: CPT

## 2022-11-17 PROCEDURE — 85007 BL SMEAR W/DIFF WBC COUNT: CPT | Performed by: NURSE PRACTITIONER

## 2022-11-17 PROCEDURE — 97530 THERAPEUTIC ACTIVITIES: CPT

## 2022-11-17 PROCEDURE — 99233 PR SUBSEQUENT HOSPITAL CARE,LEVL III: ICD-10-PCS | Mod: ,,,

## 2022-11-17 PROCEDURE — 97166 OT EVAL MOD COMPLEX 45 MIN: CPT

## 2022-11-17 PROCEDURE — 99497 PR ADVNCD CARE PLAN 30 MIN: ICD-10-PCS | Mod: ,,, | Performed by: STUDENT IN AN ORGANIZED HEALTH CARE EDUCATION/TRAINING PROGRAM

## 2022-11-17 PROCEDURE — 83735 ASSAY OF MAGNESIUM: CPT | Performed by: NURSE PRACTITIONER

## 2022-11-17 PROCEDURE — 97162 PT EVAL MOD COMPLEX 30 MIN: CPT

## 2022-11-17 PROCEDURE — 11000001 HC ACUTE MED/SURG PRIVATE ROOM

## 2022-11-17 PROCEDURE — 85027 COMPLETE CBC AUTOMATED: CPT | Performed by: NURSE PRACTITIONER

## 2022-11-17 PROCEDURE — 1152F PR DOC ADVANCED DISEASE DX, GOAL OF CARE PRIORITIZE COMFORT: ICD-10-PCS | Mod: CPTII,,, | Performed by: STUDENT IN AN ORGANIZED HEALTH CARE EDUCATION/TRAINING PROGRAM

## 2022-11-17 PROCEDURE — 36415 COLL VENOUS BLD VENIPUNCTURE: CPT | Performed by: NURSE PRACTITIONER

## 2022-11-17 PROCEDURE — 27000221 HC OXYGEN, UP TO 24 HOURS

## 2022-11-17 PROCEDURE — 99223 1ST HOSP IP/OBS HIGH 75: CPT | Mod: ,,, | Performed by: INTERNAL MEDICINE

## 2022-11-17 PROCEDURE — 94761 N-INVAS EAR/PLS OXIMETRY MLT: CPT

## 2022-11-17 PROCEDURE — 99233 SBSQ HOSP IP/OBS HIGH 50: CPT | Mod: ,,,

## 2022-11-17 PROCEDURE — 99900035 HC TECH TIME PER 15 MIN (STAT)

## 2022-11-17 PROCEDURE — 99497 ADVNCD CARE PLAN 30 MIN: CPT | Mod: ,,, | Performed by: STUDENT IN AN ORGANIZED HEALTH CARE EDUCATION/TRAINING PROGRAM

## 2022-11-17 PROCEDURE — 63600175 PHARM REV CODE 636 W HCPCS: Performed by: NURSE PRACTITIONER

## 2022-11-17 PROCEDURE — 81000 URINALYSIS NONAUTO W/SCOPE: CPT | Performed by: EMERGENCY MEDICINE

## 2022-11-17 PROCEDURE — 80048 BASIC METABOLIC PNL TOTAL CA: CPT | Performed by: NURSE PRACTITIONER

## 2022-11-17 PROCEDURE — 25000003 PHARM REV CODE 250: Performed by: NURSE PRACTITIONER

## 2022-11-17 PROCEDURE — 1158F ADVNC CARE PLAN TLK DOCD: CPT | Mod: CPTII,,, | Performed by: STUDENT IN AN ORGANIZED HEALTH CARE EDUCATION/TRAINING PROGRAM

## 2022-11-17 PROCEDURE — 99223 PR INITIAL HOSPITAL CARE,LEVL III: ICD-10-PCS | Mod: ,,, | Performed by: INTERNAL MEDICINE

## 2022-11-17 RX ORDER — POTASSIUM CHLORIDE 20 MEQ/1
20 TABLET, EXTENDED RELEASE ORAL ONCE
Status: COMPLETED | OUTPATIENT
Start: 2022-11-17 | End: 2022-11-17

## 2022-11-17 RX ADMIN — POTASSIUM CHLORIDE 20 MEQ: 1500 TABLET, EXTENDED RELEASE ORAL at 03:11

## 2022-11-17 RX ADMIN — AZITHROMYCIN MONOHYDRATE 500 MG: 500 INJECTION, POWDER, LYOPHILIZED, FOR SOLUTION INTRAVENOUS at 01:11

## 2022-11-17 RX ADMIN — CEFTRIAXONE 1 G: 1 INJECTION, SOLUTION INTRAVENOUS at 01:11

## 2022-11-17 RX ADMIN — LOSARTAN POTASSIUM 100 MG: 50 TABLET, FILM COATED ORAL at 09:11

## 2022-11-17 RX ADMIN — ATORVASTATIN CALCIUM 80 MG: 40 TABLET, FILM COATED ORAL at 09:11

## 2022-11-17 NOTE — PROGRESS NOTES
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  Patient with metastatic lung cancer w/ bone metastasis. Had recent right arm fracture, for which he is scheduled to have surgery in the near future. States he felt lightheaded earlier today and had a low pulse ox reading.  This was self-limiting.  Denies any shortness of breath during that time.  Does note persistent left hip pain for the last week or so.  Denies any recent fall or trauma to the hip.  Denies any focal numbness or weakness, loss of continence, difficulty urinating or defecating.  Does note some diarrhea recently after taking Miralax following a period of constipation.  Denies any abdominal pain, vomiting, fever, or blood in stool.  No other symptoms reported at this time.      Overview/Hospital Course:  11/16: On 11/15 patient had AMS and aphasia in the ED. He went for head CT, MRI, CTA, and echo. It was revealed that patient had an ischemic stroke. Vascular stroke team consulted for evaluation. This morning patient still experiencing AMS and unable to answer orientation questions.     11/17: Patient confused and yelling for his wife during rounds this morning. He was able to speak to me and his speech was clear but he is still confused and requires a lot of redirection for simple tasks.  When asked for name and birthday he repeatedly told me his birthday but could not tell me his name.       No new subjective & objective note has been filed under this hospital service since the last note was  generated.    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 Multiple acute infarctions in the left frontal lobe and insular cortex, the bilateral occipital lobes, the right parietal lobe and the right cerebellar hemisphere.  Findings can be seen with embolic disease.  2. Small focus of susceptibility adjacent to the left insular cortex, highly concerning for a clot in an M3 branch.  3. Redemonstration of several small enhancing lesions concerning for early metastatic disease.    TTE 11/15:  The left ventricle is normal in size with normal systolic function.  The estimated ejection fraction is 55%.  Mild aortic regurgitation.  Mild tricuspid regurgitation.  The estimated PA systolic pressure is 53 mmHg.  Normal right ventricular size with normal right ventricular systolic function.  There is moderate pulmonary hypertension.  There is no evidence of intracardiac shunting.    CTA 11/15:  1. Limited examination due to poor contrast bolus timing.  No large vessel occlusion.  2. Extensive osseous metastatic disease.  There is a metastatic lesion within the C4 right lateral elements with an associated pathologic fracture of the pedicle.  No evidence of vertebral artery dissection.  3. Moderate or to large right and small left pleural effusions.  4. Partially imaged mediastinal lymphadenopathy as above.    Patient did NOT receive tPA - not candidate  ASA o/h given plt count of 29  Continue Atorvastatin  Continue Heparin subq - d/c when eliquis restarted  Resume eliquis when H/H more stable    Vascular neurology consulted  -Follow TTE - preliminary report shows left atrial enlargement - recommend 30 day event monitor to investigate for underlying PAF  -30 day event monitor with autotrigger (CV05).  Please call [phone] to notify the tech (the order does not automatically cross over).  The device will be mailed to the patient.  The company will call from a 274 number to  confirm the address.  Please share this information with the patient.  -Ambulatory referral to Vascular Neurology in 4-6 weeks (Consult Order [id number])                                                                                                                       Thrombocytopenia  Plt 26  ASA o/h  Hem/onc consulted  Monitor for s/s of bleeding  Occult stool pending    Elevated troponin  Peak 0.717  EKG 11/15:  Normal sinus rhythm  Normal ECG  When compared with ECG of 31-OCT-2022 15:48,  No significant change was found      Adenocarcinoma of lung, stage 4  Following hem/onc outpatient  Worsening pleural effusion seen on CXR - pulm consulted but no thoracentesis at this time  Maintain O2 sats >90%      Malignant pleural effusion  Present on CXR  2/2 adenocarcinoma of right lung  Pulmonology consulted - no thoracentesis ordered at this time  Maintain O2 sats >90%      Leukocytosis  CXR:    -Worsening pulmonary edema pneumonia aspiration or sepsis  WBC 16.21  Patient afebrile  Sputum cx pending  Received 1x dose azithromycin and rocephin in ED  Continue empiric CAP coverage    Encephalopathy  Developed after beginning blood - patient confirmed to have suffered from ischemic stroke  -Head CT:    -No acute large vascular territory infarct or intracranial hemorrhage identified.  If persistent neurologic deficit, MRI brain can be obtained.    -Involutional change and suspected sequela minimal chronic microvascular ischemic change.  -MRI brain:  1. Multiple acute infarctions in the left frontal lobe and insular cortex, the bilateral occipital lobes, the right parietal lobe and the right cerebellar hemisphere.  Findings can be seen with embolic disease.  2. Small focus of susceptibility adjacent to the left insular cortex, highly concerning for a clot in an M3 branch.  3. Redemonstration of several small enhancing lesions concerning for early metastatic disease.  -CTA:  1. Limited examination due to poor contrast bolus  timing.  No large vessel occlusion.  2. Extensive osseous metastatic disease.  There is a metastatic lesion within the C4 right lateral elements with an associated pathologic fracture of the pedicle.  No evidence of vertebral artery dissection.  3. Moderate or to large right and small left pleural effusions.  4. Partially imaged mediastinal lymphadenopathy as above.    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  Maintain O2 sats >90%  Pulmonology consulted - no thoracentesis recommended at this time      Hilar mass  Outpatient management by Hem/Onc  Analgesics PRN  Maintain O2 >90%  Pulm consulted for pleural effusion - no thoracentesis recommended at this time      Bone metastases  Outpatient management by Hem/Onc  Analgesics PRN  Palliative care following      Anxiety  Home PRN hydroxyzine      Palliative care encounter  Palliative care consulted d/t patient's stage 4 adenocarcinoma of lung, bone metastasis, and brain mets  Appreciate palliative care recommendations.      Anemia of chronic disease  H/H 6.1/19.6 -> 7.3/22.4 s/p 1u PRBCs -> 7.1/22.0  Transfuse for hemoglobin < 7  Monitor H/H       Fracture of right humerus  Surgery scheduled outpatient  Maintain splint and NWB  Analgesics PRN      DVT (deep venous thrombosis)  Eliquis o/h given risk of bleeding - restart when stable  Maintain SCDs  Heparin subq TID - d/c when eliquis restarted   Monitor H/H  Monitor for signs of bleeding        Diabetes mellitus, type II  Patient's FSGs are controlled on current medication regimen.  Last A1c reviewed-   Lab Results   Component Value Date    HGBA1C 5.7  (H) 11/15/2022     Most recent fingerstick glucose reviewed-   Recent Labs   Lab 11/16/22  1627 11/16/22 2024 11/17/22  0532 11/17/22  1122   POCTGLUCOSE 163* 197* 161* 176*     Current correctional scale  Low  Maintain anti-hyperglycemic dose as follows-   Antihyperglycemics (From admission, onward)      Start     Stop Route Frequency Ordered    11/15/22 1812  insulin aspart U-100 pen 0-5 Units         -- SubQ Before meals & nightly PRN 11/15/22 1713          Hold Oral hypoglycemics while patient is in the hospital.    Hypertension  Continue home BP meds  Monitor      Hyperlipidemia  Continue atorvastatin    VTE Risk Mitigation (From admission, onward)           Ordered     Reason for No Pharmacological VTE Prophylaxis  Once        Question:  Reasons:  Answer:  Active Bleeding    11/15/22 1409     IP VTE HIGH RISK PATIENT  Once         11/15/22 1409     Place sequential compression device  Until discontinued         11/15/22 1409                    Discharge Planning   ALEJANDRO:      Code Status: DNR   Is the patient medically ready for discharge?:     Reason for patient still in hospital (select all that apply): Patient trending condition, Treatment and Pending disposition  Discharge Plan A: Home Health                  Katie Patel NP  Department of Hospital Medicine   [facility]

## 2022-11-17 NOTE — PLAN OF CARE
SW spoke with pt and pts wife at bedside to discuss dc planning. Pts wife reported that she is agreeable for home hospice with [facility]. Pt confirmed that she has spoken with Catrina and signed admission paperwork. Pts wife reported that she will like dme delivered to home today and pt to dc tomorrow. SW expressed understanding. SW confirmed home address as [address] [place] [id number]. Pts wife completed Pts choice form. Pts choice form placed in blue folder at nursing station.     SW spoke with Catrina with [facility]. Catrina reported that dme will be delivered today. SW expressed to Catrina to anticipate dc tomorrow.      JasminCally (Spouse)   [phone]     SW notified NP of stated above.     SW will continue to follow pt throughout his transitions of care and assist with any dc needs.      11/17/22 1516   Post-Acute Status   Post-Acute Authorization Hospice

## 2022-11-17 NOTE — CONSULTS
[facility] - Telemetry  Hematology/Oncology  Consult Note    Patient Name: Joi Castellanos  MRN: [id number]  Admission Date: 11/15/2022  Hospital Length of Stay: 1 days  Code Status: DNR   Attending Provider: Adrian Ramos MD  Consulting Provider: Rj Wilson MD  Primary Care Physician: Marc Lakhani MD  Principal Problem:Acute ischemic multifocal multiple vascular territories stroke    Inpatient consult to Hematology/Oncology  Consult performed by: Rj Wilson MD  Consult ordered by: Katie Patel NP  Reason for consult: cancer        Subjective:     HPI:  77 year-old male with metastatic lung cancer was admitted on 11/15/22 for encephalopathy. MRI brain revealed multiple embolic strokes. Consult is for cancer.      VIRTUAL TELENOTE    Start time: 8:00am  Chief complaint: lung cancer  The patient location is: [facility]  The patient arrived at: 8:00am  Present with the patient at the time of the telemed/virtual assessment: by himself    End time: 8:10am    Total time spent with patient: 10 minutes    The attending portion of this evaluation, treatment, and documentation was performed per Rj Wilson MD via Telemedicine AudioVisual using the secure [employer] software platform with 2 way audio/video. The provider was located off-site and the patient is located in the hospital. The aforementioned video software was utilized to document the relevant history and physical exam.    Limited review of systems due to confusion. He endorses fatigue, weakness.      Oncology Treatment Plan:   OP NSCLC PEMETREXED + CARBOPLATIN (AUC) Q3W    Medications:  Continuous Infusions:  Scheduled Meds:   atorvastatin  80 mg Oral Daily    azithromycin  500 mg Intravenous Q24H    cefTRIAXone (ROCEPHIN) IVPB  1 g Intravenous Q24H    losartan  100 mg Oral Daily     PRN Meds:sodium chloride, sodium chloride, acetaminophen, dextrose 10%, dextrose 10%, dextrose 10%, dextrose 10%, glucagon (human recombinant), glucagon (human recombinant),  glucose, glucose, glucose, glucose, HYDROcodone-acetaminophen, hydrOXYzine pamoate, insulin aspart U-100, melatonin, naloxone, ondansetron, sodium chloride 0.9%, sodium chloride 0.9%     Review of patient's allergies indicates:   Allergen Reactions    Asa [aspirin]      Patient states he doesn't have allergy to medication - was instructed by provider not to take d/t bleeding risk b/c he was taking a high dose previously        Past Medical History:   Diagnosis Date    Arthritis     Cancer     Diabetes mellitus, type II     fasting bg criteria    Hyperlipidemia     Hypertension     Ischemic cerebrovascular accident (CVA) 11/15/2022     Past Surgical History:   Procedure Laterality Date    TONSILLECTOMY       Family History       Problem Relation (Age of Onset)    Arthritis Sister    Breast cancer Maternal Aunt    Cancer Mother    Deep vein thrombosis Father    Emphysema Father    Fibromyalgia Sister    Heart attack Father    Hypertension Mother    No Known Problems Daughter, Son    Osteoarthritis Mother    Pulmonary embolism Father          Tobacco Use    Smoking status: Never    Smokeless tobacco: Never   Substance and Sexual Activity    Alcohol use: Yes     Comment: Occasionally    Drug use: No    Sexual activity: Yes     Partners: Female       Review of Systems   Unable to perform ROS: Mental status change   Constitutional:  Positive for fatigue.   Neurological:  Positive for weakness.   Objective:     Vital Signs (Most Recent):  Temp: 98.1 °F (36.7 °C) (11/17/22 1123)  Pulse: 91 (11/17/22 1218)  Resp: 18 (11/17/22 1123)  BP: (!) 149/69 (11/17/22 1123)  SpO2: (!) 94 % (11/17/22 1123)   Vital Signs (24h Range):  Temp:  [97.2 °F (36.2 °C)-99.1 °F (37.3 °C)] 98.1 °F (36.7 °C)  Pulse:  [] 91  Resp:  [18-20] 18  SpO2:  [90 %-94 %] 94 %  BP: (147-162)/(66-87) 149/69     Weight: 80.7 kg (178 lb)  Body mass index is 22.85 kg/m².  Body surface area is 2.05 meters squared.    No intake or output data in  the 24 hours ending 11/17/22 1644    Physical Exam  Deferred due to telemedicine visit.    Significant Labs:   CBC:   Recent Labs   Lab 11/15/22  2338 11/16/22  0309 11/17/22  0415   WBC 17.51* 19.63* 16.21*   HGB 7.2* 7.3* 7.1*   HCT 21.6* 22.4* 22.0*   PLT 82* 90* 29*    and CMP:   Recent Labs   Lab 11/16/22  0309 11/17/22  0415    146*   K 3.6 3.7    113*   CO2 20* 18*   * 152*   BUN 16 18   CREATININE 0.7 0.6   CALCIUM 8.7 8.5*   ANIONGAP 11 15       Diagnostic Results:  MRI brain (11/15/22):  1. Multiple acute infarctions in the left frontal lobe and insular cortex, the bilateral occipital lobes, the right parietal lobe and the right cerebellar hemisphere.  Findings can be seen with embolic disease.  2. Small focus of susceptibility adjacent to the left insular cortex, highly concerning for a clot in an M3 branch.  3. Redemonstration of several small enhancing lesions concerning for early metastatic disease.      Assessment/Plan:     Non-small cell cancer of right lung  - clinical condition has declined dramatically. Now with encephalopathy due to multiple embolic strokes.  - performance status is poor. He is not a candidate for chemotherapy/systemic therapy.  - I spoke to wife this afternoon as well.  - agree with palliative care regarding hospice.        Thank you for your consult.     Rj Wilson MD  Hematology/Oncology  [place] - Telemetry

## 2022-11-17 NOTE — SUBJECTIVE & OBJECTIVE
Interval History: No acute events overnight.  Pt removed his arm splint overnight. Pt required redirection.  Wife at bedside.     Medications:  Continuous Infusions:  Scheduled Meds:   atorvastatin  80 mg Oral Daily    azithromycin  500 mg Intravenous Q24H    cefTRIAXone (ROCEPHIN) IVPB  1 g Intravenous Q24H    losartan  100 mg Oral Daily     PRN Meds:sodium chloride, sodium chloride, acetaminophen, dextrose 10%, dextrose 10%, dextrose 10%, dextrose 10%, glucagon (human recombinant), glucagon (human recombinant), glucose, glucose, glucose, glucose, HYDROcodone-acetaminophen, hydrOXYzine pamoate, insulin aspart U-100, melatonin, naloxone, ondansetron, sodium chloride 0.9%, sodium chloride 0.9%    Objective:     Vital Signs (Most Recent):  Temp: 98.1 °F (36.7 °C) (11/17/22 1123)  Pulse: 91 (11/17/22 1218)  Resp: 18 (11/17/22 1123)  BP: (!) 149/69 (11/17/22 1123)  SpO2: (!) 94 % (11/17/22 1123)   Vital Signs (24h Range):  Temp:  [97.2 °F (36.2 °C)-99.1 °F (37.3 °C)] 98.1 °F (36.7 °C)  Pulse:  [] 91  Resp:  [18-20] 18  SpO2:  [90 %-94 %] 94 %  BP: (147-162)/(66-87) 149/69     Weight: 80.7 kg (178 lb)  Body mass index is 22.85 kg/m².    Physical Exam  Constitutional:       General: He is awake. More alert today.      Appearance: He is normal weight. He is ill-appearing.      Interventions: Nasal cannula in place.   HENT:      Head: Normocephalic.      Nose: Nose normal.      Mouth/Throat:      Mouth: Mucous membranes are dry.   Cardiovascular:      Rate and Rhythm: Normal rate and regular rhythm.      Pulses: Normal pulses.   Pulmonary:      Effort: Pulmonary effort is normal.      Breath sounds: Decreased basilar breath sounds present.   Abdominal:      General: Abdomen is flat. Bowel sounds are normal.      Palpations: Abdomen is soft.   Musculoskeletal:      Comments: Splint to right arm    Skin:     General: Skin is warm.      Capillary Refill: Capillary refill takes less than 2 seconds.      Coloration: Skin  is pale.   Neurological:      Mental Status: He is alert. He is confused.      Motor: Weakness present.      Comments: Pt oriented to self and wife    Psychiatric:         Attention and Perception: He is inattentive.         Speech: Speech normal.    confused              Review of Symptoms        Symptom Assessment (ESAS 0-10 Scale)  Pain:  0  Dyspnea:  0  Anxiety:  0  Nausea:  0  Depression:  0  Anorexia:  0  Fatigue:  0  Insomnia:  0  Restlessness:  0  Agitation:  0  Unable to complete assessment due to Mental status change            Pain Assessment in Advanced Demential Scale (PAINAD)   Breathing - Independent of vocalization:  0  Negative vocalization:  0  Facial expression:  0  Body language:  0  Consolability:  1  Total:  1     Performance Status:  40     Living Arrangements:  Lives with spouse and Lives in home     Psychosocial/Cultural: Pt lives at home with his wife.  Pt had 3 grown children.  Pt is retired.     Spiritual:  F - Inge and Belief:  Anglican   I - Importance:  Moderate   C - Community:  Family support   A - Address in Care:  Pastoral visits prn      Time-Based Charting:  Yes  Chart Review: 16 minutes  Face to Face: 18 minutes  Symptom Assessment: 9 minutes  Coordination of Care: 13 minutes  Discharge Planning: 15 minutes  Advance Care Plannin minutes  Goals of Care: 9 minutes     Total Time Spent: 89 minutes    Advance Care Planning   Advance Directives:   Living Will: Yes        Copy on chart: Yes    Medical Power of : Yes    Goals of Care: What is most important right now is to focus on spending time at home, avoiding the hospital, remaining as independent as possible, symptom/pain control, quality of life, even if it means sacrificing a little time. Accordingly, we have decided that the best plan to meet the patient's goals includes enrolling in hospice care.       Significant Labs: All pertinent labs within the past 24 hours have been reviewed.  CBC:   Recent Labs   Lab  11/17/22 0415   WBC 16.21*   HGB 7.1*   HCT 22.0*   *   PLT 29*     BMP:  Recent Labs   Lab 11/17/22 0415   *   *   K 3.7   *   CO2 18*   BUN 18   CREATININE 0.6   CALCIUM 8.5*   MG 1.9     LFT:  Lab Results   Component Value Date    AST 27 11/15/2022    ALKPHOS 546 (H) 11/15/2022    BILITOT 0.7 11/15/2022     Albumin:   Albumin   Date Value Ref Range Status   11/15/2022 2.5 (L) 3.5 - 5.2 g/dL Final     Protein:   Total Protein   Date Value Ref Range Status   11/15/2022 6.6 6.0 - 8.4 g/dL Final     Lactic acid:   No results found for: LACTATE    Significant Imaging: I have reviewed all pertinent imaging results/findings within the past 24 hours.

## 2022-11-17 NOTE — PLAN OF CARE
Problem: Physical Therapy  Goal: Physical Therapy Goal  Description: Goals to be met by: 22     Patient will increase functional independence with mobility by performin. Rolling to Left and Right with Moderate Assistance.    Outcome: Adequate for Care Transition     Bed level assessment performed. Pt with limited tolerance to activity 2/2 R shoulder pain and increased drowsiness during session. Limited AROM noted. Pt required TotalA x 1-2 persons for B rolling. Pt to d/c home with hospice - notified post evaluation. D/c PT services.

## 2022-11-17 NOTE — ASSESSMENT & PLAN NOTE
Patient's FSGs are controlled on current medication regimen.  Last A1c reviewed-   Lab Results   Component Value Date    HGBA1C 5.7 (H) 11/15/2022     Most recent fingerstick glucose reviewed-   Recent Labs   Lab 11/16/22  1627 11/16/22 2024 11/17/22  0532 11/17/22  1122   POCTGLUCOSE 163* 197* 161* 176*     Current correctional scale  Low  Maintain anti-hyperglycemic dose as follows-   Antihyperglycemics (From admission, onward)    Start     Stop Route Frequency Ordered    11/15/22 1812  insulin aspart U-100 pen 0-5 Units         -- SubQ Before meals & nightly PRN 11/15/22 1713        Hold Oral hypoglycemics while patient is in the hospital.

## 2022-11-17 NOTE — SUBJECTIVE & OBJECTIVE
Interval History:  Per nursing, patient placed in restraints overnight b/c he was pulling out IV and removing oxygen.  Patient altered, yelling out asking for his wife and that he is hungry.  Asked nursing to assist with patient's meals.    Review of Systems   Unable to perform ROS: Other (altered mental status)   Objective:     Vital Signs (Most Recent):  Temp: 98.1 °F (36.7 °C) (11/17/22 1123)  Pulse: 91 (11/17/22 1218)  Resp: 18 (11/17/22 1123)  BP: (!) 149/69 (11/17/22 1123)  SpO2: (!) 94 % (11/17/22 1123)   Vital Signs (24h Range):  Temp:  [97.2 °F (36.2 °C)-99.1 °F (37.3 °C)] 98.1 °F (36.7 °C)  Pulse:  [] 91  Resp:  [18-20] 18  SpO2:  [90 %-94 %] 94 %  BP: (147-162)/(66-87) 149/69     Weight: 80.7 kg (178 lb)  Body mass index is 22.85 kg/m².  No intake or output data in the 24 hours ending 11/17/22 1326   Physical Exam    Significant Labs: All pertinent labs within the past 24 hours have been reviewed.    Significant Imaging: I have reviewed all pertinent imaging results/findings within the past 24 hours.

## 2022-11-17 NOTE — PROGRESS NOTES
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      Assessment/Plan:     Palliative care encounter  11/17   -Met with pts wife at bedside.  Cally has a much clearer understanding of pts prognosis since discussion with hospital medicine team and vascular neurology team.  We discussed the cancers rapid progression in addition to the new stroke and what this means for the pts previously planned cancer treatments.  Wife verbally states that she understands that this changes treatment options.  Cally states that she knew, "his time was rapidly decreasing and that he would not be with us much longer."  Emotional support offered.  We examined goals of care at this point.  Wife stresses that her highest priority is keeping the pt comfortable and at home with her.  Placement of any sort is not an acceptable option.  The topic of home hospice was introduced.  Cally in agreement and met with hospice [facility].  Pt will enroll in home hospice. CM and Md aware.   -Per conversation with onc- agreed with hospice dispo as cancer has progressed too rapidly to continue previously scheduled treatments to begin in 3 weeks.  Dr Wilson to follow up with wife and further reiterate this.  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  Pt is well know to this palliative team.    - Pt " "resting in bed.  Wife, Cally, at the bedside. Pt is oriented to self and wife.  He is unsure his location or year.  On all prior encounters, pt was Ax3 with mentation completely intact. Today, pt will follow simple commands and responds conversationally with mainly appropriate responses. Pt required redirection multiple times throughout the visit.   -  Pts wife reports that they met with Dr Wilson on Monday, and the pt is supposed to begin cancer treatments in 3 weeks.  Pt is also scheduled for orthopedic surgery next week to repair R humerus.    -Pt is well know to this palliative team as he was seen on last admission as well as in the palliative outpatient clinic on 11/8/2022.  Goals of care have been elaborately discussed with the pt and the pt and wife have clear goals.  Per last office visit, "Completed an MPOA designating his spouse Cally and an LW indicating that in the event of terminal incapacity he would NOT accept long-term life support and would wish for comfort measures only without artificial fluids and nutrition. Otherwise pt has requested no limitations on care.  Previously, the pt has expressed that he will accept all treatment options that will help him fight this cancer and his extend his life up until the point of terminal incapacity.  At that point he would want comfort based care.   - Pts wife verbally expresses that the pt suffered a stroke and expresses fear that this will change all treatment options and the pts quality of life.  Emotional support offered.  Pts wife is understandably emotional and is awaiting updates from the pts attending physician and a visit from neurology so she can better understand her husbands prognosis and relay information to their children.  Wife is fearful to leave the pts side, but acknowledges that she has not had any real sleep in the past few days and herself has a Drs appt tomorrow that she can not miss.  Discussed safety options for the pt ( bed alarms, " "video sitters) and discussed the possibility of her children coming to offer the wife some relief.      At this time, the goals of care for the pt have not changed, all aggressive treatment options are requested.  Pts wife acknowledges that depending on how the pt progresses over the next few days the family may need to reexamine the goals of care.  Pt remains a full code.        Subjective:     Chief Complaint:   Chief Complaint   Patient presents with    generalized weakness    Hip Pain     Arrived via [facility] w c/o non-traumatic L hip pain and generalized weakness for several days. Dx with lung CA with mets 2 weeks ago. Family originally called EMS for low O2 sats. Pt denies ever experiencing any SOB. 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 Patient was recently hospitalized on 10/31 for extensive LLE DVT and partially occlusive thrombus in the R femoral vein. Imaging at that time also revealed right hilar lung mass and pleural effusion. Thoracentesis was performed and 1L of pleural fluid was removed. Cytology/pathology was positive for likely primary adenocarcinoma. Patient recently seen by heme/onc for systemic therapy which patient scheduled to start in 3 weeks. Chest xray on admission notable for right pleural effusion. Code stroke activated in the ED.     Interval History: No acute events overnight.  Pt removed his arm splint overnight. Pt required redirection.  Wife at bedside.     Medications:  Continuous Infusions:  Scheduled Meds:   atorvastatin  80 mg Oral Daily    azithromycin  500 mg Intravenous Q24H    cefTRIAXone (ROCEPHIN) IVPB  1 g Intravenous Q24H    losartan  100 mg Oral Daily     PRN Meds:sodium chloride, sodium chloride, acetaminophen, dextrose 10%, " dextrose 10%, dextrose 10%, dextrose 10%, glucagon (human recombinant), glucagon (human recombinant), glucose, glucose, glucose, glucose, HYDROcodone-acetaminophen, hydrOXYzine pamoate, insulin aspart U-100, melatonin, naloxone, ondansetron, sodium chloride 0.9%, sodium chloride 0.9%    Objective:     Vital Signs (Most Recent):  Temp: 98.1 °F (36.7 °C) (11/17/22 1123)  Pulse: 91 (11/17/22 1218)  Resp: 18 (11/17/22 1123)  BP: (!) 149/69 (11/17/22 1123)  SpO2: (!) 94 % (11/17/22 1123)   Vital Signs (24h Range):  Temp:  [97.2 °F (36.2 °C)-99.1 °F (37.3 °C)] 98.1 °F (36.7 °C)  Pulse:  [] 91  Resp:  [18-20] 18  SpO2:  [90 %-94 %] 94 %  BP: (147-162)/(66-87) 149/69     Weight: 80.7 kg (178 lb)  Body mass index is 22.85 kg/m².    Physical Exam  Constitutional:       General: He is awake. More alert today.      Appearance: He is normal weight. He is ill-appearing.      Interventions: Nasal cannula in place.   HENT:      Head: Normocephalic.      Nose: Nose normal.      Mouth/Throat:      Mouth: Mucous membranes are dry.   Cardiovascular:      Rate and Rhythm: Normal rate and regular rhythm.      Pulses: Normal pulses.   Pulmonary:      Effort: Pulmonary effort is normal.      Breath sounds: Decreased basilar breath sounds present.   Abdominal:      General: Abdomen is flat. Bowel sounds are normal.      Palpations: Abdomen is soft.   Musculoskeletal:      Comments: Splint to right arm    Skin:     General: Skin is warm.      Capillary Refill: Capillary refill takes less than 2 seconds.      Coloration: Skin is pale.   Neurological:      Mental Status: He is alert. He is confused.      Motor: Weakness present.      Comments: Pt oriented to self and wife    Psychiatric:         Attention and Perception: He is inattentive.         Speech: Speech normal.    confused              Review of Symptoms        Symptom Assessment (ESAS 0-10 Scale)  Pain:  0  Dyspnea:  0  Anxiety:  0  Nausea:  0  Depression:  0  Anorexia:   0  Fatigue:  0  Insomnia:  0  Restlessness:  0  Agitation:  0  Unable to complete assessment due to Mental status change            Pain Assessment in Advanced Demential Scale (PAINAD)   Breathing - Independent of vocalization:  0  Negative vocalization:  0  Facial expression:  0  Body language:  0  Consolability:  1  Total:  1     Performance Status:  40     Living Arrangements:  Lives with spouse and Lives in home     Psychosocial/Cultural: Pt lives at home with his wife.  Pt had 3 grown children.  Pt is retired.     Spiritual:  F - Inge and Belief:  Mosque   I - Importance:  Moderate   C - Community:  Family support   A - Address in Care:  Pastoral visits prn      Time-Based Charting:  Yes  Chart Review: 16 minutes  Face to Face: 18 minutes  Symptom Assessment: 9 minutes  Coordination of Care: 13 minutes  Discharge Planning: 15 minutes  Advance Care Plannin minutes  Goals of Care: 9 minutes     Total Time Spent: 89 minutes    Advance Care Planning   Advance Directives:   Living Will: Yes        Copy on chart: Yes    Medical Power of : Yes    Goals of Care: What is most important right now is to focus on spending time at home, avoiding the hospital, remaining as independent as possible, symptom/pain control, quality of life, even if it means sacrificing a little time. Accordingly, we have decided that the best plan to meet the patient's goals includes enrolling in hospice care.       Significant Labs: All pertinent labs within the past 24 hours have been reviewed.  CBC:   Recent Labs   Lab 22   WBC 16.21*   HGB 7.1*   HCT 22.0*   *   PLT 29*     BMP:  Recent Labs   Lab 22   *   *   K 3.7   *   CO2 18*   BUN 18   CREATININE 0.6   CALCIUM 8.5*   MG 1.9     LFT:  Lab Results   Component Value Date    AST 27 11/15/2022    ALKPHOS 546 (H) 11/15/2022    BILITOT 0.7 11/15/2022     Albumin:   Albumin   Date Value Ref Range Status   11/15/2022 2.5 (L) 3.5 -  5.2 g/dL Final     Protein:   Total Protein   Date Value Ref Range Status   11/15/2022 6.6 6.0 - 8.4 g/dL Final     Lactic acid:   No results found for: LACTATE    Significant Imaging: I have reviewed all pertinent imaging results/findings within the past 24 hours.    Jerri Zamora NP  Palliative Medicine  [facility]

## 2022-11-17 NOTE — PLAN OF CARE
Q 2 hour rounding maintained. Bilateral wrist restraints on.no injury noted.denies any pain.No distress noted.

## 2022-11-17 NOTE — PLAN OF CARE
Skilled OT evaluation completed, co-evaluation with PT secondary due to complexity and multiple comorbidities including R humerus fx, lung cancer with mets, and now CVA - see detailed note to follow. Noted palliative consult occurring after therapy evaluation with hospice referral. Patient in significant discomfort during evaluation, limited to bed level repositioning requiring total assist of 1-2 as well as patient and family supportive interviewing. OT facilitated secure chat to ortho MD 2/2 current brace to RUE is increasing patient's level of discomfort. In event patient remains hospitalized, OT to follow to provide continued teaching/ education to family regarding positioning techniques and trial use of alternative brace/slings to RUE with emphasis on maximizing patient's level of overall comfort.    Problem: Occupational Therapy  Goal: Occupational Therapy Goal  Description: Goals to be met by: 12/1/2022     Patient will increase functional independence with ADLs by performing:    Self feeding with adaptive technique as needed to support PO intake  Family reciprocation of positioning / turning techniques and additional equipment as needed to increase comfort and ease without increased R shoulder pain    Outcome: Ongoing, Progressing

## 2022-11-17 NOTE — HPI
77 year-old male with metastatic lung cancer was admitted on 11/15/22 for encephalopathy. MRI brain revealed multiple embolic strokes. Consult is for cancer.

## 2022-11-17 NOTE — PT/OT/SLP EVAL
Physical Therapy Evaluation and Discharge Note    Patient Name:  Joi Castellanos   MRN:  [id number]    Recommendations:     Discharge Recommendations:  home with hospice   Discharge Equipment Recommendations:  (Defer to hospice)   Barriers to discharge: None    Assessment:     Joi Castellanos is a 77 y.o. 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 .      Bed level assessment performed. Pt with limited tolerance to activity 2/2 R shoulder pain and increased drowsiness during session. Limited AROM noted. Pt required TotalA x 1-2 persons for B rolling. Pt to d/c home with hospice - notified post evaluation. D/c PT services.     Recent Surgery: * No surgery found *      Plan:     During this hospitalization, patient does not require further acute PT services.  Please re-consult if situation changes.      Subjective     Chief Complaint: Pain  Patient/Family Comments/goals: None stated  Pain/Comfort:  Pain Rating 1:  (not rated, increased pain with movement)  Location - Side 1: Right  Location 1: arm  Pain Addressed 1: Pre-medicate for activity, Reposition, Distraction, Nurse notified, Cessation of Activity  Pain Rating Post-Intervention 1:  (no pain at rest)    Patients cultural, spiritual, Yarsani conflicts given the current situation: no    Living Environment:  Pt lives with his wife in a [facility], [facility], and [facility] with [facility]  Prior to admission, patients level of function was Mod I with use of SPC for mobility, pt requiring assist for ADL's 2/2 RUE fx and NWB.  Equipment used at home: walker, rolling, cane, straight, wheelchair, hospital bed.   Upon discharge, patient will have assistance from family, hospice staff.    Objective:     Communicated with nsg prior to session.  Patient found supine with bed alarm, restraints, telemetry, peripheral IV upon PT entry to room.    General Precautions: Standard, fall   Orthopedic Precautions:RUE non weight bearing   Braces:  UE brace   Respiratory Status: Nasal cannula    Exams:  Cognitive Exam:  Patient is oriented to Person and Place  Limited eval 2/2 entering while pt being changed with nsg and PCT present; assisted pt with re-donning UE splint/brace  Pt with limited AROM of B LE, pt with limited tolerance to activity      Functional Mobility:  Bed Mobility:     Rolling Left:  total assistance and of 1-2 persons  Rolling Right: total assistance and of 1-2 persons  Scooting: total assistance and of 2 persons    AM-PAC 6 CLICK MOBILITY  Total Score:6       Treatment and Education:   Bed level assessment performed.   Pt with limited tolerance to activity 2/2 R shoulder pain and increased drowsiness during session.   Limited AROM BLE noted.   Attempted supine>sit t/f, however, pt unable to tolerate despite 3 person assist   Pt required TotalA x 1-2 persons for B rolling.   2 therapist assist needed to maintain pt in side-lying while 2nd therapist donned UE brace  Significant time required to assist pt with re-donning splint/brace 2/2 nsg/PCT doffing while changing pt.  RUE supported during rolling/scooting and donning brace; placed R UE sling to provide extra support to stabilize arm   Assisted pt and PCT with rolling and changing bed linens   Pt to d/c home with hospice - notified post evaluation.     AM-PAC 6 CLICK MOBILITY  Total Score:6     Patient left HOB elevated with all lines intact, call button in reach, bed alarm on, nsg notified, and family present.    GOALS:   Multidisciplinary Problems       Physical Therapy Goals          Problem: Physical Therapy    Goal Priority Disciplines Outcome Goal Variances Interventions   Physical Therapy Goal     PT, PT/OT Adequate for Care Transition     Description: Goals to be met by: 22     Patient will increase functional independence with mobility by performin. Rolling to Left and Right with Moderate Assistance.                         History:     Past Medical History:    Diagnosis Date    Arthritis     Cancer     Diabetes mellitus, type II     fasting bg criteria    Hyperlipidemia     Hypertension     Ischemic cerebrovascular accident (CVA) 11/15/2022       Past Surgical History:   Procedure Laterality Date    TONSILLECTOMY         Time Tracking:     PT Received On: 11/17/22  PT Start Time: 1153     PT Stop Time: 1247  PT Total Time (min): 54 min     Billable Minutes: Evaluation 14 and Therapeutic Activity 40 (cotx with OT)      11/17/2022

## 2022-11-17 NOTE — PT/OT/SLP EVAL
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  He presents with ..The primary encounter diagnosis was Anemia of chronic disease. 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  . Performance deficits affecting function: weakness, impaired endurance, impaired cognition, visual deficits, impaired sensation, impaired self care skills, impaired functional mobility, gait instability, impaired balance, pain, decreased safety awareness, decreased upper extremity function, decreased coordination, orthopedic precautions, decreased ROM.      Skilled OT evaluation completed, co-evaluation with PT secondary due to complexity and multiple comorbidities including R humerus fx, lung cancer with mets, and now CVA. Noted palliative consult occurring after therapy evaluation with hospice referral. Patient in significant discomfort during evaluation, limited to bed level repositioning requiring total assist of 1-2 as well as patient and family supportive interviewing. OT facilitated secure chat to ortho MD 2/2 current brace to RUE is increasing patient's level of discomfort. In event patient remains hospitalized, OT to follow to  provide continued teaching/ education to family regarding positioning techniques and trial use of alternative brace/slings to RUE with emphasis on maximizing patient's level of overall comfort.    Rehab Prognosis:  limited ; patient would benefit from acute skilled OT services to address these deficits and reach maximum level of function.       Plan:     Patient to be seen 3 x/week to address the above listed problems via self-care/home management, therapeutic activities, therapeutic exercises, neuromuscular re-education  Plan of Care Reviewed with: patient, spouse, son    Subjective     Chief Complaint: general grimacing; discomfort globally; discomfort in R shoulder  Patient/Family Comments/goals: family report they want patient to have increased comfort and to hopefully move easier    Occupational Profile: achieved through chart review and family interview with min interjecting by patient (patient with fatigue/ discomfort - limiting ability to verbalize history)  Living Environment: Patient lives with spouse in a single story home, no steps to enter, walk in shower with a built in shower bench. Recently received a shower chair but has not yet used.  Previous level of function: Patient prior to October was mobilizing with occasional use of cane and performing ADL tasks with standby to min assist. After fall in October and R humerus fracture (which currently NWB RUE and tentatively scheduled for ortho OP surgery 11/23/2022) returned home with home health services. During that hospitalization - imaging concurred cancer diagnosis. Reported to have returned to mobility in home with cane with spouse's min to mod assist for mobility, dressing, bathing, etc. Had received 2 sessions of home health PT. Was preparing for future chemo sessions. Then had left hip pain and weakness with spouse reporting patient demonstrating additional decline in function, resulting ER visit which is when CVA was revealed.  Equipment Used at  Home:  walker, rolling, cane, straight, wheelchair, hospital bed  Assistance upon Discharge: will have support of spouse in addition to other family in area; reports that it is of most importance to stay out of a facility; will have assist from hospice staff    Pain/Comfort:  Pain Rating 1: other (see comments) (does not rate but indicates generalized discomfort as well as discomfort to R shoulder)  Pain Addressed 1: Pre-medicate for activity, Reposition, Cessation of Activity, Nurse notified  Pain Rating Post-Intervention 1:  (does not endorse increased pain when returned to resting position ; however, notable increased pain with any movement)    Patients cultural, spiritual, Shinto conflicts given the current situation:      Objective:     Communicated with: nursing prior to session.  Patient found HOB elevated with Other (comments), restraints, telemetry, bed alarm, peripheral IV (telesitter) upon OT entry to room.    General Precautions: Standard, fall   Orthopedic Precautions:RUE non weight bearing   Braces: UE brace  Respiratory Status: Nasal cannula, flow 3 L/min    Occupational Performance:    Bed Mobility:    Patient completed Rolling/Turning to Left with  total assist, 1-2 persons  Patient completed Rolling/Turning to Right with total assist, 1-2 persons  Patient completed Scooting/Bridging with total assist, use of draw sheet, 2 persons      Activities of Daily Living:  Feeding:  not performed; via interview with family as well as clinical inference due to poor tolerance of bed mobility, patient would require total assist for supported sitting in hospital bed to endorse upright posture, set up assist for opening foods due to NWB RUE/restricted movement of RUE; and at least moderate assist to self feed secondary due to low energy to complete feeding self  Toileting: total assist; total assist for linen change    Cognitive/Visual Perceptual:  Cognitive/Psychosocial Skills:     -       Oriented to:  Person and Place   -       Follows Commands/attention:limited 2/2 pain, fatigue; decreased attention; intermittently answers simple questions  -       Memory: deficits noted  -       Safety awareness/insight to disability: impaired   -       Mood/Affect/Coping skills/emotional control: Lethargic  Visual/Perceptual:      -unable to assess comprehensibly; suspect potential visual deficits 2/2 CVA    Physical Exam:  Skin integrity: Thin  Dominant hand:    -       right  Upper Extremity Range of Motion:     -       Right Upper Extremity: defer R shoulder 2/2 fx; distal ROM WFL  -       Left Upper Extremity: WFL  Upper Extremity Strength:    -       Right Upper Extremity: not assessed 2/2 fx  -       Left Upper Extremity: not formally assessed; presents functionally at 3+/5 grossly   Strength: weak bilaterally    AMPAC 6 Click ADL:  AMPAC Total Score: 10    Treatment & Education:  OT/PT entered room to assist in repositioning of RUE brace and for evaluation. Brief explanation of role of OT. Eval limited 2/2 priority of increasing patient's comfort.  Occupational interview and history obtained primarily via patient's spouse. Gross weakness throughout body with drowsiness throughout session. SpO2 on 3L at 90-92 %; spouse reports patient with discomfort from cannula; discussion provided regarding alternate options for positioning of cannula. Attempt for supine to sit but unable to complete with 3 staff 2/2 pain/discomfort. Donning / repositioning of cumbersome brace in left sidelying. (OT secure chatted ortho MD regarding consideration request for a different / soft brace vs sling.). RUE supported throughout multiple repositioning / rolling for brief change and donning of sling. Neck supported with additional pillow.      Patient left HOB elevated with all lines intact, call button in reach, nursing notified, and spouse and son present    GOALS:   Multidisciplinary Problems       Occupational Therapy Goals           Problem: Occupational Therapy    Goal Priority Disciplines Outcome Interventions   Occupational Therapy Goal     OT, PT/OT Ongoing, Progressing    Description: Goals to be met by: 12/1/2022     Patient will increase functional independence with ADLs by performing:    Self feeding with adaptive technique as needed to support PO intake  Family reciprocation of positioning / turning techniques and additional equipment as needed to increase comfort and ease without increased R shoulder pain                         History:     Past Medical History:   Diagnosis Date    Arthritis     Cancer     Diabetes mellitus, type II     fasting bg criteria    Hyperlipidemia     Hypertension     Ischemic cerebrovascular accident (CVA) 11/15/2022         Past Surgical History:   Procedure Laterality Date    TONSILLECTOMY         Time Tracking:     OT Date of Treatment: 11/17/22  OT Start Time: 1153  OT Stop Time: 1247  OT Total Time (min): 54 min ; co eval / co treat with PT    Billable Minutes:Evaluation 20 min  Therapeutic Activity 34 min    11/17/2022

## 2022-11-17 NOTE — ASSESSMENT & PLAN NOTE
"11/17   -Met with pts wife at bedside.  Cally has a much clearer understanding of pts prognosis since discussion with hospital medicine team and vascular neurology team.  We discussed the cancers rapid progression in addition to the new stroke and what this means for the pts previously planned cancer treatments.  Wife verbally states that she understands that this changes treatment options.  Cally states that she knew, "his time was rapidly decreasing and that he would not be with us much longer."  Emotional support offered.  We examined goals of care at this point.  Wife stresses that her highest priority is keeping the pt comfortable and at home with her.  Placement of any sort is not an acceptable option.  The topic of home hospice was introduced.  Cally in agreement and met with hospice [facility].  Pt will enroll in home hospice. CM and Md aware.   -Per conversation with onc- agreed with hospice dispo as cancer has progressed too rapidly to continue previously scheduled treatments to begin in 3 weeks.  Dr Wilson to follow up with wife and further reiterate this.  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  Pt is well know to this palliative team.    - Pt resting in bed.  Wife, Cally, at the bedside. Pt is oriented to self and wife.  He is unsure his location or year.  On all prior encounters, pt was Ax3 with mentation completely intact. Today, pt will follow simple commands and responds conversationally with mainly appropriate responses. Pt required redirection multiple times throughout the visit.   -  Pts wife reports that they met with Dr Wilson on Monday, and the pt is supposed to begin cancer treatments in 3 weeks.  Pt is also scheduled for orthopedic surgery next week to repair R " "humerus.    -Pt is well know to this palliative team as he was seen on last admission as well as in the palliative outpatient clinic on 11/8/2022.  Goals of care have been elaborately discussed with the pt and the pt and wife have clear goals.  Per last office visit, "Completed an MPOA designating his spouse Cally and an LW indicating that in the event of terminal incapacity he would NOT accept long-term life support and would wish for comfort measures only without artificial fluids and nutrition. Otherwise pt has requested no limitations on care.  Previously, the pt has expressed that he will accept all treatment options that will help him fight this cancer and his extend his life up until the point of terminal incapacity.  At that point he would want comfort based care.   - Pts wife verbally expresses that the pt suffered a stroke and expresses fear that this will change all treatment options and the pts quality of life.  Emotional support offered.  Pts wife is understandably emotional and is awaiting updates from the pts attending physician and a visit from neurology so she can better understand her husbands prognosis and relay information to their children.  Wife is fearful to leave the pts side, but acknowledges that she has not had any real sleep in the past few days and herself has a Drs appt tomorrow that she can not miss.  Discussed safety options for the pt ( bed alarms, video sitters) and discussed the possibility of her children coming to offer the wife some relief.      At this time, the goals of care for the pt have not changed, all aggressive treatment options are requested.  Pts wife acknowledges that depending on how the pt progresses over the next few days the family may need to reexamine the goals of care.  Pt remains a full code.    "

## 2022-11-17 NOTE — CARE UPDATE
Attempted to notify tech of 30 day event monitor order x2 but no answer and voice mailbox is full.

## 2022-11-17 NOTE — ASSESSMENT & PLAN NOTE
Developed after beginning blood - patient confirmed to have suffered from ischemic stroke  -Head CT:    -No acute large vascular territory infarct or intracranial hemorrhage identified.  If persistent neurologic deficit, MRI brain can be obtained.    -Involutional change and suspected sequela minimal chronic microvascular ischemic change.  -MRI brain:  1. Multiple acute infarctions in the left frontal lobe and insular cortex, the bilateral occipital lobes, the right parietal lobe and the right cerebellar hemisphere.  Findings can be seen with embolic disease.  2. Small focus of susceptibility adjacent to the left insular cortex, highly concerning for a clot in an M3 branch.  3. Redemonstration of several small enhancing lesions concerning for early metastatic disease.  -CTA:  1. Limited examination due to poor contrast bolus timing.  No large vessel occlusion.  2. Extensive osseous metastatic disease.  There is a metastatic lesion within the C4 right lateral elements with an associated pathologic fracture of the pedicle.  No evidence of vertebral artery dissection.  3. Moderate or to large right and small left pleural effusions.  4. Partially imaged mediastinal lymphadenopathy as above.    Patient currently in restraints due to removing oxygen and pulling at lines      See Acute ischemic multifocal multiple vascular territories stroke

## 2022-11-17 NOTE — SUBJECTIVE & OBJECTIVE
VIRTUAL TELENOTE    Start time: 8:00am  Chief complaint: lung cancer  The patient location is: [facility]  The patient arrived at: 8:00am  Present with the patient at the time of the telemed/virtual assessment: by himself    End time: 8:10am    Total time spent with patient: 10 minutes    The attending portion of this evaluation, treatment, and documentation was performed per Rj Wilson MD via Telemedicine AudioVisual using the secure [employer] software platform with 2 way audio/video. The provider was located off-site and the patient is located in the hospital. The aforementioned video software was utilized to document the relevant history and physical exam.    Limited review of systems due to confusion. He endorses fatigue, weakness.      Oncology Treatment Plan:   OP NSCLC PEMETREXED + CARBOPLATIN (AUC) Q3W    Medications:  Continuous Infusions:  Scheduled Meds:   atorvastatin  80 mg Oral Daily    azithromycin  500 mg Intravenous Q24H    cefTRIAXone (ROCEPHIN) IVPB  1 g Intravenous Q24H    losartan  100 mg Oral Daily     PRN Meds:sodium chloride, sodium chloride, acetaminophen, dextrose 10%, dextrose 10%, dextrose 10%, dextrose 10%, glucagon (human recombinant), glucagon (human recombinant), glucose, glucose, glucose, glucose, HYDROcodone-acetaminophen, hydrOXYzine pamoate, insulin aspart U-100, melatonin, naloxone, ondansetron, sodium chloride 0.9%, sodium chloride 0.9%     Review of patient's allergies indicates:   Allergen Reactions    Asa [aspirin]      Patient states he doesn't have allergy to medication - was instructed by provider not to take d/t bleeding risk b/c he was taking a high dose previously        Past Medical History:   Diagnosis Date    Arthritis     Cancer     Diabetes mellitus, type II     fasting bg criteria    Hyperlipidemia     Hypertension     Ischemic cerebrovascular accident (CVA) 11/15/2022     Past Surgical History:   Procedure Laterality Date    TONSILLECTOMY       Family History        Problem Relation (Age of Onset)    Arthritis Sister    Breast cancer Maternal Aunt    Cancer Mother    Deep vein thrombosis Father    Emphysema Father    Fibromyalgia Sister    Heart attack Father    Hypertension Mother    No Known Problems Daughter, Son    Osteoarthritis Mother    Pulmonary embolism Father          Tobacco Use    Smoking status: Never    Smokeless tobacco: Never   Substance and Sexual Activity    Alcohol use: Yes     Comment: Occasionally    Drug use: No    Sexual activity: Yes     Partners: Female       Review of Systems   Unable to perform ROS: Mental status change   Constitutional:  Positive for fatigue.   Neurological:  Positive for weakness.   Objective:     Vital Signs (Most Recent):  Temp: 98.1 °F (36.7 °C) (11/17/22 1123)  Pulse: 91 (11/17/22 1218)  Resp: 18 (11/17/22 1123)  BP: (!) 149/69 (11/17/22 1123)  SpO2: (!) 94 % (11/17/22 1123)   Vital Signs (24h Range):  Temp:  [97.2 °F (36.2 °C)-99.1 °F (37.3 °C)] 98.1 °F (36.7 °C)  Pulse:  [] 91  Resp:  [18-20] 18  SpO2:  [90 %-94 %] 94 %  BP: (147-162)/(66-87) 149/69     Weight: 80.7 kg (178 lb)  Body mass index is 22.85 kg/m².  Body surface area is 2.05 meters squared.    No intake or output data in the 24 hours ending 11/17/22 1644    Physical Exam  Deferred due to telemedicine visit.    Significant Labs:   CBC:   Recent Labs   Lab 11/15/22  2338 11/16/22  0309 11/17/22  0415   WBC 17.51* 19.63* 16.21*   HGB 7.2* 7.3* 7.1*   HCT 21.6* 22.4* 22.0*   PLT 82* 90* 29*    and CMP:   Recent Labs   Lab 11/16/22  0309 11/17/22  0415    146*   K 3.6 3.7    113*   CO2 20* 18*   * 152*   BUN 16 18   CREATININE 0.7 0.6   CALCIUM 8.7 8.5*   ANIONGAP 11 15       Diagnostic Results:  MRI brain (11/15/22):  1. Multiple acute infarctions in the left frontal lobe and insular cortex, the bilateral occipital lobes, the right parietal lobe and the right cerebellar hemisphere.  Findings can be seen with embolic disease.  2. Small focus of  susceptibility adjacent to the left insular cortex, highly concerning for a clot in an M3 branch.  3. Redemonstration of several small enhancing lesions concerning for early metastatic disease.

## 2022-11-17 NOTE — ASSESSMENT & PLAN NOTE
CXR:    -Worsening pulmonary edema pneumonia aspiration or sepsis  WBC 16.21  Patient afebrile  Sputum cx pending  Received 1x dose azithromycin and rocephin in ED  Continue empiric CAP coverage

## 2022-11-17 NOTE — ASSESSMENT & PLAN NOTE
- clinical condition has declined dramatically. Now with encephalopathy due to multiple embolic strokes.  - performance status is poor. He is not a candidate for chemotherapy/systemic therapy.  - I spoke to wife this afternoon as well.  - agree with palliative care regarding hospice.

## 2022-11-17 NOTE — ASSESSMENT & PLAN NOTE
Peak 0.717  EKG 11/15:  Normal sinus rhythm  Normal ECG  When compared with ECG of 31-OCT-2022 15:48,  No significant change was found

## 2022-11-17 NOTE — ACP (ADVANCE CARE PLANNING)
Advance Care Planning     Date: 11/17/2022    Code Status  In light of the patients advanced and life limiting illness,I engaged the the family in a conversation about the patient's preferences for care  at the very end of life. The patient wishes to have a natural, peaceful death.  Along those lines, the patient does not wish to have CPR or other invasive treatments performed when his heart and/or breathing stops. I communicated to the family that a DNR order would be placed in his medical record to reflect this preference.  I spent a total of 20 minutes engaging the patient in this advance care planning discussion.       [facility]  I engaged the family in a conversation about advance care planning and we specifically addressed what the goals of care would be moving forward, in light of the patient's change in clinical status, specifically multifocal stroke, pathologic neck fracture.  We did specifically address the patient's likely prognosis, which is poor.  We explored the patient's values and preferences for future care.  The family endorses that what is most important right now is to focus on spending time at home, avoiding the hospital, remaining as independent as possible, symptom/pain control, and quality of life, even if it means sacrificing a little time    Accordingly, we have decided that the best plan to meet the patient's goals includes enrolling in hospice care    I did explain the role for hospice care at this stage of the patient's illness, including its ability to help the patient live with the best quality of life possible.  We will be making a hospice referral.    I spent a total of 20 minutes engaging the patient in this advance care planning discussion.            Ronald Ho MD  Hospice and Palliative Medicine  Palliative Care Pager: [phone]

## 2022-11-17 NOTE — ASSESSMENT & PLAN NOTE
MRI 11/15:  1. Multiple acute infarctions in the left frontal lobe and insular cortex, the bilateral occipital lobes, the right parietal lobe and the right cerebellar hemisphere.  Findings can be seen with embolic disease.  2. Small focus of susceptibility adjacent to the left insular cortex, highly concerning for a clot in an M3 branch.  3. Redemonstration of several small enhancing lesions concerning for early metastatic disease.    TTE 11/15:  The left ventricle is normal in size with normal systolic function.  The estimated ejection fraction is 55%.  Mild aortic regurgitation.  Mild tricuspid regurgitation.  The estimated PA systolic pressure is 53 mmHg.  Normal right ventricular size with normal right ventricular systolic function.  There is moderate pulmonary hypertension.  There is no evidence of intracardiac shunting.    CTA 11/15:  1. Limited examination due to poor contrast bolus timing.  No large vessel occlusion.  2. Extensive osseous metastatic disease.  There is a metastatic lesion within the C4 right lateral elements with an associated pathologic fracture of the pedicle.  No evidence of vertebral artery dissection.  3. Moderate or to large right and small left pleural effusions.  4. Partially imaged mediastinal lymphadenopathy as above.    Patient did NOT receive tPA - not candidate  ASA stopped given plt count of 26  Continue Atorvastatin    Vascular neurology consulted

## 2022-11-18 VITALS
SYSTOLIC BLOOD PRESSURE: 134 MMHG | OXYGEN SATURATION: 90 % | HEIGHT: 74 IN | TEMPERATURE: 98 F | DIASTOLIC BLOOD PRESSURE: 64 MMHG | BODY MASS INDEX: 20.93 KG/M2 | WEIGHT: 163.13 LBS | RESPIRATION RATE: 18 BRPM | HEART RATE: 91 BPM

## 2022-11-18 PROBLEM — I82.409 DVT (DEEP VENOUS THROMBOSIS): Status: RESOLVED | Noted: 2022-10-31 | Resolved: 2022-11-18

## 2022-11-18 LAB
ANION GAP SERPL CALC-SCNC: 10 MMOL/L (ref 8–16)
BASOPHILS # BLD AUTO: 0.03 K/UL (ref 0–0.2)
BASOPHILS NFR BLD: 0.2 % (ref 0–1.9)
BUN SERPL-MCNC: 17 MG/DL (ref 8–23)
CALCIUM SERPL-MCNC: 8.5 MG/DL (ref 8.7–10.5)
CHLORIDE SERPL-SCNC: 107 MMOL/L (ref 95–110)
CO2 SERPL-SCNC: 22 MMOL/L (ref 23–29)
CREAT SERPL-MCNC: 0.7 MG/DL (ref 0.5–1.4)
DIFFERENTIAL METHOD: ABNORMAL
EOSINOPHIL # BLD AUTO: 0.1 K/UL (ref 0–0.5)
EOSINOPHIL NFR BLD: 0.6 % (ref 0–8)
ERYTHROCYTE [DISTWIDTH] IN BLOOD BY AUTOMATED COUNT: 18.7 % (ref 11.5–14.5)
EST. GFR  (NO RACE VARIABLE): >60 ML/MIN/1.73 M^2
GLUCOSE SERPL-MCNC: 145 MG/DL (ref 70–110)
HCT VFR BLD AUTO: 21.5 % (ref 40–54)
HGB BLD-MCNC: 6.9 G/DL (ref 14–18)
IMM GRANULOCYTES # BLD AUTO: 1.07 K/UL (ref 0–0.04)
IMM GRANULOCYTES NFR BLD AUTO: 6.9 % (ref 0–0.5)
LYMPHOCYTES # BLD AUTO: 1.5 K/UL (ref 1–4.8)
LYMPHOCYTES NFR BLD: 9.6 % (ref 18–48)
MAGNESIUM SERPL-MCNC: 2 MG/DL (ref 1.6–2.6)
MCH RBC QN AUTO: 32.9 PG (ref 27–31)
MCHC RBC AUTO-ENTMCNC: 32.1 G/DL (ref 32–36)
MCV RBC AUTO: 102 FL (ref 82–98)
MONOCYTES # BLD AUTO: 0.5 K/UL (ref 0.3–1)
MONOCYTES NFR BLD: 3.5 % (ref 4–15)
NEUTROPHILS # BLD AUTO: 12.2 K/UL (ref 1.8–7.7)
NEUTROPHILS NFR BLD: 79.2 % (ref 38–73)
NRBC BLD-RTO: 0 /100 WBC
OB PNL STL: NEGATIVE
PLATELET # BLD AUTO: 26 K/UL (ref 150–450)
PMV BLD AUTO: 11 FL (ref 9.2–12.9)
POCT GLUCOSE: 130 MG/DL (ref 70–110)
POCT GLUCOSE: 160 MG/DL (ref 70–110)
POTASSIUM SERPL-SCNC: 3.7 MMOL/L (ref 3.5–5.1)
RBC # BLD AUTO: 2.1 M/UL (ref 4.6–6.2)
SODIUM SERPL-SCNC: 139 MMOL/L (ref 136–145)
WBC # BLD AUTO: 15.44 K/UL (ref 3.9–12.7)

## 2022-11-18 PROCEDURE — 82272 OCCULT BLD FECES 1-3 TESTS: CPT | Performed by: NURSE PRACTITIONER

## 2022-11-18 PROCEDURE — 99900035 HC TECH TIME PER 15 MIN (STAT)

## 2022-11-18 PROCEDURE — 85007 BL SMEAR W/DIFF WBC COUNT: CPT | Performed by: NURSE PRACTITIONER

## 2022-11-18 PROCEDURE — 83735 ASSAY OF MAGNESIUM: CPT | Performed by: NURSE PRACTITIONER

## 2022-11-18 PROCEDURE — 63600175 PHARM REV CODE 636 W HCPCS: Performed by: NURSE PRACTITIONER

## 2022-11-18 PROCEDURE — 25000003 PHARM REV CODE 250: Performed by: NURSE PRACTITIONER

## 2022-11-18 PROCEDURE — 36415 COLL VENOUS BLD VENIPUNCTURE: CPT | Performed by: NURSE PRACTITIONER

## 2022-11-18 PROCEDURE — 27000221 HC OXYGEN, UP TO 24 HOURS

## 2022-11-18 PROCEDURE — 97530 THERAPEUTIC ACTIVITIES: CPT

## 2022-11-18 PROCEDURE — 80048 BASIC METABOLIC PNL TOTAL CA: CPT | Performed by: NURSE PRACTITIONER

## 2022-11-18 PROCEDURE — 94761 N-INVAS EAR/PLS OXIMETRY MLT: CPT

## 2022-11-18 PROCEDURE — 63700000 PHARM REV CODE 250 ALT 637 W/O HCPCS: Performed by: INTERNAL MEDICINE

## 2022-11-18 PROCEDURE — 85027 COMPLETE CBC AUTOMATED: CPT | Performed by: NURSE PRACTITIONER

## 2022-11-18 RX ORDER — AZITHROMYCIN 250 MG/1
500 TABLET, FILM COATED ORAL DAILY
Status: DISCONTINUED | OUTPATIENT
Start: 2022-11-18 | End: 2022-11-18 | Stop reason: HOSPADM

## 2022-11-18 RX ORDER — AMOXICILLIN AND CLAVULANATE POTASSIUM 875; 125 MG/1; MG/1
1 TABLET, FILM COATED ORAL 2 TIMES DAILY
Qty: 8 TABLET | Refills: 0 | Status: SHIPPED | OUTPATIENT
Start: 2022-11-18 | End: 2022-11-22

## 2022-11-18 RX ORDER — AZITHROMYCIN 500 MG/1
500 TABLET, FILM COATED ORAL DAILY
Qty: 4 TABLET | Refills: 0 | Status: SHIPPED | OUTPATIENT
Start: 2022-11-19 | End: 2022-11-23

## 2022-11-18 RX ADMIN — ATORVASTATIN CALCIUM 80 MG: 40 TABLET, FILM COATED ORAL at 08:11

## 2022-11-18 RX ADMIN — CEFTRIAXONE 1 G: 1 INJECTION, SOLUTION INTRAVENOUS at 01:11

## 2022-11-18 RX ADMIN — AZITHROMYCIN MONOHYDRATE 500 MG: 250 TABLET ORAL at 11:11

## 2022-11-18 RX ADMIN — LOSARTAN POTASSIUM 100 MG: 50 TABLET, FILM COATED ORAL at 08:11

## 2022-11-18 NOTE — ASSESSMENT & PLAN NOTE
Outpatient management by Hem/Onc - patient is not a candidate for chemotherapy/systemic therapy   Analgesics PRN

## 2022-11-18 NOTE — DISCHARGE SUMMARY
[facility] Medicine  Discharge Summary      Patient Name: Joi Castellanos  MRN: [id number]  RAMIRO: [id number]  Patient Class: IP- Inpatient  Admission Date: 11/15/2022  Hospital Length of Stay: 2 days  Discharge Date and Time:  11/18/2022 11:37 AM  Attending Physician: Adrian Ramos MD   Discharging Provider: Katie Patel NP  Primary Care Provider: Marc Lakhani MD    Primary Care Team: Networked reference to record PCT     HPI:   77-year-old male presented to the emergency department for evaluation of left hip pain and hypoxemia.  Patient with metastatic lung cancer w/ bone metastasis. Had recent right arm fracture, for which he is scheduled to have surgery in the near future. States he felt lightheaded earlier today and had a low pulse ox reading.  This was self-limiting.  Denies any shortness of breath during that time.  Does note persistent left hip pain for the last week or so.  Denies any recent fall or trauma to the hip.  Denies any focal numbness or weakness, loss of continence, difficulty urinating or defecating.  Does note some diarrhea recently after taking Miralax following a period of constipation.  Denies any abdominal pain, vomiting, fever, or blood in stool.  No other symptoms reported at this time.      * No surgery found *      Hospital Course:   11/16: On 11/15 patient had AMS and aphasia in the ED. He went for head CT, MRI, CTA, and echo. It was revealed that patient had an ischemic stroke. Vascular stroke team consulted for evaluation. This morning patient still experiencing AMS and unable to answer orientation questions.     11/17: Patient confused and yelling for his wife during rounds this morning. He was able to speak to me and his speech was clear but he is still confused and requires a lot of redirection for simple tasks.  When asked for name and birthday he repeatedly told me his birthday but could not tell me his name.     11/18: Patient's family spoke with palliative  care yesterday. It was decided that patient should be a DNR and discharge home with hospice. Patient discharging today with hospice orders and DME. Unable to perform ROS 2/2 aphasia and mental status change. Patient unable to answer any orientation questions today but did say he is thirsty and denies pain. He is able to cooperate with motor portion of neuro exam. Weakness, aphasia, and disorientation present.  PERRL and EOM NL. BLE cool with PT pulses 2+. Heart sounds NL, rate and rhythm regular, breath sounds diminished on 3 L, abdominal sounds present. Remainder of ROS is otherwise negative.       Goals of Care Treatment Preferences:  Code Status: DNR    Health care agent: Cally Castellanos  [facility] agent number: [phone]    Living Will: Yes     What is most important right now is to focus on spending time at home, avoiding the hospital, remaining as independent as possible, symptom/pain control, quality of life, even if it means sacrificing a little time.  Accordingly, we have decided that the best plan to meet the patient's goals includes enrolling in hospice care.      Consults:   Consults (From admission, onward)          Status Ordering Provider     Inpatient consult to Hematology/Oncology  Once        Provider:  Rj Wilson MD    Completed JENNIFER PERRY     Inpatient consult to Palliative Care  Once        Provider:  Jerri Zamora NP    Completed JENINFER PERRY     Inpatient Consult Tele-Vascular Neurology  Once        Provider:  (Not yet assigned)    Completed ARLENE SALES     Consult to Telemedicine - Acute Stroke  Once        Provider:  (Not yet assigned)    Completed DARIO BABIN     Inpatient consult to Pulmonology  Once        Provider:  Erin Liu MD    Completed JENNIFER PERRY            * 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 Multiple acute infarctions in the left frontal lobe and insular cortex, the bilateral occipital  lobes, the right parietal lobe and the right cerebellar hemisphere.  Findings can be seen with embolic disease.  2. Small focus of susceptibility adjacent to the left insular cortex, highly concerning for a clot in an M3 branch.  3. Redemonstration of several small enhancing lesions concerning for early metastatic disease.    TTE 11/15:  The left ventricle is normal in size with normal systolic function.  The estimated ejection fraction is 55%.  Mild aortic regurgitation.  Mild tricuspid regurgitation.  The estimated PA systolic pressure is 53 mmHg.  Normal right ventricular size with normal right ventricular systolic function.  There is moderate pulmonary hypertension.  There is no evidence of intracardiac shunting.    CTA 11/15:  1. Limited examination due to poor contrast bolus timing.  No large vessel occlusion.  2. Extensive osseous metastatic disease.  There is a metastatic lesion within the C4 right lateral elements with an associated pathologic fracture of the pedicle.  No evidence of vertebral artery dissection.  3. Moderate or to large right and small left pleural effusions.  4. Partially imaged mediastinal lymphadenopathy as above.    Patient did NOT receive tPA - not candidate  ASA stopped given plt count of 26  Continue Atorvastatin    Vascular neurology consulted                                                                                                                       Thrombocytopenia  Likely 2/2 disease process  Plt 26  ASA o/h  Hem/onc consulted  Monitor for s/s of bleeding  Occult stool pending    Elevated troponin  Peak 0.717  EKG 11/15:  Normal sinus rhythm  Normal ECG  When compared with ECG of 31-OCT-2022 15:48,  No significant change was found      Adenocarcinoma of lung, stage 4  Following hem/onc outpatient - no longer candidate for chemo or systemic therapy  Worsening pleural effusion seen on CXR - pulm consulted but no thoracentesis at this time  O2 for comfort      Malignant  pleural effusion  Present on CXR  2/2 adenocarcinoma of right lung  Pulmonology consulted - no thoracentesis ordered at this time  O2 for comfort      Leukocytosis  CXR:    -Worsening pulmonary edema pneumonia aspiration or sepsis  WBC 15.44  Patient afebrile  Sputum cx pending  Received 1x dose azithromycin and rocephin in ED  Continue CAP coverage to complete 5 days      Encephalopathy  Developed after beginning blood - patient confirmed to have suffered from ischemic stroke  -Head CT:    -No acute large vascular territory infarct or intracranial hemorrhage identified.  If persistent neurologic deficit, MRI brain can be obtained.    -Involutional change and suspected sequela minimal chronic microvascular ischemic change.  -MRI brain:  1. Multiple acute infarctions in the left frontal lobe and insular cortex, the bilateral occipital lobes, the right parietal lobe and the right cerebellar hemisphere.  Findings can be seen with embolic disease.  2. Small focus of susceptibility adjacent to the left insular cortex, highly concerning for a clot in an M3 branch.  3. Redemonstration of several small enhancing lesions concerning for early metastatic disease.  -CTA:  1. Limited examination due to poor contrast bolus timing.  No large vessel occlusion.  2. Extensive osseous metastatic disease.  There is a metastatic lesion within the C4 right lateral elements with an associated pathologic fracture of the pedicle.  No evidence of vertebral artery dissection.  3. Moderate or to large right and small left pleural effusions.  4. Partially imaged mediastinal lymphadenopathy as above.    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  O2 for comfort      Hilar mass  Outpatient management by Hem/Onc - patient is not a candidate for chemotherapy/systemic therapy   Analgesics PRN  O2 for comfort      Bone metastases  Outpatient management by Hem/Onc - patient is not a candidate for chemotherapy/systemic therapy   Analgesics PRN        Anxiety  Home PRN hydroxyzine      Palliative care encounter  Palliative care consulted d/t patient's stage 4 adenocarcinoma of lung, bone metastasis, and brain mets  Patient code status: DNR  Discharge home w/ hospice  Appreciate palliative care recommendations.      Anemia of chronic disease  H/H 6.1/19.6 -> 7.3/22.4 s/p 1u PRBCs -> 7.1/22.0 -> 6.9/21.5  Patient discharging home w/ hospice - no transfusion warranted at this time    Fracture of right humerus  Analgesics PRN      Diabetes mellitus, type II  Patient's FSGs are controlled on current medication regimen.  Last A1c reviewed-   Lab Results   Component Value Date    HGBA1C 5.7 (H) 11/15/2022     Most recent fingerstick glucose reviewed-   Recent Labs   Lab 11/17/22  1122 11/17/22  1659 11/17/22  1929 11/18/22  0433   POCTGLUCOSE 176* 196* 162* 160*     Current correctional scale  Low  Maintain anti-hyperglycemic dose as follows-   Antihyperglycemics (From admission, onward)      Start     Stop Route Frequency Ordered    11/15/22 1812  insulin aspart U-100 pen 0-5 Units         -- SubQ Before meals & nightly PRN 11/15/22 1713          Hold Oral hypoglycemics while patient is in the hospital.    Hypertension  BP elevated  Discharge on home BP meds        Hyperlipidemia  Continue atorvastatin    Final Active Diagnoses:    Diagnosis Date Noted POA    PRINCIPAL PROBLEM:  Acute ischemic multifocal multiple vascular territories stroke [I63.89] 11/15/2022 Yes    Thrombocytopenia [D69.6] 11/17/2022 Yes    Elevated troponin [R77.8] 11/16/2022 Yes    Encephalopathy [G93.40] 11/15/2022 Unknown     Leukocytosis [D72.829] 11/15/2022 Yes    Malignant pleural effusion [J91.0] 11/15/2022 Yes    Adenocarcinoma of lung, stage 4 [C34.90] 11/15/2022 Yes    Non-small cell cancer of right lung [C34.91] 11/14/2022 Yes    Bone metastases [C79.51] 11/02/2022 Yes    Pleural effusion [J90] 11/02/2022 Unknown    Hilar mass [R91.8] 11/02/2022 Yes    Anxiety [F41.9] 11/02/2022 Yes    Palliative care encounter [Z51.5] 11/01/2022 Not Applicable    Fracture of right humerus [S42.301A] 10/31/2022 Yes    Anemia of chronic disease [D63.8] 10/31/2022 Yes    Diabetes mellitus, type II [E11.9]  Yes    Hypertension [I10] 03/22/2017 Yes    Hyperlipidemia [E78.5] 08/19/2015 Yes      Problems Resolved During this Admission:    Diagnosis Date Noted Date Resolved POA    DVT (deep venous thrombosis) [I82.409] 10/31/2022 11/18/2022 Yes       Discharged Condition: poor    Disposition: Hospice/Medical Facility    Follow Up:    Patient Instructions:      Ambulatory referral/consult to Vascular Neurology   Standing Status: Future   Referral Priority: Routine Referral Type: Consultation   Referral Reason: Specialty Services Required   Requested Specialty: Vascular Neurology   Number of Visits Requested: 1     Diet Adult Regular       Significant Diagnostic Studies: Labs:   BMP:   Recent Labs   Lab 11/17/22  0415 11/18/22  0505   * 145*   * 139   K 3.7 3.7   * 107   CO2 18* 22*   BUN 18 17   CREATININE 0.6 0.7   CALCIUM 8.5* 8.5*   MG 1.9 2.0   , CBC   Recent Labs   Lab 11/17/22  0415 11/18/22  0505   WBC 16.21* 15.44*   HGB 7.1* 6.9*   HCT 22.0* 21.5*   PLT 29* 26*   , Troponin   Recent Labs   Lab 11/16/22  0821 11/16/22  1021 11/16/22  1615   TROPONINI 0.717* 0.590* 0.555*    and A1C:   Recent Labs   Lab 06/08/22  0741 09/08/22  0757 11/15/22  1611   HGBA1C 6.7* 6.1* 5.7*       Pending Diagnostic Studies:       None           Medications:  Reconciled Home Medications:      Medication List        START taking these medications       amoxicillin-clavulanate 875-125mg 875-125 mg per tablet  Commonly known as: AUGMENTIN  Take 1 tablet by mouth 2 (two) times daily. for 4 days     azithromycin 500 MG tablet  Commonly known as: ZITHROMAX  Take 1 tablet (500 mg total) by mouth once daily. for 4 days  Start taking on: November 19, 2022            CHANGE how you take these medications      meloxicam 7.5 MG tablet  Commonly known as: MOBIC  Take 1 tablet (7.5 mg total) by mouth once daily. TAKE WITH FOOD.  What changed: additional instructions     oxyCODONE 10 mg Tab immediate release tablet  Commonly known as: ROXICODONE  Take 1 tablet (10 mg total) by mouth every 6 (six) hours as needed for Pain (chronic cancer-related pain).  What changed: additional instructions            CONTINUE taking these medications      acetaminophen 500 MG tablet  Commonly known as: TYLENOL  Take 2 tablets (1,000 mg total) by mouth every 6 (six) hours as needed for Pain.     folic acid 1 MG tablet  Commonly known as: FOLVITE  Take 1 tablet (1 mg total) by mouth once daily.     HYDROcodone-acetaminophen 5-325 mg per tablet  Commonly known as: NORCO  Take 1 tablet by mouth every 6 (six) hours as needed for Pain.     hydrOXYzine pamoate 25 MG Cap  Commonly known as: VISTARIL  Take 1 capsule (25 mg total) by mouth every 8 (eight) hours as needed (anxiety).     loperamide 2 mg capsule  Commonly known as: IMODIUM  Take 2 mg by mouth 4 (four) times daily as needed for Diarrhea.     losartan 100 MG tablet  Commonly known as: COZAAR  TAKE 1 TABLET BY MOUTH EVERY DAY     rosuvastatin 20 MG tablet  Commonly known as: CRESTOR  TAKE 1 TABLET BY MOUTH EVERY DAY     vitamin D 1000 units Tab  Commonly known as: VITAMIN D3  Take 1,000 Units by mouth once daily.     zinc gluconate 50 mg tablet  Take 50 mg by mouth once daily. PRN            STOP taking these medications      ELIQUIS 5 mg Tab  Generic drug: apixaban     enoxaparin 60 mg/0.6 mL Syrg  Commonly known as: LOVENOX               Indwelling Lines/Drains at time of discharge:   Lines/Drains/Airways       None                   Time spent on the discharge of patient: 60 minutes         Katie Patel NP  Department of [facility] Medicine  [facility]

## 2022-11-18 NOTE — ASSESSMENT & PLAN NOTE
Outpatient management by Hem/Onc - patient is not a candidate for chemotherapy/systemic therapy   Analgesics PRN  O2 for comfort

## 2022-11-18 NOTE — ASSESSMENT & PLAN NOTE
Outpatient management by Hem/Onc - patient is not a candidate for chemotherapy/systemic therapy   O2 for comfort  Analgesics PRN

## 2022-11-18 NOTE — ASSESSMENT & PLAN NOTE
Present on CXR  2/2 adenocarcinoma of right lung  Pulmonology consulted - no thoracentesis ordered at this time  O2 for comfort

## 2022-11-18 NOTE — PLAN OF CARE
VN note: Care plan, orders and labs reviewed.  VN note: VN completed AVS and attachments and notified bedside nurseLydia. Will cont to be available and intervene prn.

## 2022-11-18 NOTE — ASSESSMENT & PLAN NOTE
H/H 6.1/19.6 -> 7.3/22.4 s/p 1u PRBCs -> 7.1/22.0 -> 6.9/21.5  Patient discharging home w/ hospice - no transfusion warranted at this time

## 2022-11-18 NOTE — ASSESSMENT & PLAN NOTE
Following hem/onc outpatient - no longer candidate for chemo or systemic therapy  Worsening pleural effusion seen on CXR - pulm consulted but no thoracentesis at this time  O2 for comfort

## 2022-11-18 NOTE — PLAN OF CARE
SW spoke with pt's wife Cally [phone] to discuss final d/c. Pt will be transported home via ambulance sw set transport for 2, [facility] confirmed transport within the hour. Pt will receive hospice [facility] services. Paperwork completed and Catrina confirmed that DME is delivered. Rounds completed on pt.  All questions addressed.  Bedside nurse to discuss d/c medications.  Discussed importance to attend all f/u appts and take medications as prescribed.  Verbalized understanding.    PEPE Cartwright  [phone]    Future Appointments   Date Time Provider Department Center   12/1/2022  3:00 PM Savannah Ricketts MD [facility]        11/18/22 1422   Final Note   Assessment Type Final Discharge Note   Anticipated Discharge Disposition HospiceHome   What phone number can be called within the next 1-3 days to see how you are doing after discharge? [phone]   Hospital Resources/Appts/Education Provided Appointments scheduled and added to AVS   Post-Acute Status   Post-Acute Authorization Hospice   Hospice Status Set-up Complete/Auth obtained   Coverage [employer]   Discharge Delays (!) [facility] Arranged Transportation

## 2022-11-18 NOTE — ASSESSMENT & PLAN NOTE
Developed after beginning blood - patient confirmed to have suffered from ischemic stroke  -Head CT:    -No acute large vascular territory infarct or intracranial hemorrhage identified.  If persistent neurologic deficit, MRI brain can be obtained.    -Involutional change and suspected sequela minimal chronic microvascular ischemic change.  -MRI brain:  1. Multiple acute infarctions in the left frontal lobe and insular cortex, the bilateral occipital lobes, the right parietal lobe and the right cerebellar hemisphere.  Findings can be seen with embolic disease.  2. Small focus of susceptibility adjacent to the left insular cortex, highly concerning for a clot in an M3 branch.  3. Redemonstration of several small enhancing lesions concerning for early metastatic disease.  -CTA:  1. Limited examination due to poor contrast bolus timing.  No large vessel occlusion.  2. Extensive osseous metastatic disease.  There is a metastatic lesion within the C4 right lateral elements with an associated pathologic fracture of the pedicle.  No evidence of vertebral artery dissection.  3. Moderate or to large right and small left pleural effusions.  4. Partially imaged mediastinal lymphadenopathy as above.    S/p NV restraints      See Acute ischemic multifocal multiple vascular territories stroke

## 2022-11-18 NOTE — ASSESSMENT & PLAN NOTE
Likely 2/2 disease process  Plt 26  ASA o/h  Hem/onc consulted  Monitor for s/s of bleeding  Occult stool pending

## 2022-11-18 NOTE — ASSESSMENT & PLAN NOTE
Palliative care consulted d/t patient's stage 4 adenocarcinoma of lung, bone metastasis, and brain mets  Patient code status: DNR  Discharge home w/ hospice  Appreciate palliative care recommendations.

## 2022-11-18 NOTE — PLAN OF CARE
Patient with recent GOC discussions with palliative medicine, hematology, hospital medicine, and vascular neurology team. Agree with palliative care regarding decision on home hospice as patient is poor candidate for chemotherapy/systemic therapy. Will also hold off on invasive pulmonary measures at this time.    Bonilla Mulligan MD PGY-2  [facility]  [facility]

## 2022-11-18 NOTE — PT/OT/SLP PROGRESS
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  He presents with ..The primary encounter diagnosis was Anemia of chronic disease. 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  . Performance deficits affecting function are weakness, visual deficits, impaired endurance, impaired cognition, impaired sensation, decreased coordination, impaired self care skills, impaired functional mobility, gait instability, impaired balance, pain, decreased safety awareness, impaired skin, decreased upper extremity function, decreased lower extremity function, impaired coordination, decreased ROM, impaired cardiopulmonary response to activity.     Oriented to self only. Tolerated being respositioned. D/c OT , patient is to d/c home with hospice.       Plan:     Patient to be seen  (d/c OT) to address the above listed problems via therapeutic exercises, therapeutic activities, self-care/home management, neuromuscular re-education  Plan of Care Reviewed with: patient    Subjective     Pain/Comfort:  Pain Rating 1: other (see comments) (presents to have discomfort (positional))  Pain Rating Post-Intervention 1: other (see comments) (no increased pain)    Objective:     Communicated with: nursing prior to session.  Patient found HOB elevated with bed alarm, telemetry, peripheral IV upon OT  entry to room.    General Precautions: Standard, fall   Orthopedic Precautions:RUE non weight bearing   Braces: UE brace  Respiratory Status: Nasal cannula     Occupational Performance:     Bed Mobility:    Patient completed Rolling/Turning to Left with  total assistance       [facility] 6 Click ADL: 10    Treatment & Education:  Patient oriented to self. Min grimacing noted. Total assist to roll left followed by placement of pillow under right shoulder to support neutral posture. Min verbalizations and nodding noted.    Patient left HOB elevated with all lines intact, call button in reach, bed alarm on, and nursing  notified    *outside of therapy time OT called spouse from 11:25-11:30am for an update regarding positioning recommendations to support transition home with hospice. Educated regarding pillow placement. Provided with waffle overlay to bring to home.  GOALS:   Multidisciplinary Problems       Occupational Therapy Goals       Not on file              Multidisciplinary Problems (Resolved)          Problem: Occupational Therapy    Goal Priority Disciplines Outcome Interventions   Occupational Therapy Goal   (Resolved)     OT, PT/OT Met    Description: Goals to be met by: 12/1/2022     Patient will increase functional independence with ADLs by performing:    Self feeding with adaptive technique as needed to support PO intake  Family reciprocation of positioning / turning techniques and additional equipment as needed to increase comfort and ease without increased R shoulder pain                         Time Tracking:     OT Date of Treatment: 11/18/22  OT Start Time: 1110  OT Stop Time: 1120  OT Total Time (min): 10 min    Billable Minutes:Therapeutic Activity 10 min    OT/STEPHANIE: OT          11/18/2022

## 2022-11-18 NOTE — ASSESSMENT & PLAN NOTE
Likely r/t non-small cell cancer of R lung  Present on CXR 11/15:  -Worsening pulmonary edema pneumonia aspiration or sepsis.  O2 for comfort

## 2022-11-18 NOTE — ASSESSMENT & PLAN NOTE
Patient's FSGs are controlled on current medication regimen.  Last A1c reviewed-   Lab Results   Component Value Date    HGBA1C 5.7 (H) 11/15/2022     Most recent fingerstick glucose reviewed-   Recent Labs   Lab 11/17/22  1122 11/17/22  1659 11/17/22  1929 11/18/22  0433   POCTGLUCOSE 176* 196* 162* 160*     Current correctional scale  Low  Maintain anti-hyperglycemic dose as follows-   Antihyperglycemics (From admission, onward)    Start     Stop Route Frequency Ordered    11/15/22 1812  insulin aspart U-100 pen 0-5 Units         -- SubQ Before meals & nightly PRN 11/15/22 1713        Hold Oral hypoglycemics while patient is in the hospital.

## 2022-11-18 NOTE — ASSESSMENT & PLAN NOTE
CXR:    -Worsening pulmonary edema pneumonia aspiration or sepsis  WBC 15.44  Patient afebrile  Sputum cx pending  Received 1x dose azithromycin and rocephin in ED  Continue CAP coverage to complete 5 days

## 2022-11-18 NOTE — PLAN OF CARE
[facility]  Department of Hospital Medicine  [address] [place] [address]  [phone] after hours  [phone] fax    HOSPICE  ORDERS    11/18/2022    Admit to Hospice:  Home Service   Diagnoses:   Active Hospital Problems    Diagnosis  POA    *Acute ischemic multifocal multiple vascular territories stroke [I63.89]  Yes    Thrombocytopenia [D69.6]  Yes    Elevated troponin [R77.8]  Yes    Encephalopathy [G93.40]  Unknown    Leukocytosis [D72.829]  Yes     WBC 18.43  Patient afebrile  Sputum cx pending  Received 1x dose azithromycin and rocephin in ED  Continue empiric abx      Malignant pleural effusion [J91.0]  Yes    Adenocarcinoma of lung, stage 4 [C34.90]  Yes    Non-small cell cancer of right lung [C34.91]  Yes    Bone metastases [C79.51]  Yes    Pleural effusion [J90]  Unknown    Hilar mass [R91.8]  Yes    Anxiety [F41.9]  Yes    Palliative care encounter [Z51.5]  Not Applicable    Fracture of right humerus [S42.301A]  Yes    Anemia of chronic disease [D63.8]  Yes    Diabetes mellitus, type II [E11.9]  Yes    Hypertension [I10]  Yes    Hyperlipidemia [E78.5]  Yes      Resolved Hospital Problems    Diagnosis Date Resolved POA    DVT (deep venous thrombosis) [I82.409] 11/18/2022 Yes       Hospice Qualifying Diagnoses: Adenocarcinoma of lung, stage 4       Patient has a life expectancy < 6 months due to:  Primary Hospice Diagnosis:  Adenocarcinoma of lung, stage 4  Comorbid Conditions Contributing to Decline:  Acute ischemic multifocal multiple vascular territories stroke    Vital Signs: Routine per Hospice Protocol.    Code Status: DNR    Allergies:   Review of patient's allergies indicates:   Allergen Reactions    Asa [aspirin]      Patient states he doesn't have allergy to medication - was instructed by provider not to take d/t bleeding risk b/c he was taking a high dose previously       Diet: Regular     Activities: As tolerated    Goals of Care Treatment  Preferences:  Code Status: DNR    Health care agent: Cally Castellanos  Health care agent number: [phone]    Living Will: Yes     What is most important right now is to focus on spending time at home, avoiding the hospital, remaining as independent as possible, symptom/pain control, quality of life, even if it means sacrificing a little time.  Accordingly, we have decided that the best plan to meet the patient's goals includes enrolling in hospice care.      Nursing: Per Hospice Routine.      Routine Skin for Bedridden Patients: Apply moisture barrier cream to all skin folds and   wet areas in perineal area daily and after baths and all bowel movements.    Oxygen: 3L NC    Other Miscellaneous Care:         Medications:        Medication List        START taking these medications      amoxicillin-clavulanate 875-125mg 875-125 mg per tablet  Commonly known as: AUGMENTIN  Take 1 tablet by mouth 2 (two) times daily. for 4 days     azithromycin 500 MG tablet  Commonly known as: ZITHROMAX  Take 1 tablet (500 mg total) by mouth once daily. for 4 days  Start taking on: November 19, 2022            CHANGE how you take these medications      meloxicam 7.5 MG tablet  Commonly known as: MOBIC  Take 1 tablet (7.5 mg total) by mouth once daily. TAKE WITH FOOD.  What changed: additional instructions     oxyCODONE 10 mg Tab immediate release tablet  Commonly known as: ROXICODONE  Take 1 tablet (10 mg total) by mouth every 6 (six) hours as needed for Pain (chronic cancer-related pain).  What changed: additional instructions            CONTINUE taking these medications      acetaminophen 500 MG tablet  Commonly known as: TYLENOL  Take 2 tablets (1,000 mg total) by mouth every 6 (six) hours as needed for Pain.     folic acid 1 MG tablet  Commonly known as: FOLVITE  Take 1 tablet (1 mg total) by mouth once daily.     HYDROcodone-acetaminophen 5-325 mg per tablet  Commonly known as: NORCO  Take 1 tablet by mouth every 6 (six) hours as  needed for Pain.     hydrOXYzine pamoate 25 MG Cap  Commonly known as: VISTARIL  Take 1 capsule (25 mg total) by mouth every 8 (eight) hours as needed (anxiety).     loperamide 2 mg capsule  Commonly known as: IMODIUM  Take 2 mg by mouth 4 (four) times daily as needed for Diarrhea.     losartan 100 MG tablet  Commonly known as: COZAAR  TAKE 1 TABLET BY MOUTH EVERY DAY     rosuvastatin 20 MG tablet  Commonly known as: CRESTOR  TAKE 1 TABLET BY MOUTH EVERY DAY     vitamin D 1000 units Tab  Commonly known as: VITAMIN D3  Take 1,000 Units by mouth once daily.     zinc gluconate 50 mg tablet  Take 50 mg by mouth once daily. PRN            STOP taking these medications      ELIQUIS 5 mg Tab  Generic drug: apixaban     enoxaparin 60 mg/0.6 mL Syrg  Commonly known as: LOVENOX                DIABETES CARE:  Nurse to perform and educate diabetic management with blood glucose monitoring:           Fingerstick blood sugar a.m. and p.m.     Fingerstick blood sugar every 6 hours if patient is unable to eat    Report CBG < 60 or > 350 to physician.         Insulin Sliding Scale         Glucose  Novolog Insulin Subcutaneous        0 - 60   Orange juice or glucose tablet      No insulin   201-250  2 units   251-300  4 units   301-350  6 units   351-400  8 units   >400   10 units then call physician      Future Orders:  Hospice Medical Director may dictate new orders for comfortable care measures & sign death certificate.        _________________________________  Katie Patel NP  11/18/2022

## 2022-11-18 NOTE — PLAN OF CARE
Problem: Diabetes Comorbidity  Goal: Blood Glucose Level Within Targeted Range  Outcome: Ongoing, Progressing     Problem: Anemia  Goal: Anemia Symptom Improvement  Outcome: Ongoing, Progressing     Problem: Skin Injury Risk Increased  Goal: Skin Health and Integrity  Outcome: Ongoing, Progressing     Problem: Fall Injury Risk  Goal: Absence of Fall and Fall-Related Injury  Outcome: Ongoing, Progressing

## 2022-11-18 NOTE — PROGRESS NOTES
Pharmacist Intervention IV to PO Note    Joi Castellanos is a 77 y.o. male being treated with IV medication azithromycin    Patient Data:    Vital Signs (Most Recent):  Temp: 97.7 °F (36.5 °C) (11/18/22 0826)  Pulse: 101 (11/18/22 0826)  Resp: 18 (11/18/22 0826)  BP: (!) 168/70 (11/18/22 0826)  SpO2: (!) 92 % (11/18/22 0826)   Vital Signs (72h Range):  Temp:  [96 °F (35.6 °C)-99.1 °F (37.3 °C)]   Pulse:  []   Resp:  [16-31]   BP: (122-168)/(57-87)   SpO2:  [89 %-97 %]      CBC:  Recent Labs   Lab 11/16/22  0309 11/17/22  0415 11/18/22  0505   WBC 19.63* 16.21* 15.44*   RBC 2.22* 2.21* 2.10*   HGB 7.3* 7.1* 6.9*   HCT 22.4* 22.0* 21.5*   PLT 90* 29* 26*   * 100* 102*   MCH 32.9* 32.1* 32.9*   MCHC 32.6 32.3 32.1     CMP:     Recent Labs   Lab 11/15/22  1112 11/16/22  0309 11/17/22  0415 11/18/22  0505   * 135* 152* 145*   CALCIUM 8.7 8.7 8.5* 8.5*   ALBUMIN 2.5*  --   --   --    PROT 6.6  --   --   --    * 137 146* 139   K 3.9 3.6 3.7 3.7   CO2 20* 20* 18* 22*    106 113* 107   BUN 19 16 18 17   CREATININE 0.7 0.7 0.6 0.7   ALKPHOS 546*  --   --   --    ALT 20  --   --   --    AST 27  --   --   --    BILITOT 0.7  --   --   --        Dietary Orders:  Diet Orders            Dietary nutrition supplements [facility]; Boost Plus - Vanilla starting at 11/16 1800    Diet Adult Regular (IDDSI Level 7): Regular starting at 11/16 1115            Based on the following criteria, this patient qualifies for intravenous to oral conversion:  [x] The patients gastrointestinal tract is functioning (tolerating medications via oral or enteral route for 24 hours and tolerating food or enteral feeds for 24 hours).  [x] The patient is hemodynamically stable for 24 hours (heart rate <100 beats per minute, systolic blood pressure >99 mm Hg, and respiratory rate <20 breaths per minute).  [x] The patient shows clinical improvement (afebrile for at least 24 hours and white blood cell count downtrending  or normalized). Additionally, the patient must be non-neutropenic (absolute neutrophil count >500 cells/mm3).  [x] For antimicrobials, the patient has received IV therapy for at least 24 hours.    IV medication azithromycinwill be changed to oral medication     Pharmacist's Name: George Britton  Pharmacist's Extension: [phone]

## 2022-11-25 ENCOUNTER — TELEPHONE (OUTPATIENT)
Dept: HEMATOLOGY/ONCOLOGY | Facility: CLINIC | Age: 77
End: 2022-11-25

## 2022-11-25 NOTE — TELEPHONE ENCOUNTER
----- Message from Ada Gar sent at 11/25/2022  2:25 PM CST -----  Regarding: RE: Port placement  Call to schedule port placement when patient's spouse stated he pass away. I did not ask her when but inform her that I would send this message.  ----- Message -----  From: Felicitas Adkins MA  Sent: 11/14/2022  11:47 AM CST  To: Ada Gar  Subject: Port placement                                   Good Morning,        put an order in for a port placement.

## 2022-12-07 LAB — FUNGUS SPEC CULT: NORMAL

## 2022-12-21 LAB
ACID FAST MOD KINY STN SPEC: NORMAL
MYCOBACTERIUM SPEC QL CULT: NORMAL

## 2023-03-07 NOTE — HPI
"Per chart. "78 yo male with a PMH of DM II, HLD, Hip replacement 2019, noted pathologic R humerus fx 10/26/22 presents today with LLE edema for about a week. He notes the edema to be prior to the fx occurring. Prior to the noted fracture, he states he caught his weight on the R arm after slipping back slightly while on the toilet.. Since 10/26 he has had prolonged sitting. He notes about 25 pounds of unintentional weight loss over the past 6 months. He notes R lumbar aching pain, constant for the past few days. He denies HA, vision changes, dizziness, lightheadedness, CP, SOB, abdominal pain, n/v/d/f/c/c, numbness, tingling, recent travel, melena, hemoptysis, hematemesis.      Family hx mother: cancer with spinal tumors     He has a follow up appointment with [facility] planned for 11/4/22 for suspected possible malignancy.      "
76 yo male with a PMH of DM II, HLD, Hip replacement 2019, noted pathologic R humerus fx 10/26/22 presents today with LLE edema for about a week. He notes the edema to be prior to the fx occurring. Prior to the noted fracture, he states he caught his weight on the R arm after slipping back slightly while on the toilet.. Since 10/26 he has had prolonged sitting. He notes about 25 pounds of unintentional weight loss over the past 6 months. He notes R lumbar aching pain, constant for the past few days. He denies HA, vision changes, dizziness, lightheadedness, CP, SOB, abdominal pain, n/v/d/f/c/c, numbness, tingling, recent travel, melena, hemoptysis, hematemesis.     Family hx mother: cancer with spinal tumors    He has a follow up appointment with [facility] planned for 11/4/22 for suspected possible malignancy.   
77 year-old male with right humerus fracture, anemia was admitted on 10/31/22 for deep vein thrombosis. Concern is for possible pathologic fracture, malignancy.  
Yes

## 2023-05-21 NOTE — TELEPHONE ENCOUNTER
----- Message from Maria Elena Lucas sent at 8/21/2017 11:25 AM CDT -----  Got call from dr arik smith in [facility]  office stating that they dont accept his insurance      So now patient needs a referral to see dr cook at [facility]  Reach patient at   [phone]   Last BM 5/17/21.  Miralax given.

## 2024-05-28 NOTE — TELEPHONE ENCOUNTER
Faxed add on to lab and spoke to Gen   Patient : Steve Gonzalez Age: 53 year old Sex: male   MRN: [id number] Encounter Date: 5/28/2024      History     Chief Complaint   Patient presents with    Cough     HPI  This is a 53-year-old male presenting for evaluation of cough and congestion that has been ongoing for the last week.  Patient states he was seen at urgent care and was given prednisone, albuterol and azithromycin but did not have a chest x-ray done and has not had much symptom improvement.  Patient denies chest pain, shortness of breath/difficulty breathing, chills, fevers.  He states he was originally seen and treated for tonsillitis which has improved and is now experiencing cold symptoms.  Past medical history includes hypertension.    Allergies   Allergen Reactions    Peanut   (Food Or Med) SWELLING    Proanthocyanidin Angioedema and SWELLING    Shellfish-Derived Products   (Food Or Med) DIZZINESS, HEADACHES, HIVES, PRURITUS, RASH, SHORTNESS OF BREATH and SWELLING    Shrimp Extract Allergy Skin Test ANAPHYLAXIS    Grapes [Grape   (Food)] Angioedema       Discharge Medication List as of 5/28/2024  2:21 PM        New Prescriptions    Details   guaiFENesin-codeine (GUAIFENESIN AC) 100-10 MG/5ML liquid Take 5 mLs by mouth 3 times daily as needed for Cough.Eprescribe, Disp-60 mL, R-0      Dextromethorphan-guaiFENesin (Mucinex DM)  MG TABLET SR 12 HR Take 1 tablet by mouth 2 times daily as needed (congestion).Eprescribe, Disp-28 tablet, R-0             No past medical history on file.    No past surgical history on file.    No family history on file.         Review of Systems   Constitutional:  Negative for chills and fever.   HENT:  Positive for congestion. Negative for sore throat and trouble swallowing.    Respiratory:  Positive for cough. Negative for shortness of breath.    Cardiovascular:  Negative for chest pain.   Gastrointestinal:  Negative for abdominal pain, nausea and vomiting.   Musculoskeletal:  Negative for back pain.    Neurological:  Negative for dizziness, light-headedness and headaches.   Psychiatric/Behavioral:  Negative for agitation and behavioral problems.        Physical Exam     ED Triage Vitals [05/28/24 1326]   ED Triage Vitals Group      Temp 98.4 °F (36.9 °C)      Heart Rate 85      Resp 19      BP (!) 142/88      SpO2 97 %      EtCO2 mmHg       Height 5' 11\" (1.803 m)      Weight 235 lb (106.6 kg)      Weight Scale Used ED Stated      BMI (Calculated) 32.78      IBW/kg (Calculated) 75.3       Physical Exam  Vitals and nursing note reviewed.   Constitutional:       General: He is not in acute distress.     Appearance: He is not ill-appearing or toxic-appearing.   HENT:      Mouth/Throat:      Mouth: Mucous membranes are moist.      Pharynx: Oropharynx is clear.   Cardiovascular:      Rate and Rhythm: Normal rate.      Pulses: Normal pulses.   Pulmonary:      Effort: Pulmonary effort is normal. No respiratory distress.      Breath sounds: Normal breath sounds. No wheezing, rhonchi or rales.   Abdominal:      General: Abdomen is flat.      Palpations: Abdomen is soft.   Skin:     General: Skin is warm and dry.      Capillary Refill: Capillary refill takes less than 2 seconds.   Neurological:      Mental Status: He is alert and oriented to person, place, and time.   Psychiatric:         Mood and Affect: Mood normal.         Behavior: Behavior normal.         ED Course     Procedures    Lab Results     No results found for this visit on 05/28/24.    EKG Results       Radiology Results     Imaging Results              XR CHEST PA AND LATERAL 2 VIEWS (Final result)  Result time 05/28/24 14:06:59      Final result                   Impression:    Small curvilinear atelectasis or scarring in the left lung base. Otherwise  no apparent acute process.    Electronically Signed by: MICHELLE MUNGUIA   Signed on: 5/28/2024 2:06 PM   Workstation ID: [id number]               Narrative:    EXAM: XR CHEST PA AND LATERAL 2  VIEWS    CLINICAL INDICATION: Cough    COMPARISON: None.    FINDINGS:  The lungs are normally inflated.  Cardiac silhouette and pulmonary  vascularity are within normal limits. Small curvilinear density in the left  lung base may reflect atelectasis or scarring. There is no airspace  consolidation, pleural effusions, or pneumothoraces.  Visualized osseous  structures appear intact.                                      ED Medication Orders (From admission, onward)      None                 Medical Decision Making  This is a 53-year-old male presenting for evaluation of cough and congestion that has been ongoing for the last week.  Patient states he was seen at urgent care and was given prednisone, albuterol and azithromycin but did not have a chest x-ray done and has not had much symptom improvement.  Patient denies chest pain, shortness of breath/difficulty breathing, chills, fevers.  He states he was originally seen and treated for tonsillitis which has improved and is now experiencing cold symptoms.  Past medical history includes hypertension.    Chest x-ray imaging showed no acute cardiopulmonary process.  Lungs were clear to auscultation bilaterally and patient's vital signs were within normal limits.  Patient did complete a course of azithromycin within the last week and oropharyngeal exam was otherwise unremarkable and improved.  Patient will be given symptomatic management for cough and congestion and is to follow-up with PCP for further evaluation.  Discussed signs and symptoms of concern that would warrant prompt ED return.  Patient is otherwise hemodynamically stable for discharge home, was in no respiratory distress and oxygen saturation was normal on room air.  Patient understands return precautions and has no further questions at this time.    Clinical Impression     ED Diagnosis   1. Persistent cough  guaiFENesin-codeine (GUAIFENESIN AC) 100-10 MG/5ML liquid      2. Viral syndrome             Disposition        Discharge 5/28/2024  2:20 PM  Steve Gonzalez discharge to home/self care.           Akil Locke PA-C  05/28/24 [id number]